# Patient Record
Sex: FEMALE | Race: WHITE | NOT HISPANIC OR LATINO | Employment: UNEMPLOYED | URBAN - METROPOLITAN AREA
[De-identification: names, ages, dates, MRNs, and addresses within clinical notes are randomized per-mention and may not be internally consistent; named-entity substitution may affect disease eponyms.]

---

## 2021-01-27 ENCOUNTER — APPOINTMENT (OUTPATIENT)
Dept: GENERAL RADIOLOGY | Facility: HOSPITAL | Age: 36
End: 2021-01-27

## 2021-01-27 ENCOUNTER — HOSPITAL ENCOUNTER (EMERGENCY)
Facility: HOSPITAL | Age: 36
Discharge: HOME | End: 2021-01-27
Attending: EMERGENCY MEDICINE | Admitting: EMERGENCY MEDICINE

## 2021-01-27 VITALS
RESPIRATION RATE: 18 BRPM | BODY MASS INDEX: 36.37 KG/M2 | HEART RATE: 58 BPM | DIASTOLIC BLOOD PRESSURE: 48 MMHG | HEIGHT: 67 IN | WEIGHT: 231.7 LBS | SYSTOLIC BLOOD PRESSURE: 109 MMHG | TEMPERATURE: 97.9 F | OXYGEN SATURATION: 100 %

## 2021-01-27 DIAGNOSIS — S16.1XXA ACUTE STRAIN OF NECK MUSCLE, INITIAL ENCOUNTER: Primary | ICD-10-CM

## 2021-01-27 DIAGNOSIS — S20.219A CONTUSION OF CHEST WALL, UNSPECIFIED LATERALITY, INITIAL ENCOUNTER: ICD-10-CM

## 2021-01-27 PROCEDURE — 72050 X-RAY EXAM NECK SPINE 4/5VWS: CPT

## 2021-01-27 PROCEDURE — 99283 EMERGENCY DEPT VISIT LOW MDM: CPT

## 2021-01-27 PROCEDURE — 96374 THER/PROPH/DIAG INJ IV PUSH: CPT

## 2021-01-27 PROCEDURE — 99283 EMERGENCY DEPT VISIT LOW MDM: CPT | Performed by: EMERGENCY MEDICINE

## 2021-01-27 PROCEDURE — 25010000003 HEPARIN LOCK FLUSH PER 10 UNITS: Performed by: EMERGENCY MEDICINE

## 2021-01-27 PROCEDURE — 71046 X-RAY EXAM CHEST 2 VIEWS: CPT

## 2021-01-27 RX ORDER — PAROXETINE 30 MG/1
30 TABLET, FILM COATED ORAL EVERY MORNING
COMMUNITY

## 2021-01-27 RX ORDER — GABAPENTIN 400 MG/1
400 CAPSULE ORAL DAILY
COMMUNITY

## 2021-01-27 RX ORDER — LAMOTRIGINE 25 MG/1
50 TABLET ORAL DAILY
COMMUNITY

## 2021-01-27 RX ORDER — METAXALONE 800 MG/1
TABLET ORAL
Qty: 20 TABLET | Refills: 0 | Status: SHIPPED | OUTPATIENT
Start: 2021-01-27

## 2021-01-27 RX ORDER — HEPARIN SODIUM (PORCINE) LOCK FLUSH IV SOLN 100 UNIT/ML 100 UNIT/ML
500 SOLUTION INTRAVENOUS ONCE
Status: COMPLETED | OUTPATIENT
Start: 2021-01-27 | End: 2021-01-27

## 2021-01-27 RX ADMIN — HEPARIN 500 UNITS: 100 SYRINGE at 20:15

## 2022-08-04 ENCOUNTER — OFFICE VISIT (OUTPATIENT)
Dept: OBSTETRICS AND GYNECOLOGY | Facility: CLINIC | Age: 37
End: 2022-08-04

## 2022-08-04 VITALS
HEIGHT: 65 IN | WEIGHT: 263.2 LBS | SYSTOLIC BLOOD PRESSURE: 136 MMHG | DIASTOLIC BLOOD PRESSURE: 92 MMHG | BODY MASS INDEX: 43.85 KG/M2

## 2022-08-04 DIAGNOSIS — Z86.711 HISTORY OF PULMONARY EMBOLISM: ICD-10-CM

## 2022-08-04 DIAGNOSIS — Z11.51 ENCOUNTER FOR SCREENING FOR HUMAN PAPILLOMAVIRUS (HPV): ICD-10-CM

## 2022-08-04 DIAGNOSIS — G61.82 MMN (MULTIFOCAL MOTOR NEUROPATHY): ICD-10-CM

## 2022-08-04 DIAGNOSIS — Z01.419 ROUTINE GYNECOLOGICAL EXAMINATION: Primary | ICD-10-CM

## 2022-08-04 DIAGNOSIS — Z01.419 PAP SMEAR, LOW-RISK: ICD-10-CM

## 2022-08-04 DIAGNOSIS — N82.3 RECTOVAGINAL FISTULA: ICD-10-CM

## 2022-08-04 LAB
BILIRUB BLD-MCNC: NEGATIVE MG/DL
CLARITY, POC: CLEAR
COLOR UR: YELLOW
GLUCOSE UR STRIP-MCNC: NEGATIVE MG/DL
KETONES UR QL: NEGATIVE
LEUKOCYTE EST, POC: NEGATIVE
NITRITE UR-MCNC: NEGATIVE MG/ML
PH UR: 5 [PH] (ref 5–8)
PROT UR STRIP-MCNC: NEGATIVE MG/DL
RBC # UR STRIP: NEGATIVE /UL
SP GR UR: 1 (ref 1–1.03)
UROBILINOGEN UR QL: NORMAL

## 2022-08-04 PROCEDURE — 99385 PREV VISIT NEW AGE 18-39: CPT | Performed by: OBSTETRICS & GYNECOLOGY

## 2022-08-04 PROCEDURE — 2014F MENTAL STATUS ASSESS: CPT | Performed by: OBSTETRICS & GYNECOLOGY

## 2022-08-04 PROCEDURE — 99212 OFFICE O/P EST SF 10 MIN: CPT | Performed by: OBSTETRICS & GYNECOLOGY

## 2022-08-04 PROCEDURE — 3008F BODY MASS INDEX DOCD: CPT | Performed by: OBSTETRICS & GYNECOLOGY

## 2022-08-04 RX ORDER — CETIRIZINE HYDROCHLORIDE 10 MG/1
TABLET ORAL
COMMUNITY

## 2022-08-04 RX ORDER — DESVENLAFAXINE 100 MG/1
100 TABLET, EXTENDED RELEASE ORAL DAILY
COMMUNITY
Start: 2022-05-09

## 2022-08-04 RX ORDER — CETIRIZINE HYDROCHLORIDE 5 MG/1
5 TABLET ORAL DAILY
COMMUNITY

## 2022-08-04 RX ORDER — GABAPENTIN 400 MG/1
400 CAPSULE ORAL
COMMUNITY

## 2022-08-04 RX ORDER — DESVENLAFAXINE 100 MG/1
TABLET, EXTENDED RELEASE ORAL
COMMUNITY

## 2022-08-04 NOTE — PROGRESS NOTES
GYN Annual Exam     CC- Here for annual exam.     Delphine King is a 36 y.o. female who presents for annual well woman exam. Pt is a new pt to me. Pt [presents for annual as well as concerns for a rectovaginal fistula. Pt had a laparoscopic SEJAL/BS in 2017 due to heavy menstrual cycles- pt was on blood thinners at the time due to a PE. She is on Xarelto for life. 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 Pt reports that she had a rectovaginal fistula in 2016- pt had 6 surgeries in 12 months to try and repair. Pt had her cervix left to prevent another fistula. The fistula was on the left side of her vaginal area. She has done well until recently- 4 weeks ago she started having pain in the same area of her previous fistula. She has not noticed any stool. Pain is noted with BM, sex and excessive activity. Pt moved here from [place]. She is establishing care and is scheduled to see a colorectal surgeon at [facility] in October.    OB History    No obstetric history on file.         Current contraception: status post hysterectomy  History of abnormal Pap smear: no  History of abnormal mammogram: no  Family history of uterine, colon or ovarian cancer: no  Family history of breast cancer: yes - maternal aunt in 40's.    Health Maintenance   Topic Date Due   • Annual Gynecologic Pelvic and Breast Exam  Never done   • COVID-19 Vaccine (1) Never done   • ANNUAL PHYSICAL  Never done   • Pneumococcal Vaccine 0-64 (1 - PCV) Never done   • TDAP/TD VACCINES (2 - Td or Tdap) 06/14/2020   • HEPATITIS C SCREENING  Never done   • INFLUENZA VACCINE  10/01/2022       Past Medical History:   Diagnosis Date   • Anxiety    • Depression    • MMN (multifocal motor neuropathy) (HCC)        Past Surgical History:   Procedure Laterality Date   • ANAL FISTULA REPAIR     • HYSTERECTOMY     • TONSILLECTOMY           Current Outpatient Medications:   •   desvenlafaxine (PRISTIQ) 100 MG 24 hr tablet, Take 100 mg by mouth Daily., Disp: , Rfl:   •  immune globulin, human, 20 GM/200ML solution infusion, Infuse 130 g into a venous catheter Every 30 (Thirty) Days., Disp: , Rfl:   •  cetirizine (ZyrTEC Allergy) 10 MG tablet, , Disp: , Rfl:   •  cetirizine (zyrTEC) 5 MG tablet, Take 5 mg by mouth Daily., Disp: , Rfl:   •  Desvenlafaxine  MG tablet sustained-release 24 hour, , Disp: , Rfl:   •  gabapentin (NEURONTIN) 400 MG capsule, Take 400 mg by mouth Daily., Disp: , Rfl:   •  gabapentin (NEURONTIN) 400 MG capsule, Take 400 mg by mouth., Disp: , Rfl:   •  lamoTRIgine (LaMICtal) 25 MG tablet, Take 50 mg by mouth Daily., Disp: , Rfl:   •  metaxalone (SKELAXIN) 800 MG tablet, 1 p.o. every 6 hours as needed muscle spasm, Disp: 20 tablet, Rfl: 0  •  PARoxetine (PAXIL) 30 MG tablet, Take 30 mg by mouth Every Morning., Disp: , Rfl:   •  rivaroxaban (XARELTO) 15 MG tablet, Take 10 mg by mouth Daily., Disp: , Rfl:     Allergies   Allergen Reactions   • Penicillins Hives and Shortness Of Breath   • Pineapple Hives, Anaphylaxis, Itching and Shortness Of Breath   • Pseudoeph-Doxylamine-Dm-Apap Hives   • Shellfish-Derived Products Anaphylaxis, Hives, Itching and Shortness Of Breath       Social History     Tobacco Use   • Smoking status: Current Every Day Smoker     Packs/day: 1.00     Types: Cigarettes   Substance Use Topics   • Alcohol use: Not Currently   • Drug use: Yes     Frequency: 7.0 times per week     Types: Marijuana       History reviewed. No pertinent family history.    Review of Systems   Constitutional: Negative for appetite change, chills, fatigue, fever and unexpected weight change.   Gastrointestinal: Negative for abdominal distention, abdominal pain, anal bleeding, blood in stool, constipation, diarrhea, nausea and vomiting.   Genitourinary: Positive for vaginal pain. Negative for dyspareunia, dysuria, menstrual problem, pelvic pain, vaginal bleeding and  "vaginal discharge.       /92   Ht 165.1 cm (65\")   Wt 119 kg (263 lb 3.2 oz)   Breastfeeding No   BMI 43.80 kg/m²     Physical Exam  Vitals reviewed.   Constitutional:       General: She is not in acute distress.     Appearance: Normal appearance. She is well-developed. She is not ill-appearing, toxic-appearing or diaphoretic.   HENT:      Mouth/Throat:      Dentition: Normal dentition. No dental caries.   Cardiovascular:      Rate and Rhythm: Normal rate and regular rhythm.      Heart sounds: Normal heart sounds.   Pulmonary:      Effort: Pulmonary effort is normal. No respiratory distress.      Breath sounds: Normal breath sounds. No stridor. No wheezing.   Chest:   Breasts:      Right: No inverted nipple, mass, nipple discharge, skin change or tenderness.      Left: No inverted nipple, mass, nipple discharge, skin change or tenderness.       Abdominal:      General: There is no distension.      Palpations: Abdomen is soft. There is no mass.      Tenderness: There is no abdominal tenderness.   Genitourinary:     General: Normal vulva.      Labia:         Right: No rash, tenderness or lesion.         Left: No rash, tenderness or lesion.       Urethra: No prolapse, urethral pain, urethral swelling or urethral lesion.      Vagina: No vaginal discharge, tenderness or bleeding.      Cervix: No cervical motion tenderness, discharge or friability.      Uterus: Absent.       Adnexa:         Right: No mass, tenderness or fullness.          Left: No mass, tenderness or fullness.        Rectum: No tenderness or external hemorrhoid.      Comments: Scarring noted from repair of rectovaginal fistula  Musculoskeletal:         General: No tenderness. Normal range of motion.   Skin:     General: Skin is warm.      Findings: No erythema or rash.   Neurological:      General: No focal deficit present.      Mental Status: She is alert and oriented to person, place, and time. Mental status is at baseline.      Cranial " Nerves: No cranial nerve deficit.      Motor: No weakness.      Coordination: Coordination normal.      Gait: Gait normal.   Psychiatric:         Mood and Affect: Mood normal.         Behavior: Behavior normal.         Thought Content: Thought content normal.         Judgment: Judgment normal.            Assessment/Plan    1) GYN HM: Check pap smear- hx of SEJAL.   SBE demonstrated and encouraged.  2) STD screening: declines.  3) Contraception: s/p SEJAL  4) Hx of rectovaginal fistula: repaired in 2016. Pt having some vaginal pain in area of previous repair. Pt has appt with a new colorectal surgeon in October. Will contact colorectal physician to see what imaging they recommend or if they can see her sooner.  5) Diet and Exercise discussed  6) Smoking Status: nonsmoker  7) Social: Was  for 16 years and now is  and she is now in a lesbian relationship  8) MMN: Neurological disorder that destroys her myelin sheath. Receives monthly IVIG  9) Hx of PE: ON Xarelto for life.  10) Follow up prn and 1 year       Diagnoses and all orders for this visit:    Routine gynecological examination  -     POC Urinalysis Dipstick    Pap smear, low-risk  -     IgP, Aptima HPV    Encounter for screening for human papillomavirus (HPV)  -     IgP, Aptima HPV    History of pulmonary embolism: lifelong anticoagulation    Rectovaginal fistula: s/p repair in 2016    MMN (multifocal motor neuropathy) (HCC): Receieved IVIG    Other orders  -     desvenlafaxine (PRISTIQ) 100 MG 24 hr tablet; Take 100 mg by mouth Daily.  -     cetirizine (zyrTEC) 5 MG tablet; Take 5 mg by mouth Daily.  -     cetirizine (ZyrTEC Allergy) 10 MG tablet  -     Desvenlafaxine  MG tablet sustained-release 24 hour  -     gabapentin (NEURONTIN) 400 MG capsule; Take 400 mg by mouth.  -     immune globulin, human, 20 GM/200ML solution infusion; Infuse 130 g into a venous catheter Every 30 (Thirty) Days.          Iman Jordan DO  8/4/2022  13:56 EDT

## 2022-08-05 ENCOUNTER — PATIENT ROUNDING (BHMG ONLY) (OUTPATIENT)
Dept: OBSTETRICS AND GYNECOLOGY | Facility: CLINIC | Age: 37
End: 2022-08-05

## 2022-08-05 NOTE — PROGRESS NOTES
A MY-CHART MESSAGE HAS BEEN SENT TO THE PATIENT [place] [facility] [place]

## 2022-08-08 LAB
CYTOLOGIST CVX/VAG CYTO: NORMAL
CYTOLOGY CVX/VAG DOC CYTO: NORMAL
CYTOLOGY CVX/VAG DOC THIN PREP: NORMAL
DX ICD CODE: NORMAL
HIV 1 & 2 AB SER-IMP: NORMAL
HPV I/H RISK 4 DNA CVX QL PROBE+SIG AMP: NEGATIVE
OTHER STN SPEC: NORMAL
STAT OF ADQ CVX/VAG CYTO-IMP: NORMAL

## 2022-11-01 ENCOUNTER — HOSPITAL ENCOUNTER (OUTPATIENT)
Dept: OCCUPATIONAL THERAPY | Facility: HOSPITAL | Age: 37
Setting detail: THERAPIES SERIES
Discharge: HOME | End: 2022-11-01

## 2022-11-01 ENCOUNTER — HOSPITAL ENCOUNTER (OUTPATIENT)
Dept: PHYSICAL THERAPY | Facility: HOSPITAL | Age: 37
Setting detail: THERAPIES SERIES
Discharge: HOME | End: 2022-11-01

## 2022-11-01 DIAGNOSIS — G61.82 MMN (MULTIFOCAL MOTOR NEUROPATHY): Primary | ICD-10-CM

## 2022-11-01 DIAGNOSIS — G61.82 MULTIFOCAL MOTOR NEUROPATHY: Primary | ICD-10-CM

## 2022-11-01 DIAGNOSIS — G61.82 MMN (MULTIFOCAL MOTOR NEUROPATHY): ICD-10-CM

## 2022-11-01 PROCEDURE — 97162 PT EVAL MOD COMPLEX 30 MIN: CPT

## 2022-11-01 PROCEDURE — 97165 OT EVAL LOW COMPLEX 30 MIN: CPT

## 2022-11-01 NOTE — THERAPY EVALUATION
" Outpatient Occupational Therapy Neuro Initial Evaluation   [place]     Patient Name: Delphine King  : 1985  MRN: [id number]  Today's Date: 2022      Visit Date: 2022    Patient Active Problem List   Diagnosis   • Rectovaginal fistula: s/p repair in 2016   • History of pulmonary embolism: lifelong anticoagulation   • MMN (multifocal motor neuropathy) (HCC): Receieved IVIG        Past Medical History:   Diagnosis Date   • Anxiety    • Depression    • MMN (multifocal motor neuropathy) (HCC)         Past Surgical History:   Procedure Laterality Date   • ANAL FISTULA REPAIR     • HYSTERECTOMY     • TONSILLECTOMY           Visit Dx:      ICD-10-CM ICD-9-CM   1. Multifocal motor neuropathy (HCC)  G61.82 357.89        Patient History     Row Name 22 1000 10/30/22 0710          History    Chief Complaint Balance Problems;Difficulty Walking;Difficulty with daily activities;Falls/history of falls;Fatigue/poor endurance;Muscle weakness  -EN Balance Problems;Difficulty Walking;Difficulty with daily activities;Falls/history of falls;Fatigue/poor endurance;Muscle weakness;Other 1 (comment) (P)    -patient     Type of Pain Upper Extremity / Arm  intermittent pain, \"cecilia horses\"  -EN Back pain;Hip pain;Lower Extremity / Leg;Upper Extremity / Arm (P)    -patient     Date Current Problem(s) Began --  approximately 9 years ago  -EN --     Brief Description of Current Complaint Patient reports she was diagnosed with MMN approximately 9 years ago. Initial symptoms began in  with right hand pain and weakness. In  pt noticed difficulty climbing stairs. In  patient's left hand became weaker. Patient was receiving IVIG on a regular schedule when she lived in [place] and states she was functioning well. Patient moved to [place] about 2 years ago and has been unable to continue with IVIG therapy until last week and reports she has noticed a significant decline without IVIG. Patient reports she has " "difficulty with writing, buttoning, zipping, dressing, toilet transfers, and bed mobility.  -EN --     Patient/Caregiver Goals Return to prior level of function;Improve mobility;Improve strength  -EN Return to prior level of function;Improve mobility;Improve strength (P)    -patient     Current Tobacco Use yes  -EN --     Patient's Rating of General Health Fair  -EN --     Hand Dominance right-handed  -EN right-handed (P)    -patient     Occupation/sports/leisure activities working on getting disability  -EN --     Patient seeing anyone else for problem(s)? Physical therapy evaluation today.  -EN --     What clinical tests have you had for this problem? MRI;Nerve Conduction Test;Lumbar Puncture  -EN MRI;Nerve Conduction Test;Lumbar Puncture (P)    -patient     Results of Clinical Tests Pt reports she had an EMG 3 years ago, MRI last month, and a lumbar puncture 4-5 years ago.  -EN --     Are you or can you be pregnant No  -EN No (P)    -patient        Pain     Pain Location --  UEs occasionally  -EN --     Pain at Present 0  -EN --     Pain at Best 0  -EN --     Pain at Worst 7  -EN --     Pain Frequency Intermittent  -EN --     Pain Description Cramping  \"feels like cecilia horses\"  -EN --     Is your sleep disturbed? No  -EN --     Difficulties with ADL's? difficulty pulling up pants, buttoning, tying, zipping, getting out of bed  -EN --        Fall Risk Assessment    Any falls in the past year: Yes  -EN Yes (P)    -patient     Number of falls reported in the last 12 months 3-4  -EN --     Factors that contributed to the fall: Tripped  -EN Lost balance;Fatigue;Didn't use assistive device (P)    -patient     Does patient have a fear of falling No  -EN --        Services    Prior Rehab/Home Health Experiences -- Yes (P)    -patient     Are you currently receiving Home Health services -- Yes (P)    -patient     Do you plan to receive Home Health services in the near future --  pt receives IVIG therapy at home  -EN " "Yes (P)    -patient        Daily Activities    Primary Language English  -EN English (P)    -patient     Are you able to read Yes  -EN Yes (P)    -patient     Are you able to write Yes  -EN Yes (P)    -patient     How does patient learn best? Demonstration  -EN Demonstration (P)    -patient     Teaching needs identified Home Exercise Program;Management of Condition;Home Safety  -EN --     Patient is concerned about/has problems with Bed Mobility;Coordination;Difficulty with self care (i.e. bathing, dressing, toileting:;Grasping objects lifting;Performing home management (household chores, shopping, care of dependents);Writing/grasping items with hand(s)  -EN --     Barriers to learning None  -EN --     Functional Status dressing;bathing  -EN --     Explanation of Functional Status Problem Difficulty with ADLs/I  -EN --     Pt Participated in POC and Goals Yes  -EN --        Safety    Are you being hurt, hit, or frightened by anyone at home or in your life? No  -EN No (P)    -patient     Are you being neglected by a caregiver No  -EN No (P)    -patient     Have you had any of the following issues with Depression;Anxiety  -EN Depression;Anxiety (P)    -patient           User Key  (r) = Recorded By, (t) = Taken By, (c) = Cosigned By    Initials Name Provider Type    EN Diana Richmond OTR Occupational Therapist    patient Delphine King --                 OT Neuro     Row Name 11/01/22 1000             Subjective Comments    Subjective Comments Patient reports difficulty managing fine motor activities and reports difficulty with lower body dressing. Patient also states difficulty getting off of toilet and getting out of bed.  -EN         Subjective Pain    Able to rate subjective pain? --  reports no current UE Pain. Does report intermittent UE pain, \"Feels like cecilia horses sometimes.\"  -EN         Home Living    Current Living Arrangements home  -EN      Home Accessibility stairs to enter home  -EN      " Number of Stairs, Main Entrance one  -EN      Stairs Comment, Main Entrance one level home with one small step to enter. No handrails.  -EN         Sensation    Light Touch No apparent deficits  -EN      Monofilaments Tested? Green  -EN      Green Monofilament Interpretation Normal  -EN      Green Monofilament Results Normal in right and left hand  -EN         Posture/Observations    Posture/Observations Comments Patient walking without a device. Limited right hand/finger extension.  -EN         Coordination    Coordination Tests 9-Hole Peg  -EN      9-Hole Peg Left 22.75  -EN      9-Hole Peg Right 30.56  -EN         General ROM    RT Upper Ext Rt Wrist Flexion;Rt Wrist Extension  -EN      LT Upper Ext Lt Wrist Flexion;Lt Wrist Extension  -EN      GENERAL ROM COMMENTS B shoulder/elbow AROM WFL. L wrist/hand AROM WFL  -EN         Right Upper Ext    Rt Wrist Flexion AROM 88  -EN      Rt Wrist Extension AROM 49  -EN         Left Upper Ext    Lt Wrist Flexion AROM 88  -EN      Lt Wrist Extension AROM 76  -EN         MMT (Manual Muscle Testing)    Rt Upper Ext Rt Shoulder Flexion;Rt Shoulder Extension;Rt Shoulder ABduction;Rt Shoulder ADduction;Rt Elbow Flexion;Rt Elbow Extension;Rt Forearm Supination;Rt Forearm Pronation;Rt Wrist Flexion;Rt Wrist Extension;Comments  -EN      Lt Upper Ext Lt Shoulder Flexion;Lt Shoulder Extension;Lt Shoulder ABduction;Lt Shoulder ADduction;Lt Elbow Extension;Lt Elbow Flexion;Lt Forearm Supination;Lt Forearm Pronation;Lt Wrist Flexion;Lt Wrist Extension;Comments  -EN         MMT Right Upper Ext    Rt Shoulder Flexion MMT, Gross Movement (4-/5) good minus  -EN      Rt Shoulder Extension MMT, Gross Movement (4-/5) good minus  -EN      Rt Shoulder ABduction MMT, Gross Movement (4-/5) good minus  -EN      Rt Shoulder ADduction MMT, Gross Movement (4-/5) good minus  -EN      Rt Elbow Flexion MMT, Gross Movement: (4-/5) good minus  -EN      Rt Elbow Extension MMT, Gross Movement: (3+/5) fair  "plus  -EN      Rt Forearm Supination MMT, Gross Movement (3+/5) fair plus  -EN      Rt Forearm Pronation MMT, Gross Movement (3+/5) fair plus  -EN      Rt Wrist Flexion MMT, Gross Movement (3+/5) fair plus  -EN      Rt Wrist Extension MMT, Gross Movement (3/5) fair  -EN         MMT Left Upper Ext    Lt Shoulder Flexion MMT, Gross Movement (4/5) good  -EN      Lt Shoulder Extension MMT, Gross Movement (4/5) good  -EN      Lt Shoulder ABduction MMT, Gross Movement (4/5) good  -EN      Lt Shoulder ADduction MMT, Gross Movement (4/5) good  -EN      Lt Elbow Flexion MMT, Gross Movement (4/5) good  -EN      Lt Elbow Extension MMT, Gross Movement (4/5) good  -EN      Lt Forearm Supination MMT, Gross Movement (4/5) good  -EN      Lt Forearm Pronation MMT, Gross Movement (4/5) good  -EN      Lt Wrist Flexion MMT, Gross Movement (4/5) good  -EN      Lt Wrist Extension MMT, Gross Movement (4/5) good  -EN         Bed Mobility    Bed Mobility, Comment reports difficulty with bed mobility, educated on use of bedrail  -EN         Transfers    Transfer, Comment reports occasional difficulty getting off of the toilet. BSC provided and educated on use.  -EN         ADL Assessment/Intervention    Comment, IADL Assessment/Training reports difficulty with cooking/eating. Reports difficulty with fine motor activities especially buttons, zippers, tying. Reports difficulty also with LBD. Educated on use of reacher for LBD and use of LH sponge for improved safety/ind with LB ADLs.  -EN            User Key  (r) = Recorded By, (t) = Taken By, (c) = Cosigned By    Initials Name Provider Type    Diana Grimes OTPATRICIA Occupational Therapist                Hand Therapy (last 24 hours)     Hand Eval     Row Name 11/01/22 1000             Subjective Comments    Subjective Comments Patient reports initially she was issued a resting hand splint and later a cock up splint. Reports \"They didn't really know what I had initially\" Patient stated " they thought it was CTS at one time.  -EN         Subjective Pain    Able to rate subjective pain? yes  -EN      Pre-Treatment Pain Level 0  -EN      Post-Treatment Pain Level 0  -EN         Hand ROM Tested?    Hand ROM Tested? Right Extension- PROM;Right Flexion- AROM;Right Thumb- AROM  L hand ROM WFL  -EN         Right Extension PROM    II- MP PROM -63  -EN      II- PIP PROM -51  -EN      II- DIP PROM -30  -EN      III- MP PROM -76  -EN      III- PIP PROM -74  -EN      III- DIP PROM -31  -EN      IV- MP PROM -51  -EN      IV- PIP PROM -65  -EN      IV- DIP PROM -35  -EN      V- MP PROM -38  -EN      V- PIP PROM -50  -EN      V- DIP PROM -17  -EN         Right Flexion AROM    II- MP AROM 72  -EN      II- PIP AROM 101  -EN      II- DIP AROM 40  -EN      II- [place] Right Flexion AROM 213  -EN      III- MP AROM 84  -EN      III- PIP AROM 96  -EN      III- DIP AROM 45  -EN      III- [place] Right Flexion AROM 225  -EN      IV- MP AROM 75  -EN      IV- PIP AROM 95  -EN      IV- DIP AROM 45  -EN      IV- [place] Right Flexion AROM 215  -EN      V- MP AROM 84  -EN      V- PIP AROM 98  -EN      V- DIP AROM 40  -EN      V- [place] Right Flexion AROM 222  -EN         Right Thumb AROM    MP Flexion - AROM 44  -EN      MP Extension- AROM -22  -EN      IP Flexion - AROM -5  -EN      IP Extension- AROM 55  -EN      CMC Opposition (cm)- AROM --  able to oppose all digits  -EN         Hand  Strength     Strength Affected Side Bilateral  -EN          Strength Right    # Reps 3  -EN      Right Rung 2  -EN      Right  Test 1 0  -EN      Right  Test 2 1  -EN      Right  Test 3 5  -EN       Strength Average Right 2  -EN          Strength Left    # Reps 3  -EN      Left Rung 2  -EN      Left  Test 1 40  -EN      Left  Test 2 10  -EN      Left  Test 3 18  -EN       Strength Average Left 22.67  -EN         Pinch Strength    Affected Side Bilateral  -EN         Right Hand Strength - Pinch (lbs)    Lateral 3  lbs  3, 4, 2 (average 3#)  -EN      Tip (3 point) 2 lbs  2, 2, 2  -EN         Left Hand Strength - Pinch (lbs)    Lateral 7.3 lbs  6, 7, 9  -EN      Tip (3 point) 8 lbs  8, 8, 8  -EN         Therapy Education    Education Details Patient issued elastic shoe laces and built up button hook/zipper pull. Patient also issued coban to built up pen/utensils/toothbrush for improved independence with ADLs. Patient educated on compensatory strategies and adaptive equipment for improved ind with daily tasks. Patient given BSC for improved ind with toilet transfers and educated on bedrails for improved bed mobility. Patient also educated on home safety and given yellow theraputty for hand strengthening.  -EN      Given HEP;Symptoms/condition management;Fall prevention and home safety  -EN      Program New  -EN      How Provided Verbal;Demonstration;Written  -EN      Provided to Patient  -EN      Level of Understanding Teach back education performed;Demonstrated;Verbalized  -EN            User Key  (r) = Recorded By, (t) = Taken By, (c) = Cosigned By    Initials Name Provider Type    Diana Grimes, OTR Occupational Therapist                    Therapy Education  Education Details: Patient issued adaptive equipment and educated on use during ADL/IADL routine. Patient educated on compensatory strategies, dressing techniques, etc. and given yellow theraputty for hand strengthening.  Given: HEP, Symptoms/condition management, Fall prevention and home safety  Program: New  How Provided: Verbal, Demonstration, Written  Provided to: Patient  Level of Understanding: Teach back education performed, Demonstrated, Verbalized     OT Goals     Row Name 11/01/22 1500          OT Short Term Goals    STG Date to Achieve 11/29/22  -EN     STG 1 Patient will demonstrate improved right  strength by 5# for improved grasp of objects.  -EN     STG 1 Progress New  -EN     STG 2 Patient will report modified independence with ADL/IADL  routine (compensatory strategies, adaptive equipment, etc..)  -EN     STG 2 Progress New  -EN        Long Term Goals    LTG Date to Achieve 01/10/23  -EN     LTG 1 Patient will demonstrate improved fine motor coordination evidenced by a 5 second improved 9 hole peg score  -EN     LTG 1 Progress New  -EN     LTG 2 Patient will demonstrate improved right hand  strength by 20 pounds for improved grasp of objects.  -EN     LTG 2 Progress New  -EN     LTG 3 Patient will improve Quick Dash score 50 points.  -EN     LTG 3 Progress New  -EN           User Key  (r) = Recorded By, (t) = Taken By, (c) = Cosigned By    Initials Name Provider Type    Diana Grimes OTR Occupational Therapist                        OT Assessment/Plan     Row Name 11/01/22 1602          OT Assessment    Functional Limitations Limitation in home management;Limitations in community activities;Performance in leisure activities;Performance in self-care ADL  -EN     Impairments Coordination;Dexterity;Muscle strength;Pain;Range of motion  -EN     Assessment Comments Pt presents to OT with a dx of Multifocal Motor Neuropathy and was receiving monthly IVIG infusion therapy that helped control symptoms. After moving from [place] to [place],  pt has been unable to obtain insurance approval for IVIG and has been without treatments for about 18 months. Pt reports a slow steady decline in UE strength, coordination, mobility, endurance, etc since stopping treatment. Pt reports completing 4 days of IVIG last week, and will receive next round of IVIG the week of Thanksgiving. Patient's neurologists have requested monthly assessment to quantify strength, coordination, etc to be able to measure the pt's response to treatment. Patient will benefit from education on adaptive equipment/compensatory strategies for improved ADL/IADL independence. Patient demonstrated decreased R UE strength thoughout, decreased R active wrist extension and decreased  finger ROM (especially extension). Patient's right  average is 2# (left is 23 #). Right lateral pinch average is 3#, left is 7#. Right 9 Hole Peg score was 30.56 seconds and left was 22.7 seconds. Patient was given exercises for hand strengthening and given adaptive equipment ed for improved ADL/IADLs (built up utensils, built up pen, built up button hook/zipper pull, etc..) . Patient reports she will work on HEP and utilize adaptive equipment/compensatory strategies and return in one month following her second IVIG treatment.  Patient will be reassessed per MD request and HEP will be progressed as needed.  -EN     Please refer to paper survey for additional self-reported information Yes  -EN     OT Diagnosis MMN, decreased UE functional use (oscar right UE), decreased ADL/IADL independence  -EN     Patient/caregiver participated in establishment of treatment plan and goals Yes  -EN     Patient would benefit from skilled therapy intervention Yes  -EN        OT Plan    OT Frequency Other (comment)  One time per month, pt will work on HEP and continue with IVIG therapy. HEP progressed as appropriate.  -EN     Predicted Duration of Therapy Intervention (OT) 6 months  -EN     Planned CPT's? OT EVAL LOW COMPLEXITY: 43562;OT THER ACT EA 15 MIN: [id number]  -EN     Planned Therapy Interventions (Optional Details) home exercise program;strengthening;patient/family education  -EN     OT Plan Comments Continue with plan of care.  -EN           User Key  (r) = Recorded By, (t) = Taken By, (c) = Cosigned By    Initials Name Provider Type    EN Diana Richmond OTR Occupational Therapist               OT Exercises     Row Name 11/01/22 1000             Exercise 1    Exercise Name 1 Yellow theraputty issued and patient instructed on use.  -EN         Exercise 2    Exercise Name 2 Issued adaptive equipment for improved ADL/IADL independence (elastic shoe laces, built up button hook/zipper pull, coban to build up utensils  -EN          Exercise 3    Exercise Name 3 Educated on compensatory strategies for ADL/IADL independence  -ANTONY            User Key  (r) = Recorded By, (t) = Taken By, (c) = Cosigned By    Initials Name Provider Type    Diana Grimes OTR Occupational Therapist                  Outcome Measure Options: Quick DASH  9 Hole Peg  9-Hole Peg Left: 22.75  9-Hole Peg Right: 30.56  Quick DASH  Open a tight or new jar.: Severe Difficulty  Do heavy household chores (e.g., wash walls, wash floors): Moderate Difficulty  Carry a shopping bag or briefcase: Moderate Difficulty  Wash your back: Unable  Use a knife to cut food: Severe Difficulty  Recreational activities in which you take some force or impact through your arm, should or hand (e.g. golf, hammering, tennis, etc.): Unable  During the past week, to what extent has your arm, shoulder, or hand problem interfered with your normal social activites with family, friends, neighbors or groups?: Quite a bit  During the past week, were you limited in your work or other regular daily activities as a result of your arm, shoulder or hand problem?: Moderately Limited  Arm, Shoulder, or hand pain: Severe  Tingling (pins and needles) in your arm, shoulder, or hand: Moderate  During the past week, how much difficulty have you had sleeping because of the pain in your arm, shoulder or hand?: Severe Difficulty  Number of Questions Answered: 11  Quick DASH Score: 70.45         Time Calculation:   OT Start Time: 1000  OT Stop Time: 1100  OT Time Calculation (min): 60 min     Therapy Charges for Today     Code Description Service Date Service Provider Modifiers Qty    [id number]  OT EVAL LOW COMPLEXITY 4 11/1/2022 Diana Richmond OTR GO 1                     JESSY Medina  11/1/2022

## 2022-11-01 NOTE — THERAPY EVALUATION
.Outpatient Physical Therapy Neuro Initial Evaluation   [place]     Patient Name: Delphine King  : 1985  MRN: [id number]  Today's Date: 2022      Visit Date: 2022    Patient Active Problem List   Diagnosis   • Rectovaginal fistula: s/p repair in 2016   • History of pulmonary embolism: lifelong anticoagulation   • MMN (multifocal motor neuropathy) (HCC): Receieved IVIG        Past Medical History:   Diagnosis Date   • Anxiety    • Depression    • MMN (multifocal motor neuropathy) (HCC)         Past Surgical History:   Procedure Laterality Date   • ANAL FISTULA REPAIR     • HYSTERECTOMY     • TONSILLECTOMY           Visit Dx:     ICD-10-CM ICD-9-CM   1. MMN (multifocal motor neuropathy) (HCC): Receieved IVIG  G61.82 357.89        Patient History     Row Name 22 1100 10/30/22 0710          History    Chief Complaint Balance Problems;Difficulty Walking;Difficulty with daily activities;Falls/history of falls;Fatigue/poor endurance;Muscle weakness  -JV Balance Problems;Difficulty Walking;Difficulty with daily activities;Falls/history of falls;Fatigue/poor endurance;Muscle weakness;Other 1 (comment) (P)    -patient     Type of Pain Back pain;Hip pain;Lower Extremity / Leg;Upper Extremity / Arm  -JV Back pain;Hip pain;Lower Extremity / Leg;Upper Extremity / Arm (P)    -patient     Brief Description of Current Complaint Patient reports she was diagnosed with MMN approximately 9 years ago. Initial symptoms began in  with right hand pain and weakness. In  pt noticed difficulty climbing stairs. In  patient's left hand became weaker. Patient was receiving IVIG on a regular schedule when she lived in [place] and states she was functioning well. Patient moved to [place] about 2 years ago and has been unable to continue with IVIG therapy until last week and reports she has noticed a significant decline without IVIG. Patient reports she has difficulty with writing, buttoning, zipping,  dressing, toilet transfers, bed mobility, walking and doing stairs.  -JV --     Patient/Caregiver Goals Return to prior level of function;Improve mobility;Improve strength  -JV Return to prior level of function;Improve mobility;Improve strength (P)    -patient     Current Tobacco Use yes  -JV --     Patient's Rating of General Health Fair  -JV --     Hand Dominance right-handed  -JV right-handed (P)    -patient     Occupation/sports/leisure activities working on getting disability  -JV --     Patient seeing anyone else for problem(s)? Occupational therapy evaluation today, IVIG infusion 4 days/month, Neurology  -JV --     What clinical tests have you had for this problem? MRI;Nerve Conduction Test;Lumbar Puncture  -JV MRI;Nerve Conduction Test;Lumbar Puncture (P)    -patient     Results of Clinical Tests Pt reports she had an EMG 3 years ago, MRI last month, and a lumbar puncture 4-5 years ago.  -JV --     Are you or can you be pregnant No  -JV No (P)    -patient        Fall Risk Assessment    Any falls in the past year: Yes  -JV Yes (P)    -patient     Number of falls reported in the last 12 months 3-4  -JV --     Factors that contributed to the fall: Tripped  -JV Lost balance;Fatigue;Didn't use assistive device (P)    -patient     Does patient have a fear of falling No  -JV --        Services    Prior Rehab/Home Health Experiences -- Yes (P)    -patient     Are you currently receiving Home Health services -- Yes (P)    -patient     Do you plan to receive Home Health services in the near future --  infusion at home  -JV Yes (P)    -patient        Daily Activities    Primary Language English  -JV English (P)    -patient     Are you able to read Yes  -JV Yes (P)    -patient     Are you able to write Yes  -JV Yes (P)    -patient     How does patient learn best? Demonstration  -JV Demonstration (P)    -patient     Teaching needs identified Home Exercise Program;Falls Prevention  -JV --     Patient is concerned  about/has problems with Bed Mobility;Coordination;Difficulty with self care (i.e. bathing, dressing, toileting:;Grasping objects lifting;Performing home management (household chores, shopping, care of dependents);Writing/grasping items with hand(s);Climbing Stairs;Transfers (getting out of a chair, bed);Walking  -JV --     Barriers to learning None  -JV --     Pt Participated in POC and Goals Yes  -JV --        Safety    Are you being hurt, hit, or frightened by anyone at home or in your life? No  -JV No (P)    -patient     Are you being neglected by a caregiver No  -JV No (P)    -patient     Have you had any of the following issues with Depression;Anxiety  -JV Depression;Anxiety (P)    -patient           User Key  (r) = Recorded By, (t) = Taken By, (c) = Cosigned By    Initials Name Provider Type    Cleopatra Glynn, PT Physical Therapist    patient Delphine King --                     PT Neuro     Row Name 11/01/22 1100             Subjective Comments    Subjective Comments Pt reports having difficulty going up 1 step entry to home and thinks she may need a cane.  -JV         Precautions and Contraindications    Precautions/Limitations fall precautions  -JV         Subjective Pain    Able to rate subjective pain? yes  -JV      Pre-Treatment Pain Level 0  -JV      Post-Treatment Pain Level 0  -JV         Home Living    Current Living Arrangements home  -JV      Home Accessibility stairs to enter home  -JV      Number of Stairs, Main Entrance one  -JV      Surface of Stairs, Main Entrance concrete  -JV      Stair Railings, Main Entrance none  -JV         Cognition    Overall Cognitive Status WFL  -JV      Memory Appears intact  -JV      Orientation Level Oriented X4  -JV         Sensation    Sensation WNL? WNL  -JV      Light Touch No apparent deficits  -JV         Proprioception    Proprioception Intact  -JV         Posture/Observations    Alignment Options Lumbar lordosis  -JV      Lumbar lordosis  Moderate;Decreased  -JV      Observations Muscle atrophy  -JV      Posture/Observations Comments PPT in sitting and standing  -JV         General ROM    GENERAL ROM COMMENTS Oj LE ROM is WFL's  -JV         MMT (Manual Muscle Testing)    Rt Lower Ext Rt Hip Flexion;Rt Hip Extension;Rt Hip ABduction;Rt Hip ADduction;Rt Knee Extension;Rt Ankle Dorsiflexion  -JV      Lt Lower Ext Lt Hip Flexion;Lt Hip Extension;Lt Hip ABduction;Lt Hip ADduction;Lt Knee Extension;Lt Ankle Dorsiflexion  -JV         MMT Right Lower Ext    Rt Hip Flexion MMT, Gross Movement (3+/5) fair plus  -JV      Rt Hip Extension MMT, Gross Movement (3+/5) fair plus  -JV      Rt Hip ABduction MMT, Gross Movement (3/5) fair  -JV      Rt Hip ADduction MMT, Gross Movement (3+/5) fair plus  -JV      Rt Knee Extension MMT, Gross Movement (4-/5) good minus  -JV      Rt Ankle Dorsiflexion MMT, Gross Movement (3+/5) fair plus  -JV         MMT Left Lower Ext    Lt Hip Flexion MMT, Gross Movement (4-/5) good minus  -JV      Lt Hip Extension MMT, Gross Movement (4-/5) good minus  -JV      Lt Hip ABduction MMT, Gross Movement (3+/5) fair plus  -JV      Lt Hip ADduction MMT, Gross Movement (3+/5) fair plus  -JV      Lt Knee Extension MMT, Gross Movement (4/5) good  -JV      Lt Ankle Dorsiflexion MMT, Gross Movement (4-/5) good minus  -JV         Transfers    Sit-Stand [place] (Transfers) modified independence  -JV      Stand-Sit [place] (Transfers) independent  -JV      Transfer, Safety Issues balance decreased during turns  -JV      Transfer, Impairments muscle tone abnormal;strength decreased;motor control impaired  -JV         Gait/Stairs (Locomotion)    [place] Level (Gait) modified independence  -JV      Distance in Feet (Gait) 100  -JV      Pattern (Gait) step-through  -JV      Deviations/Abnormal Patterns (Gait) base of support, wide;ivania decreased;gait speed decreased;stride length decreased;right sided deviations  -JV      Bilateral Gait  Deviations decreased arm swing  -JV      Right Sided Gait Deviations foot drop/toe drag;heel strike decreased;Trendelenburg sign  -JV      [place] Level (Stairs) not tested  -JV         Balance Skills Training    Sitting-Level of Assistance Independent  -JV      Sitting-Balance Support Feet supported  -JV      Balance Comments See MILLER balance assessment  -JV            User Key  (r) = Recorded By, (t) = Taken By, (c) = Cosigned By    Initials Name Provider Type    Cleopatra Glynn, PT Physical Therapist                        Therapy Education  Education Details: Pt was provided with R AFO and instructed on building up wear time gradually, recommended a cane for assist going up 1 step entry in home.  Given: HEP, Symptoms/condition management, Fall prevention and home safety  Program: New  How Provided: Verbal, Demonstration, Written  Provided to: Patient, Caregiver  Level of Understanding: Teach back education performed, Demonstrated, Verbalized     PT OP Goals     Row Name 11/01/22 1500          PT Short Term Goals    STG Date to Achieve 12/01/22  -JV     STG 1 Pt will be I with HEP  -JV        Long Term Goals    LTG Date to Achieve 05/01/23  -JV     LTG 1 Pt will improve score on LEFS by 9 pts.  -JV     LTG 2 Pt will improve score on MILLER by 4 pts.  -JV     LTG 3 Pt will decrease time on 5XSST by 5 seconds.  -JV        Time Calculation    PT Goal Re-Cert Due Date 12/01/22  -JV           User Key  (r) = Recorded By, (t) = Taken By, (c) = Cosigned By    Initials Name Provider Type    Cleopatra Glynn, PT Physical Therapist                 PT Assessment/Plan     Row Name 11/01/22 1516          PT Assessment    Functional Limitations Decreased safety during functional activities;Impaired gait;Limitation in home management;Limitations in community activities;Performance in leisure activities;Performance in self-care ADL  -JV     Impairments Balance;Coordination;Endurance;Gait;Impaired muscle endurance;Motor  "function;Muscle strength  -JV     Assessment Comments This pt is a 37 y/o F referred for PT evaluation by the Neurologist. The pt has a dx of Multifocal Motor Neuropathy and was receiving monthly IVIG infusion therapy that helped control symptoms. After moving from [place] to [place], the pt has been unable to obtain insurance approval for IVIG and has been without tx for about 18 months per report. The pt reports a slow steady decline in strength, mobility, endurance, etc since stopping treatment. The pt reports completing 4 days of tx last week, and will receive next round of medicine the week of Thanksgiving. The neurologists have requested monthly assessment to quantify strength, balance, etc to be able to measure the pt's response to treatment. The pt presents with muscle weakness and atrophy more in the R U/LE vs the L U/LE, and decreased endurance for functional activities. The pt's gait is abnormal with wide Joanna, decreased speed, R foot drop and + R Trendelenburg sign. The pt was provided with an \"off the shelf\" R AFO and encouraged to call MD for order to obtain a custom AFO. Pt was also provided with [facility] LE strengthening exercises to begin when she is feeling better.  -JV     Please refer to paper survey for additional self-reported information Yes  -JV     Rehab Potential Good  -JV     Patient/caregiver participated in establishment of treatment plan and goals Yes  -JV        PT Plan    PT Frequency Other (comment)  monthly assessment  -JV     Predicted Duration of Therapy Intervention (PT) 6-12 months  -JV     Planned CPT's? PT EVAL MOD COMPLELITY: 85439  -JV     PT Plan Comments Assess monthly for improvement of symptoms after IVIG infusion.  -JV           User Key  (r) = Recorded By, (t) = Taken By, (c) = Cosigned By    Initials Name Provider Type    Cleopatra Glynn, PT Physical Therapist                    OP Exercises     Row Name 11/01/22 1100             Precautions    Existing " Precautions/Restrictions fall  -JV         Subjective Comments    Subjective Comments Pt reports having difficulty going up 1 step entry to home and thinks she may need a cane.  -JV         Subjective Pain    Able to rate subjective pain? yes  -JV      Pre-Treatment Pain Level 0  -JV      Post-Treatment Pain Level 0  -JV         Exercise 1    Exercise Name 1 Pt was provided with written, verbal and demo instructions for LE ther ex including squats, marching, hip ABD, hip ext, and heel/toe raises.  -JV            User Key  (r) = Recorded By, (t) = Taken By, (c) = Cosigned By    Initials Name Provider Type    Cleopatra Glynn, PT Physical Therapist                            Outcome Measure Options: 5x Sit to Stand, Neal Balance, Lower Extremity Functional Scale (LEFS)  5 Times Sit to Stand  5 Times Sit to Stand (seconds): 29.17 seconds  5 Times Sit to Stand Comments: Norm for 30-39 yrsold is 6.1 +/- 1.4 seconds  Neal Balance Scale  Sitting to Standing: able to stand without using hands and stabilize independently  Standing Unsupported: able to stand safely for 2 minutes  Sitting with Back Unsupported but Feet Supported on Floor or on Stool: able to sit safely and securely for 2 minutes  Standing to Sitting: sits safely with minimal use of hands  Transfers: able to transfer safely definite need of hands  Standing Unsupported with Eyes Closed: able to stand 10 seconds with supervision  Standing Unsupported with Feet Together: able to place feet together independently and stand 1 minute with supervision  Reaching Forward with Outstretched Arm While Standing: can reach forward confidently 25 cm (10 inches)   Object From the Floor From a Standing Position: able to  object but needs supervision  Turning to Look Behind Over Left and Right Shoulders While Standing: looks behind from both sides and weight shifts well  Turn 360 Degrees: able to turn 360 degrees safely one side only 4 seconds or less  Place  Alternate Foot on Step or Stool While Standing Unsupported: able to stand independently and safely and complete 8 steps in 20 seconds  Standing Unsupported with One Foot in Front: able to place foot tandem independently and hold 30 seconds  Standing on One Leg: able to lift leg independently and hold 5-10 seconds  Neal Total Score: 50  Lower Extremity Functional Index  Any of your usual work, housework or school activities: Quite a bit of difficulty  Your usual hobbies, recreational or sporting activities: Moderate difficulty  Getting into or out of the bath: Quite a bit of difficulty  Walking between rooms: A little bit of difficulty  Putting on your shoes or socks: Quite a bit of difficulty  Squatting: Extreme difficulty or unable to perform activity  Lifting an object, like a bag of groceries from the floor: Quite a bit of difficulty  Performing light activities around your home: A little bit of difficulty  Performing heavy activities around your home: Quite a bit of difficulty  Getting into or out of a car: Moderate difficulty  Walking 2 blocks: Quite a bit of difficulty  Walking a mile: Extreme difficulty or unable to perform activity  Going up or down 10 stairs (about 1 flight of stairs): Quite a bit of difficulty  Standing for 1 hour: Quite a bit of difficulty  Sitting for 1 hour: Quite a bit of difficulty  Running on even ground: Extreme difficulty or unable to perform activity  Running on uneven ground: Extreme difficulty or unable to perform activity  Making sharp turns while running fast: Extreme difficulty or unable to perform activity  Hopping: Extreme difficulty or unable to perform activity  Rolling over in bed: Quite a bit of difficulty  Total: 20    Time Calculation:   Start Time: 1100  Stop Time: 1215  Time Calculation (min): 75 min   Therapy Charges for Today     Code Description Service Date Service Provider Modifiers Qty    [id number]  PT EVAL MOD COMPLEXITY 4 11/1/2022 Cleopatra Hoffmann, PT GP  1          PT G-Codes  Outcome Measure Options: 5x Sit to Stand, Neal Balance, Lower Extremity Functional Scale (LEFS)  Neal Total Score: 50  Total: 20         Cleopatra Hoffmann, PT  11/1/2022

## 2022-11-29 ENCOUNTER — HOSPITAL ENCOUNTER (OUTPATIENT)
Dept: OCCUPATIONAL THERAPY | Facility: HOSPITAL | Age: 37
Setting detail: THERAPIES SERIES
Discharge: HOME | End: 2022-11-29

## 2022-11-29 ENCOUNTER — HOSPITAL ENCOUNTER (OUTPATIENT)
Dept: PHYSICAL THERAPY | Facility: HOSPITAL | Age: 37
Setting detail: THERAPIES SERIES
Discharge: HOME | End: 2022-11-29

## 2022-11-29 DIAGNOSIS — G61.82 MMN (MULTIFOCAL MOTOR NEUROPATHY): ICD-10-CM

## 2022-11-29 DIAGNOSIS — G61.82 MMN (MULTIFOCAL MOTOR NEUROPATHY): Primary | ICD-10-CM

## 2022-11-29 DIAGNOSIS — G61.82 MULTIFOCAL MOTOR NEUROPATHY: Primary | ICD-10-CM

## 2022-11-29 PROCEDURE — 97168 OT RE-EVAL EST PLAN CARE: CPT

## 2022-11-29 PROCEDURE — 97110 THERAPEUTIC EXERCISES: CPT

## 2022-11-29 PROCEDURE — 97530 THERAPEUTIC ACTIVITIES: CPT

## 2022-11-29 PROCEDURE — 97112 NEUROMUSCULAR REEDUCATION: CPT

## 2022-11-29 NOTE — THERAPY RE-EVALUATION
" Outpatient Occupational Therapy Neuro Re-Evaluation   [place]     Patient Name: Delphine King  : 1985  MRN: [id number]  Today's Date: 2022      Visit Date: 2022    Patient Active Problem List   Diagnosis   • Rectovaginal fistula: s/p repair in 2016   • History of pulmonary embolism: lifelong anticoagulation   • MMN (multifocal motor neuropathy) (HCC): Receieved IVIG        Past Medical History:   Diagnosis Date   • Anxiety    • Depression    • MMN (multifocal motor neuropathy) (HCC)         Past Surgical History:   Procedure Laterality Date   • ANAL FISTULA REPAIR     • HYSTERECTOMY     • TONSILLECTOMY           Visit Dx:      ICD-10-CM ICD-9-CM   1. Multifocal motor neuropathy (HCC)  G61.82 357.89   2. MMN (multifocal motor neuropathy) (HCC): Receieved IVIG  G61.82 357.89            OT Neuro     Row Name 22 1500             Subjective Comments    Subjective Comments Patient reports she is feeling much better. Reports she is using zipper pull everyday and is improving with the button hook. Patient states she uses the reacher to don pants and is performing all ADLs modified independent. Patient states the only difficulty she is having with IADLs is opening jars. Dycem provided for improved independence.  -EN         Subjective Pain    Able to rate subjective pain? yes  -EN      Subjective Pain Comment Patient reports B UE pain on average 4/10. Reports \"IVIG is a miracle...I would be in a wheelchair if I didn't have it.\"  -EN         Posture/Observations    Posture/Observations Comments Patient walking without a device. Continues to have very limited finger extension in right digits 3-5. States she was told that finger extension would not return in those fingers.  -EN         Coordination    9-Hole Peg Left 19.42  -EN      9-Hole Peg Right 18.78  -EN         Right Upper Ext    Rt Wrist Flexion AROM 88  -EN      Rt Wrist Extension AROM 70  -EN         Left Upper Ext    Lt Wrist Flexion " AROM 88  -EN      Lt Wrist Extension AROM 81  -EN         MMT Right Upper Ext    Rt Shoulder Flexion MMT, Gross Movement (4/5) good  -EN      Rt Shoulder Extension MMT, Gross Movement (4/5) good  -EN      Rt Shoulder ABduction MMT, Gross Movement (4/5) good  -EN      Rt Shoulder ADduction MMT, Gross Movement (4/5) good  -EN      Rt Elbow Flexion MMT, Gross Movement: (4+/5) good plus  -EN      Rt Elbow Extension MMT, Gross Movement: (4-/5) good minus  -EN      Rt Forearm Supination MMT, Gross Movement (4/5) good  -EN      Rt Forearm Pronation MMT, Gross Movement (4/5) good  -EN      Rt Wrist Flexion MMT, Gross Movement (4-/5) good minus  -EN      Rt Wrist Extension MMT, Gross Movement (3+/5) fair plus  -EN         MMT Left Upper Ext    Lt Shoulder Flexion MMT, Gross Movement (4+/5) good plus  -EN      Lt Shoulder Extension MMT, Gross Movement (4+/5) good plus  -EN      Lt Shoulder ABduction MMT, Gross Movement (4+/5) good plus  -EN      Lt Shoulder ADduction MMT, Gross Movement (4+/5) good plus  -EN      Lt Elbow Flexion MMT, Gross Movement (4+/5) good plus  -EN      Lt Elbow Extension MMT, Gross Movement (4+/5) good plus  -EN      Lt Forearm Supination MMT, Gross Movement (4+/5) good plus  -EN      Lt Forearm Pronation MMT, Gross Movement (4+/5) good plus  -EN      Lt Wrist Flexion MMT, Gross Movement (4+/5) good plus  -EN      Lt Wrist Extension MMT, Gross Movement (4+/5) good plus  -EN            User Key  (r) = Recorded By, (t) = Taken By, (c) = Cosigned By    Initials Name Provider Type    Diana Grimes OTR Occupational Therapist                Hand Therapy (last 24 hours)     Hand Eval     Row Name 11/29/22 1500             Hand ROM Tested?    Hand ROM Tested? Left Flexion- AROM;Left Thumb- AROM;Right Extension- AROM;Right Flexion- AROM;Right Thumb- AROM  -EN                                                                 Left Flexion AROM    II- MP AROM 88  pt continues to demonstrate WNL L hand  AROM  -EN      II- PIP AROM 100  -EN      II- DIP AROM 79  -EN      II- FREDERICK Left Flexion AROM 267  -EN      III- MP AROM 85  -EN      III- PIP AROM 105  -EN      III- DIP AROM 85  -EN      III- FREDERICK Left Flexion AROM 275  -EN      IV- MP AROM 81  -EN      IV- PIP AROM 105  -EN      IV- DIP AROM 81  -EN      IV- FREDERICK Left Flexion AROM 267  -EN      V- MP AROM 85  -EN      V- PIP AROM 95  -EN      V- DIP AROM 86  -EN      V- FREDERICK Left Flexion AROM 266  -EN         Right Extension AROM    II- MP AROM -9  -EN      II- PIP AROM -23  -EN      III- MP AROM -74  -EN      III- PIP AROM -50  -EN      IV- MP AROM -49  -EN      IV- PIP AROM -42  -EN      V- MP AROM -49  -EN         Right Extension PROM    II- MP PROM --  PROM continues to be WNL in all digits  -EN         Right Flexion AROM    II- MP AROM 74  -EN      II- PIP AROM 105  -EN      II- DIP AROM 64  -EN      II- FREDERICK Right Flexion AROM 243  -EN      III- MP AROM 86  -EN      III- PIP AROM 97  -EN      III- DIP AROM 62  -EN      III- FREDERICK Right Flexion AROM 245  -EN      IV- MP AROM 70  -EN      IV- PIP AROM 97  -EN      IV- DIP AROM 62  -EN      IV- FREDERICK Right Flexion AROM 229  -EN      V- MP AROM 84  -EN      V- PIP AROM 95  -EN      V- DIP AROM 62  -EN      V- FREDERICK Right Flexion AROM 241  -EN         Right Thumb AROM    MP Flexion - AROM 50  -EN      IP Flexion - AROM 65  -EN      CMC Opposition (cm)- AROM --  Opposition WFL  -EN         Left Thumb AROM    MP Flexion - AROM 55  -EN      IP Flexion - AROM 70  -EN          Strength Right    # Reps 3  -EN      Right Rung 2  -EN      Right  Test 1 15  -EN      Right  Test 2 15  -EN      Right  Test 3 19  -EN       Strength Average Right 16.33  -EN          Strength Left    # Reps 3  -EN      Left Rung 2  -EN      Left  Test 1 35  -EN      Left  Test 2 34  -EN      Left  Test 3 27  -EN       Strength Average Left 32  -EN         Right Hand Strength - Pinch (lbs)    Lateral 7.3 lbs  -EN       Tip (3 point) 7.3 lbs  -EN         Left Hand Strength - Pinch (lbs)    Lateral 9.6 lbs  -EN      Tip (3 point) 12 lbs  -EN         Therapy Education    Education Details Encouraged to continue with theraputty exercises. Issued dycem for improved independence with opening jars.  -EN      Program Reinforced;New  -EN      How Provided Verbal;Demonstration  -EN      Provided to Patient  -EN      Level of Understanding Teach back education performed;Verbalized;Demonstrated  -EN            User Key  (r) = Recorded By, (t) = Taken By, (c) = Cosigned By    Initials Name Provider Type    Diana Grimes OTR Occupational Therapist                    Therapy Education  Education Details: Encouraged to continue with theraputty exercises. Issued dycem for improved independence with opening jars.  Program: Reinforced, New  How Provided: Verbal, Demonstration  Provided to: Patient  Level of Understanding: Teach back education performed, Verbalized, Demonstrated     OT Goals     Row Name 11/29/22 1600          OT Short Term Goals    STG Date to Achieve 12/27/22  -EN     STG 1 Patient will demonstrate improved right  strength by 5# for improved grasp of objects. (Currently 16.3 pounds)  -EN     STG 1 Progress Met;Goal Revised  -EN     STG 1 Progress Comments Improve to 22# by 4 weeks  -EN     STG 2 Patient will report modified independence with ADL/IADL routine (compensatory strategies, adaptive equipment, etc..)  -EN     STG 2 Progress Met  -EN        Long Term Goals    LTG Date to Achieve 01/10/23  -EN     LTG 1 Patient will demonstrate improved fine motor coordination evidenced by a 5 second improved 9 hole peg score (right hand)  -EN     LTG 1 Progress Met  -EN     LTG 2 Patient will demonstrate improved right hand  strength by 20 pounds for improved grasp of objects.  -EN     LTG 2 Progress Progressing;Ongoing  -EN     LTG 3 Patient will improve Quick Dash score 50 points.  -EN     LTG 3 Progress Ongoing   -EN           User Key  (r) = Recorded By, (t) = Taken By, (c) = Cosigned By    Initials Name Provider Type    Diana Grimes OTR Occupational Therapist                        OT Assessment/Plan     Row Name 11/29/22 1611          OT Assessment    Functional Limitations Limitation in home management;Limitations in community activities;Performance in leisure activities;Performance in self-care ADL  -EN     Impairments Coordination;Dexterity;Muscle strength;Pain;Range of motion  -EN     Assessment Comments Patient has made significant progress the past 4 weeks and reports she is feeling much better.  Right  strength improved from 2 pounds to 16.3 pounds, R lateral pinch improved from 3 pounds to 7.3 pounds and 3 point pinch improved form 2 pounds to 7.3 pounds. Patient 's 9 hole peg score (right hand) improved from 30.56 seconds to 18.78 seconds. Patient also demonstrated improved scores in the left UE as follows. L  improved from 22.67 pounds to 32 pounds, lateral pinch improved from 7.3 pounds to 9.6 pounds and 3 point pinch improved from 8 pounds to 12 pounds. Left 9 hole peg score improved from 22.75 seconds to 19.42 seconds. Patient also demonstrated improved B UE strength  (see MMT scores). Quick Dash score improved from 70.45 to 25. Patient reports she is modified independent with ADLs and feels she has made significant improvement with IVIG treatments.  -EN     OT Rehab Potential Good  -EN     Patient/caregiver participated in establishment of treatment plan and goals Yes  -EN        OT Plan    OT Plan Comments Continue with goals.  -EN           User Key  (r) = Recorded By, (t) = Taken By, (c) = Cosigned By    Initials Name Provider Type    Diana Grimes OTR Occupational Therapist               OT Exercises     Row Name 11/29/22 1500             Exercise 1    Exercise Name 1 review of HEP  -EN            User Key  (r) = Recorded By, (t) = Taken By, (c) = Cosigned By    Initials  Name Provider Type    EN Diana Richmond OTR Occupational Therapist                  9 Hole Peg  9-Hole Peg Left: 19.42  9-Hole Peg Right: 18.78  Quick DASH  Open a tight or new jar.: Moderate Difficulty  Do heavy household chores (e.g., wash walls, wash floors): Mild Difficulty  Carry a shopping bag or briefcase: Moderate Difficulty  Wash your back: No Difficulty  Use a knife to cut food: Mild Difficulty  Recreational activities in which you take some force or impact through your arm, should or hand (e.g. golf, hammering, tennis, etc.): Moderate Difficulty  During the past week, to what extent has your arm, shoulder, or hand problem interfered with your normal social activites with family, friends, neighbors or groups?: Slightly  During the past week, were you limited in your work or other regular daily activities as a result of your arm, shoulder or hand problem?: Slightly Limited  Arm, Shoulder, or hand pain: Mild  Tingling (pins and needles) in your arm, shoulder, or hand: None  During the past week, how much difficulty have you had sleeping because of the pain in your arm, shoulder or hand?: No difficulty  Number of Questions Answered: 11  Quick DASH Score: 25         Time Calculation:   OT Start Time: 0945  OT Stop Time: 1030  OT Time Calculation (min): 45 min     Therapy Charges for Today     Code Description Service Date Service Provider Modifiers Qty    [id number]  OT RE-EVAL 2 11/29/2022 Diana Richmond OTR GO 1                     JESSY Medina  11/29/2022

## 2022-11-29 NOTE — THERAPY PROGRESS REPORT/RE-CERT
Outpatient Physical Therapy Neuro Progress Note   [place]     Patient Name: Delphine King  : 1985  MRN: [id number]  Today's Date: 2022      Visit Date: 2022    Visit Dx:    ICD-10-CM ICD-9-CM   1. MMN (multifocal motor neuropathy) (HCC): Receieved IVIG  G61.82 357.89       Patient Active Problem List   Diagnosis   • Rectovaginal fistula: s/p repair in 2016   • History of pulmonary embolism: lifelong anticoagulation   • MMN (multifocal motor neuropathy) (HCC): Receieved IVIG            PT Neuro     Row Name 22 0900             Subjective Comments    Subjective Comments Pt reports she had second IVIG infusion the week of .  -JV         Precautions and Contraindications    Precautions/Limitations fall precautions  -JV         Subjective Pain    Able to rate subjective pain? yes  -JV      Pre-Treatment Pain Level 0  -JV      Post-Treatment Pain Level 0  -JV         MMT Right Lower Ext    Rt Hip Flexion MMT, Gross Movement (3+/5) fair plus  -JV      Rt Hip Extension MMT, Gross Movement (3+/5) fair plus  -JV      Rt Hip ABduction MMT, Gross Movement (3+/5) fair plus  -JV      Rt Hip ADduction MMT, Gross Movement (4-/5) good minus  -JV      Rt Knee Extension MMT, Gross Movement (4/5) good  -JV      Rt Ankle Dorsiflexion MMT, Gross Movement (4-/5) good minus  -JV         MMT Left Lower Ext    Lt Hip Flexion MMT, Gross Movement (4-/5) good minus  -JV      Lt Hip Extension MMT, Gross Movement (4-/5) good minus  -JV      Lt Hip ABduction MMT, Gross Movement (4-/5) good minus  -JV      Lt Hip ADduction MMT, Gross Movement (4-/5) good minus  -JV      Lt Knee Extension MMT, Gross Movement (4/5) good  -JV      Lt Ankle Dorsiflexion MMT, Gross Movement (4-/5) good minus  -JV         Balance Skills Training    Balance Comments See MILLER balance assessment  -JV            User Key  (r) = Recorded By, (t) = Taken By, (c) = Cosigned By    Initials Name Provider Type    Cleopatra Glynn,  "PT Physical Therapist                         PT Assessment/Plan     Row Name 11/29/22 1330          PT Assessment    Functional Limitations Decreased safety during functional activities;Impaired gait;Limitation in home management;Limitations in community activities;Performance in leisure activities;Performance in self-care ADL  -JV     Impairments Balance;Coordination;Endurance;Gait;Impaired muscle endurance;Motor function;Muscle strength  -JV     Assessment Comments The pt presents for re-assessment this date and reports second IVIG infusion the week of Thanksgiving. Pt reports feeling stronger and having better vishal for functional activity. Pt has been using AFO and reports less \"tripping\" and less energy required for walking. Pt's current status was measured with the LEFS, MILLER balance assessment, and 5XSST. The pt's score on the LEFS improved by 11 pts (MDC 9 pts), MILLER improved by 6 pts (MDC 4 pts), and 5XSST improved by 4.26 sec (MDC 3.6 to 4.2 sec). MMT of Oj LE indicates improved strength Oj LE's, especially at the R hip and knee. The pt has been performing CKC LE ther ex as able, and was encouraged to obtain a floor bike for use at home. Overall, the pt is doing well and has met LTG's #1 and 2, and partially met LTG #3. STG #1 is ongoing and STG #2 is new.  -JV     Rehab Potential Good  -JV     Patient/caregiver participated in establishment of treatment plan and goals Yes  -JV        PT Plan    PT Plan Comments Assess monthly  -JV           User Key  (r) = Recorded By, (t) = Taken By, (c) = Cosigned By    Initials Name Provider Type    Cleopatra Glynn, PT Physical Therapist                    OP Exercises     Row Name 11/29/22 0900             Subjective Comments    Subjective Comments Pt reports she had second IVIG infusion the week of Thanksgiving.  -JV         Subjective Pain    Able to rate subjective pain? yes  -JV      Pre-Treatment Pain Level 0  -JV      Post-Treatment Pain Level 0  -JV         "    User Key  (r) = Recorded By, (t) = Taken By, (c) = Cosigned By    Initials Name Provider Type    Cleopatra Glynn PT Physical Therapist                             PT OP Goals     Row Name 11/29/22 0900          PT Short Term Goals    STG Date to Achieve 12/29/22  -JV     STG 1 Pt will be I with HEP  -JV     STG 1 Progress Ongoing  -JV     STG 2 Admin TUG at next visit.  -JV     STG 2 Progress New  -JV        Long Term Goals    LTG Date to Achieve 05/01/23  -JV     LTG 1 Pt will improve score on LEFS by 9 pts.  -JV     LTG 1 Progress Met  -JV     LTG 1 Progress Comments Improved by 11 pts  -JV     LTG 2 Pt will improve score on MILLER by 4 pts.  -JV     LTG 2 Progress Met  -JV     LTG 2 Progress Comments Improved by 6 pts  -JV     LTG 3 Pt will decrease time on 5XSST by 5 seconds.  -JV     LTG 3 Progress Partially Met;Progressing  -JV     LTG 3 Progress Comments Time improved by 4.26 seconds  -JV        Time Calculation    PT Goal Re-Cert Due Date 12/29/22  -JV           User Key  (r) = Recorded By, (t) = Taken By, (c) = Cosigned By    Initials Name Provider Type    Cleoptara Glynn PT Physical Therapist                Therapy Education  Education Details: Pt was encouraged to obtain a floor bike for home use and cont standing LE ther ex as vishal.  Given: HEP  Program: New, Reinforced  How Provided: Verbal, Demonstration  Provided to: Patient  Level of Understanding: Teach back education performed, Verbalized, Demonstrated    Outcome Measure Options: 5x Sit to Stand, Miller Balance, Lower Extremity Functional Scale (LEFS)  5 Times Sit to Stand  5 Times Sit to Stand (seconds): 24.91 seconds  5 Times Sit to Stand Comments: Time of 33.85 without UE assist.  Miller Balance Scale  Sitting to Standing: able to stand without using hands and stabilize independently  Standing Unsupported: able to stand safely for 2 minutes  Sitting with Back Unsupported but Feet Supported on Floor or on Stool: able to sit safely and securely  for 2 minutes  Standing to Sitting: sits safely with minimal use of hands  Transfers: able to transfer safely with minor use of hands  Standing Unsupported with Eyes Closed: able to stand 10 seconds safely  Standing Unsupported with Feet Together: able to place feet together independently and stand 1 minute safely  Reaching Forward with Outstretched Arm While Standing: can reach forward confidently 25 cm (10 inches)   Object From the Floor From a Standing Position: able to  object safely and easily  Turning to Look Behind Over Left and Right Shoulders While Standing: looks behind from both sides and weight shifts well  Turn 360 Degrees: able to turn 360 degrees safely in 4 seconds or less  Place Alternate Foot on Step or Stool While Standing Unsupported: able to stand independently and safely and complete 8 steps in 20 seconds  Standing Unsupported with One Foot in Front: able to place foot tandem independently and hold 30 seconds  Standing on One Leg: able to lift leg independently and hold > 10 seconds  Neal Total Score: 56  Lower Extremity Functional Index  Any of your usual work, housework or school activities: Moderate difficulty  Your usual hobbies, recreational or sporting activities: Quite a bit of difficulty  Getting into or out of the bath: Moderate difficulty  Walking between rooms: A little bit of difficulty  Putting on your shoes or socks: Quite a bit of difficulty  Squatting: Quite a bit of difficulty  Lifting an object, like a bag of groceries from the floor: Moderate difficulty  Performing light activities around your home: A little bit of difficulty  Performing heavy activities around your home: Quite a bit of difficulty  Getting into or out of a car: Moderate difficulty  Walking 2 blocks: Quite a bit of difficulty  Walking a mile: Quite a bit of difficulty  Going up or down 10 stairs (about 1 flight of stairs): Moderate difficulty  Standing for 1 hour: A little bit of  difficulty  Sitting for 1 hour: A little bit of difficulty  Running on even ground: Extreme difficulty or unable to perform activity  Running on uneven ground: Extreme difficulty or unable to perform activity  Making sharp turns while running fast: Extreme difficulty or unable to perform activity  Hopping: Extreme difficulty or unable to perform activity  Rolling over in bed: A little bit of difficulty  Total: 31      Time Calculation:   Start Time: 0900  Stop Time: 0945  Time Calculation (min): 45 min   Therapy Charges for Today     Code Description Service Date Service Provider Modifiers Qty    94815140838  PT NEUROMUSC RE EDUCATION EA 15 MIN 11/29/2022 Cleopatra Hoffmann, PT GP 1    [id number]  PT THERAPEUTIC ACT EA 15 MIN 11/29/2022 Cleopatra Hoffmann, PT GP 1    [id number]  PT THER PROC EA 15 MIN 11/29/2022 Cleopatra Hoffmann, PT GP 1          PT G-Codes  Outcome Measure Options: 5x Sit to Stand, Neal Balance, Lower Extremity Functional Scale (LEFS)  Neal Total Score: 56  Total: 31         Cleopatra Hoffmann PT  11/29/2022

## 2022-12-28 ENCOUNTER — HOSPITAL ENCOUNTER (OUTPATIENT)
Dept: PHYSICAL THERAPY | Facility: HOSPITAL | Age: 37
Setting detail: THERAPIES SERIES
Discharge: HOME | End: 2022-12-28

## 2022-12-28 ENCOUNTER — HOSPITAL ENCOUNTER (OUTPATIENT)
Dept: OCCUPATIONAL THERAPY | Facility: HOSPITAL | Age: 37
Setting detail: THERAPIES SERIES
Discharge: HOME | End: 2022-12-28

## 2022-12-28 DIAGNOSIS — G61.82 MMN (MULTIFOCAL MOTOR NEUROPATHY): Primary | ICD-10-CM

## 2022-12-28 DIAGNOSIS — G61.82 MULTIFOCAL MOTOR NEUROPATHY: Primary | ICD-10-CM

## 2022-12-28 DIAGNOSIS — G61.82 MMN (MULTIFOCAL MOTOR NEUROPATHY): ICD-10-CM

## 2022-12-28 PROCEDURE — 97161 PT EVAL LOW COMPLEX 20 MIN: CPT

## 2022-12-28 PROCEDURE — 97168 OT RE-EVAL EST PLAN CARE: CPT

## 2022-12-28 NOTE — THERAPY RE-EVALUATION
Outpatient Occupational Therapy Neuro Re-Evaluation   [place]     Patient Name: Delphine King  : 1985  MRN: [id number]  Today's Date: 2022      Visit Date: 2022    Patient Active Problem List   Diagnosis   • Rectovaginal fistula: s/p repair in 2016   • History of pulmonary embolism: lifelong anticoagulation   • MMN (multifocal motor neuropathy) (HCC): Receieved IVIG        Past Medical History:   Diagnosis Date   • Anxiety    • Depression    • MMN (multifocal motor neuropathy) (HCC)         Past Surgical History:   Procedure Laterality Date   • ANAL FISTULA REPAIR     • HYSTERECTOMY     • TONSILLECTOMY           Visit Dx:      ICD-10-CM ICD-9-CM   1. Multifocal motor neuropathy (HCC)  G61.82 357.89   2. MMN (multifocal motor neuropathy) (HCC): Receieved IVIG  G61.82 357.89            OT Neuro     Row Name 22 1200             Subjective Comments    Subjective Comments Patient reports she had third IVIG treatment last week and feels much better. Patient reports she starts to have fatigue about a week before scheduled infusions.  -EN         Subjective Pain    Able to rate subjective pain? yes  -EN      Subjective Pain Comment Patient reports occasional pain in the right arm (2/10)  -EN         Sensation    Green Monofilament Results reports no numbness/tingling today. Reports occasional tingling in right UE  -EN         General ROM    RT Upper Ext Rt Shoulder ABduction;Rt Shoulder Flexion;Rt Elbow Supination;Rt Elbow Pronation;Rt Wrist Flexion;Rt Wrist Extension  -EN      LT Upper Ext Lt Shoulder ABduction;Lt Shoulder Flexion;Lt Elbow Extension/Flexion;Lt Elbow Supination;Lt Elbow Pronation  -EN         Right Upper Ext    Rt Shoulder Abduction AROM WFL  -EN      Rt Shoulder Flexion AROM WFL  -EN      Rt Elbow Supination AROM WFL  -EN      Rt Elbow Pronation AROM WFL  -EN      Rt Wrist Flexion AROM 81  -EN      Rt Wrist Extension AROM 54  -EN         Left Upper Ext    Lt Shoulder  Abduction AROM WFL  -EN      Lt Shoulder Flexion AROM WFL  -EN      Lt Elbow Extension/Flexion AROM WFL  -EN      Lt Elbow Supination AROM WFL  -EN      Lt Elbow Pronation AROM WFL  -EN      Lt Wrist Flexion AROM 88  -EN      Lt Wrist Extension AROM 81  -EN         MMT Right Upper Ext    Rt Shoulder Flexion MMT, Gross Movement (4/5) good  -EN      Rt Shoulder Extension MMT, Gross Movement (4+/5) good plus  -EN      Rt Shoulder ABduction MMT, Gross Movement (4/5) good  -EN      Rt Shoulder ADduction MMT, Gross Movement (4+/5) good plus  -EN      Rt Elbow Flexion MMT, Gross Movement: (4+/5) good plus  -EN      Rt Elbow Extension MMT, Gross Movement: (4/5) good  -EN      Rt Forearm Supination MMT, Gross Movement (4+/5) good plus  -EN      Rt Forearm Pronation MMT, Gross Movement (4+/5) good plus  -EN      Rt Wrist Flexion MMT, Gross Movement (4-/5) good minus  -EN      Rt Wrist Extension MMT, Gross Movement (3+/5) fair plus  -EN         MMT Left Upper Ext    Lt Shoulder Flexion MMT, Gross Movement (4+/5) good plus  -EN      Lt Shoulder Extension MMT, Gross Movement (5/5) normal  -EN      Lt Shoulder ABduction MMT, Gross Movement (4+/5) good plus  -EN      Lt Shoulder ADduction MMT, Gross Movement (5/5) normal  -EN      Lt Elbow Flexion MMT, Gross Movement (5/5) normal  -EN      Lt Elbow Extension MMT, Gross Movement (5/5) normal  -EN      Lt Forearm Supination MMT, Gross Movement (5/5) normal  -EN      Lt Forearm Pronation MMT, Gross Movement (5/5) normal  -EN      Lt Wrist Flexion MMT, Gross Movement (5/5) normal  -EN      Lt Wrist Extension MMT, Gross Movement (5/5) normal  -EN            User Key  (r) = Recorded By, (t) = Taken By, (c) = Cosigned By    Initials Name Provider Type    Diana Grimes OTR Occupational Therapist                Hand Therapy (last 24 hours)     Hand Eval     Row Name 12/28/22 0900             Left Flexion AROM    II- MP AROM 84  -EN      II- PIP AROM 97  -EN      II- DIP AROM 70   -EN      II- FREDERICK Left Flexion AROM 251  -EN      III- MP AROM 85  -EN      III- PIP AROM 101  -EN      III- DIP AROM 75  -EN      III- FREDERICK Left Flexion AROM 261  -EN      IV- MP AROM 82  -EN      IV- PIP AROM 100  -EN      IV- DIP AROM 70  -EN      IV- FREDERICK Left Flexion AROM 252  -EN      V- MP AROM 85  -EN      V- PIP AROM 92  -EN      V- DIP AROM 75  -EN      V- FREDERICK Left Flexion AROM 252  -EN         Right Extension AROM    II- MP AROM 0  -EN      II- PIP AROM -23  -EN      III- MP AROM -60  -EN      III- PIP AROM -35  -EN      IV- MP AROM -60  -EN      IV- PIP AROM -50  -EN      V- MP AROM -58  -EN      V- PIP AROM -51  -EN         Right Flexion AROM    II- MP AROM 71  -EN      II- PIP AROM 95  -EN      II- DIP AROM 60  -EN      II- FREDERICK Right Flexion AROM 226  -EN      III- MP AROM 80  -EN      III- PIP AROM 85  -EN      III- DIP AROM 52  -EN      III- FREDERICK Right Flexion AROM 217  -EN      IV- MP AROM 75  -EN      IV- PIP AROM 90  -EN      IV- DIP AROM 49  -EN      IV- FREDERICK Right Flexion AROM 214  -EN      V- MP AROM 81  -EN      V- PIP AROM 93  -EN      V- DIP AROM 65  -EN      V- FREDERICK Right Flexion AROM 239  -EN         Right Thumb AROM    MP Flexion - AROM 54  -EN      IP Flexion - AROM 57  -EN      CMC Opposition (cm)- AROM --  WFL  -EN         Left Thumb AROM    MP Flexion - AROM 53  -EN      IP Flexion - AROM 67  -EN          Strength Right    # Reps 3  -EN      Right Rung 2  -EN      Right  Test 1 16  -EN      Right  Test 2 20  -EN      Right  Test 3 18  -EN       Strength Average Right 18  -EN          Strength Left    # Reps 3  -EN      Left Rung 2  -EN      Left  Test 1 51  -EN      Left  Test 2 50  -EN      Left  Test 3 56  -EN       Strength Average Left 52.33  -EN         Right Hand Strength - Pinch (lbs)    Lateral 7 lbs  -EN (7,7,7)      Tip (3 point) 7 lbs  -EN (7,7,7)         Left Hand Strength - Pinch (lbs)    Lateral 11.33 lbs  -EN (10,12,12)      Tip (3  point) 14.33 lbs  -EN (15,14,14)         Therapy Education    Education Details Issued green theraputty to add to yellow for right hand and green putty for left hand. Patient reports adaptive equipment and compensatory techniques have helped a lot during ADL/IADL routine.  -EN      Program Reinforced;New  -EN      How Provided Verbal;Demonstration  -EN      Provided to Patient  -EN      Level of Understanding Teach back education performed;Verbalized;Demonstrated  -EN            User Key  (r) = Recorded By, (t) = Taken By, (c) = Cosigned By    Initials Name Provider Type    Diana Grimes OTR Occupational Therapist                    Therapy Education  Education Details: Given green putty to add to yellow for right hand and green putty for left hand. Patient reports adaptive equipment and compensatory techniques have helped a lot during ADL/IADL routine.  Program: Reinforced, New  How Provided: Verbal, Demonstration  Provided to: Patient  Level of Understanding: Teach back education performed, Verbalized, Demonstrated     OT Goals     Row Name 12/28/22 0900          OT Short Term Goals    STG Date to Achieve 12/27/22  -EN     STG 1 Patient will demonstrate improved right  strength by 5# for improved grasp of objects. (Currently 16.3 pounds)  -EN     STG 1 Progress Goal Revised;Ongoing;Progressing  -EN     STG 1 Progress Comments currently 18 pounds  -EN     STG 2 Patient will report modified independence with ADL/IADL routine (compensatory strategies, adaptive equipment, etc..)  -EN     STG 2 Progress Met  -EN        Long Term Goals    LTG Date to Achieve 01/10/23  -EN     LTG 1 Patient will demonstrate improved fine motor coordination evidenced by a 5 second improved 9 hole peg score (right hand)  -EN     LTG 1 Progress Met  -EN     LTG 2 Patient will demonstrate improved right hand  strength by 20 pounds for improved grasp of objects.  -EN     LTG 2 Progress Progressing;Ongoing  -EN     LTG 3  Patient will improve Quick Dash score 50 points.  -EN     LTG 3 Progress Ongoing  -EN           User Key  (r) = Recorded By, (t) = Taken By, (c) = Cosigned By    Initials Name Provider Type    Diana Grimes OTR Occupational Therapist                        OT Assessment/Plan     Row Name 12/28/22 1113 12/28/22 7766       OT Assessment    Functional Limitations -- Limitation in home management;Limitations in community activities;Performance in leisure activities;Performance in self-care ADL  -EN    Impairments -- Coordination;Dexterity;Muscle strength;Pain;Range of motion  -EN    Assessment Comments Patient reports she had her third IVIG treatment last week and feels much stronger. Patient states she has been using her adaptive equipment and compensatory strategies for ADL/IADL routine successfully. Patients bilateral shoulder, elbow, forearm and wrist AROM/strength is functional. R hand ROM has not improved however right  strength has improved. Hand strength measurements as follows: Right hand 11/1/22 was 2#, on 11/29 was 16# and 12/28 is 18#. L hand  on 11/1/22 was 22.67#, on 11/29 was 32# and 12/28/22 is 52.33# .  Right lateral pinch on 11/1/22 was 3# (tip pinch 2#), 11/29 was 7.3# (tip pinch 7.3#) and 12/28/22 is 7# (tip pinch 7#). Right 9 hole peg scores as follows: 11/1/22 was 30.56 seconds, 11/29 was 18.78 seconds, and 12/28/22 is 19.33 seconds. Left 9 Hole Peg scores: 11/1/22 was 22.75 seconds, 11/29 was 19.42 seconds, and 12/28/22 is 18.95 seconds. Patient's Quick Dash scores have fluctuated. Initial score on 11/1/22 was 56, on 11/29/22 was 25 and 12/28/22 is 54.55. Overall patient reports she feels stronger after treatments and is doing well managing self care activities.  -EN --    OT Diagnosis MMN, decreased UE functional use (oscar R UE), decreased ADL/IADL independence  -EN --    OT Rehab Potential Good  -EN --    Patient/caregiver participated in establishment of treatment plan and  goals Yes  -EN --    Patient would benefit from skilled therapy intervention Yes  -EN --       OT Plan    OT Frequency Other (comment)  One time per month, pt will work on HEP and continue with IVIG treatment. Will progress HEP appropriately.  -EN --    Planned CPT's? OT EVAL LOW COMPLEXITY: 43931;OT THER ACT EA 15 MIN: [id number]  -EN --    Planned Therapy Interventions (Optional Details) home exercise program;patient/family education;strengthening  -EN --    OT Plan Comments Continue with plan of care.  -EN --          User Key  (r) = Recorded By, (t) = Taken By, (c) = Cosigned By    Initials Name Provider Type    Diana Grimes OTR Occupational Therapist               OT Exercises     Row Name 12/28/22 0900             Exercise 1    Exercise Name 1 review of HEP, green theraputty added to yellow for right hand, green putty for left hand  -EN         Exercise 2    Exercise Name 2 reviewed compensatory strategies  -EN            User Key  (r) = Recorded By, (t) = Taken By, (c) = Cosigned By    Initials Name Provider Type    Diana Grimes OTR Occupational Therapist                  9 Hole Peg  9-Hole Peg Left: 18.95  9-Hole Peg Right: 19.33  Quick DASH  Open a tight or new jar.: Moderate Difficulty  Do heavy household chores (e.g., wash walls, wash floors): Severe Difficulty  Carry a shopping bag or briefcase: Moderate Difficulty  Wash your back: Unable  Use a knife to cut food: Moderate Difficulty  Recreational activities in which you take some force or impact through your arm, should or hand (e.g. golf, hammering, tennis, etc.): Unable  During the past week, to what extent has your arm, shoulder, or hand problem interfered with your normal social activites with family, friends, neighbors or groups?: Slightly  During the past week, were you limited in your work or other regular daily activities as a result of your arm, shoulder or hand problem?: Moderately Limited  Arm, Shoulder, or hand pain:  Moderate  Tingling (pins and needles) in your arm, shoulder, or hand: Moderate  During the past week, how much difficulty have you had sleeping because of the pain in your arm, shoulder or hand?: No difficulty  Number of Questions Answered: 11  Quick DASH Score: 54.55         Time Calculation:   OT Start Time: 0900  OT Stop Time: 0940  OT Time Calculation (min): 40 min     Therapy Charges for Today     Code Description Service Date Service Provider Modifiers Qty    [id number]  OT RE-EVAL 2 12/28/2022 Diana Richmond OTR GO 1                     JESSY Medina  12/28/2022

## 2022-12-28 NOTE — THERAPY RE-EVALUATION
.Outpatient Physical Therapy Neuro Re-Evaluation   [place]     Patient Name: Delphine King  : 1985  MRN: [id number]  Today's Date: 2022      Visit Date: 2022    Patient Active Problem List   Diagnosis   • Rectovaginal fistula: s/p repair in 2016   • History of pulmonary embolism: lifelong anticoagulation   • MMN (multifocal motor neuropathy) (HCC): Receieved IVIG        Past Medical History:   Diagnosis Date   • Anxiety    • Depression    • MMN (multifocal motor neuropathy) (HCC)         Past Surgical History:   Procedure Laterality Date   • ANAL FISTULA REPAIR     • HYSTERECTOMY     • TONSILLECTOMY           Visit Dx:     ICD-10-CM ICD-9-CM   1. MMN (multifocal motor neuropathy) (HCC): Receieved IVIG  G61.82 357.89                PT Neuro     Row Name 22 1000             Subjective Pain    Able to rate subjective pain? yes  -JV      Pre-Treatment Pain Level 0  -JV      Post-Treatment Pain Level 0  -JV         MMT Right Lower Ext    Rt Hip Flexion MMT, Gross Movement (4-/5) good minus  -JV      Rt Hip Extension MMT, Gross Movement (4-/5) good minus  -JV      Rt Hip ABduction MMT, Gross Movement (4-/5) good minus  -JV      Rt Hip ADduction MMT, Gross Movement (4/5) good  -JV      Rt Knee Extension MMT, Gross Movement (4/5) good  -JV      Rt Ankle Dorsiflexion MMT, Gross Movement (4-/5) good minus  -JV         MMT Left Lower Ext    Lt Hip Flexion MMT, Gross Movement (4-/5) good minus  -JV      Lt Hip Extension MMT, Gross Movement (4-/5) good minus  -JV      Lt Hip ABduction MMT, Gross Movement (4-/5) good minus  -JV      Lt Hip ADduction MMT, Gross Movement (4/5) good  -JV      Lt Knee Extension MMT, Gross Movement (4/5) good  -JV      Lt Ankle Dorsiflexion MMT, Gross Movement (4-/5) good minus  -JV         Bed Mobility    Comment, (Bed Mobility) Pt reports  increased difficulty rolling over in bed and getting up to EOB.  -JV            User Key  (r) = Recorded By, (t) = Taken By,  (c) = Cosigned By    Initials Name Provider Type    Cleopatra Glynn, PT Physical Therapist                        Therapy Education  Education Details: Encouraged pt to cont standing LE ther ex, add bridges and seated hip ABD vs red T'band.  Given: HEP  Program: Reinforced, New  How Provided: Verbal, Demonstration  Provided to: Patient  Level of Understanding: Teach back education performed, Verbalized, Demonstrated     PT OP Goals     Row Name 12/28/22 1000          PT Short Term Goals    STG Date to Achieve 12/29/22  -JV     STG 1 Pt will be I with HEP  -JV     STG 1 Progress Ongoing  -JV     STG 2 Admin TUG at next visit.  -JV     STG 2 Progress Met  -JV        Long Term Goals    LTG Date to Achieve 05/01/23  -JV     LTG 1 Pt will improve score on LEFS by 9 pts.  -JV     LTG 1 Progress Met  -JV     LTG 2 Pt will improve score on MILLER by 4 pts.  -JV     LTG 2 Progress Met  -JV     LTG 3 Pt will decrease time on 5XSST by 5 seconds.  -JV     LTG 3 Progress Met  -JV     LTG 4 Pt will increase Oj LE strength to 4/5  -JV     LTG 4 Progress New  -JV        Time Calculation    PT Goal Re-Cert Due Date 01/26/23  -JV           User Key  (r) = Recorded By, (t) = Taken By, (c) = Cosigned By    Initials Name Provider Type    Cleopatra Glynn PT Physical Therapist                 PT Assessment/Plan     Row Name 12/28/22 1540          PT Assessment    Assessment Comments The pt presents for tx and re-assessment this week after IVIG infusion on 12/21/22. Pt reports compliance with HEP and has been seen by orthotist to begin fabrication of AFO. Pt has experienced no new falls and states that legs feel stronger. Pt's status was assessed with the LEFS, 5XSST and the TUG. Score on the LEFS remains essentially the same. Time on the 5XSST improved from 33.85 sec to 23.97 sec (MDC 3.6 to 4.2 sec). TUG trial 1 was 9.77 sec and trial 2 was 9.55 sec. MMT indicates increased strength oscar the R hip and knee. The pt was provided with  red theraband and instructed to perform seated Oj hip ABD and add bridges to HEP. The pt is making good progress toward outcomes and has met STG #2 and LTG's #1, 2, and 3. STG #1 is ongoing as HEP is updated.  -JV     Please refer to paper survey for additional self-reported information Yes  -JV     Rehab Potential Good  -JV     Patient/caregiver participated in establishment of treatment plan and goals Yes  -JV        PT Plan    PT Plan Comments Assess monthly  -JV           User Key  (r) = Recorded By, (t) = Taken By, (c) = Cosigned By    Initials Name Provider Type    Cleopatra Glynn PT Physical Therapist                    OP Exercises     Row Name 12/28/22 1000             Precautions    Existing Precautions/Restrictions fall  -JV         Subjective Comments    Subjective Comments Pt reports compliance with HEP. She has been seen by orthotist for AFO fabrication.  -JV         Subjective Pain    Able to rate subjective pain? yes  -JV      Pre-Treatment Pain Level 0  -JV      Post-Treatment Pain Level 0  -JV         Exercise 1    Exercise Name 1 Pt performed seated hip ABD vs. red T'band  -JV      Cueing 1 Verbal;Demo  -JV      Sets 1 1  -JV      Reps 1 10  -JV            User Key  (r) = Recorded By, (t) = Taken By, (c) = Cosigned By    Initials Name Provider Type    Cleopatra Glynn PT Physical Therapist                            Outcome Measure Options: 5x Sit to Stand, Lower Extremity Functional Scale (LEFS), Timed Up and Go (TUG)  5 Times Sit to Stand  5 Times Sit to Stand (seconds): 23.97 seconds  5 Times Sit to Stand Comments: Completed without UE support.  Lower Extremity Functional Index  Any of your usual work, housework or school activities: Moderate difficulty  Your usual hobbies, recreational or sporting activities: Quite a bit of difficulty  Getting into or out of the bath: Moderate difficulty  Walking between rooms: A little bit of difficulty  Putting on your shoes or socks: Moderate  difficulty  Squatting: Quite a bit of difficulty  Lifting an object, like a bag of groceries from the floor: Moderate difficulty  Performing light activities around your home: A little bit of difficulty  Performing heavy activities around your home: Quite a bit of difficulty  Getting into or out of a car: Moderate difficulty  Walking 2 blocks: Moderate difficulty  Walking a mile: Quite a bit of difficulty  Going up or down 10 stairs (about 1 flight of stairs): Moderate difficulty  Standing for 1 hour: Moderate difficulty  Sitting for 1 hour: Moderate difficulty  Running on even ground: Extreme difficulty or unable to perform activity  Running on uneven ground: Extreme difficulty or unable to perform activity  Making sharp turns while running fast: Extreme difficulty or unable to perform activity  Hopping: Extreme difficulty or unable to perform activity  Rolling over in bed: Moderate difficulty  Total: 30  Timed Up and Go (TUG)  TUG Test 1: 9.77 seconds  TUG Test 2: 9.55 seconds    Time Calculation:   Start Time: 1000  Stop Time: 1045  Time Calculation (min): 45 min   Therapy Charges for Today     Code Description Service Date Service Provider Modifiers Qty    [id number]  PT EVAL LOW COMPLEXITY 3 12/28/2022 Cleopatra Hoffmann, PT GP 1          PT G-Codes  Outcome Measure Options: 5x Sit to Stand, Lower Extremity Functional Scale (LEFS), Timed Up and Go (TUG)  Total: 30  TUG Test 1: 9.77 seconds  TUG Test 2: 9.55 seconds         Cleopatra Hoffmann, PT  12/28/2022

## 2023-02-21 ENCOUNTER — HOSPITAL ENCOUNTER (OUTPATIENT)
Dept: PHYSICAL THERAPY | Facility: HOSPITAL | Age: 38
Setting detail: THERAPIES SERIES
Discharge: HOME | End: 2023-02-21
Payer: COMMERCIAL

## 2023-02-21 ENCOUNTER — HOSPITAL ENCOUNTER (OUTPATIENT)
Dept: OCCUPATIONAL THERAPY | Facility: HOSPITAL | Age: 38
Setting detail: THERAPIES SERIES
Discharge: HOME | End: 2023-02-21
Payer: COMMERCIAL

## 2023-02-21 DIAGNOSIS — G61.82 MMN (MULTIFOCAL MOTOR NEUROPATHY): Primary | ICD-10-CM

## 2023-02-21 DIAGNOSIS — G61.82 MULTIFOCAL MOTOR NEUROPATHY: Primary | ICD-10-CM

## 2023-02-21 DIAGNOSIS — G61.82 MMN (MULTIFOCAL MOTOR NEUROPATHY): ICD-10-CM

## 2023-02-21 PROCEDURE — 97164 PT RE-EVAL EST PLAN CARE: CPT

## 2023-02-21 PROCEDURE — 97168 OT RE-EVAL EST PLAN CARE: CPT

## 2023-02-21 PROCEDURE — 97110 THERAPEUTIC EXERCISES: CPT

## 2023-02-21 NOTE — THERAPY EVALUATION
" Outpatient Occupational Therapy Neuro Re-Evaluation   [place]     Patient Name: Delphine King  : 1985  MRN: [id number]  Today's Date: 2023      Visit Date: 2023    Patient Active Problem List   Diagnosis   • Rectovaginal fistula: s/p repair in 2016   • History of pulmonary embolism: lifelong anticoagulation   • MMN (multifocal motor neuropathy) (HCC): Receieved IVIG        Past Medical History:   Diagnosis Date   • Anxiety    • Depression    • MMN (multifocal motor neuropathy) (HCC)         Past Surgical History:   Procedure Laterality Date   • ANAL FISTULA REPAIR     • HYSTERECTOMY     • TONSILLECTOMY           Visit Dx:      ICD-10-CM ICD-9-CM   1. Multifocal motor neuropathy (HCC)  G61.82 357.89   2. MMN (multifocal motor neuropathy) (HCC): Receieved IVIG  G61.82 357.89            OT Neuro     Row Name 23 1000             Subjective Comments    Subjective Comments Patient reports she received IVIG therapy last week and felt better following. Patient reports she feels the treatments \"aren't as much\".  Patient reports they have decreased to 3 days a month the past 2-3 months.  -EN         Subjective Pain    Subjective Pain Comment Patient reports no pain, \"just fatigue\"  -EN         Sensation    Green Monofilament Results reports no numbness  -EN         Right Upper Ext    Rt Shoulder Abduction AROM WFL  -EN      Rt Shoulder Flexion AROM WFL  -EN      Rt Elbow Supination AROM WFL  -EN      Rt Elbow Pronation AROM WFL  -EN      Rt Wrist Flexion AROM WFL  -EN         Left Upper Ext    Lt Shoulder Abduction AROM WFL  -EN      Lt Shoulder Flexion AROM WFL  -EN      Lt Elbow Extension/Flexion AROM WFL  -EN      Lt Elbow Supination AROM WFL  -EN      Lt Elbow Pronation AROM WFL  -EN      Lt Wrist Flexion AROM WFL  -EN      Lt Wrist Extension AROM WFL  -EN         MMT Right Upper Ext    Rt Shoulder Flexion MMT, Gross Movement (4/5) good  -EN      Rt Shoulder Extension MMT, Gross Movement " (4+/5) good plus  -EN      Rt Shoulder ABduction MMT, Gross Movement (4/5) good  -EN      Rt Shoulder ADduction MMT, Gross Movement (4+/5) good plus  -EN      Rt Elbow Flexion MMT, Gross Movement: (4+/5) good plus  -EN      Rt Elbow Extension MMT, Gross Movement: (4+/5) good plus  -EN      Rt Forearm Supination MMT, Gross Movement (4+/5) good plus  -EN      Rt Forearm Pronation MMT, Gross Movement (4+/5) good plus  -EN      Rt Wrist Flexion MMT, Gross Movement (3+/5) fair plus  -EN      Rt Wrist Extension MMT, Gross Movement (3+/5) fair plus  -EN         MMT Left Upper Ext    Lt Shoulder Flexion MMT, Gross Movement (5/5) normal  -EN      Lt Shoulder Extension MMT, Gross Movement (5/5) normal  -EN      Lt Shoulder ABduction MMT, Gross Movement (5/5) normal  -EN      Lt Shoulder ADduction MMT, Gross Movement (5/5) normal  -EN      Lt Elbow Flexion MMT, Gross Movement (5/5) normal  -EN      Lt Elbow Extension MMT, Gross Movement (5/5) normal  -EN      Lt Forearm Supination MMT, Gross Movement (5/5) normal  -EN      Lt Forearm Pronation MMT, Gross Movement (5/5) normal  -EN      Lt Wrist Flexion MMT, Gross Movement (5/5) normal  -EN      Lt Wrist Extension MMT, Gross Movement (5/5) normal  -EN            User Key  (r) = Recorded By, (t) = Taken By, (c) = Cosigned By    Initials Name Provider Type    Diana Grimes, OTR Occupational Therapist                Hand Therapy (last 24 hours)     Hand Eval     Row Name 02/21/23 1000             Right Extension AROM    II- MP AROM 0  -EN      II- PIP AROM 0  -EN      II- DIP AROM 0  -EN      II- FRED[place]ICK Right Extension AROM 0  -EN      III- MP AROM -64  -EN      III- PIP AROM -37  -EN      IV- MP AROM -41  -EN      IV- PIP AROM -35  -EN      V- MP AROM -50  -EN      V- PIP AROM -12  -EN         Right Flexion AROM    II- MP AROM 74  -EN      II- PIP AROM 100  -EN      II- DIP AROM 57  -EN      II- [place] Right Flexion AROM 231  -EN      III- MP AROM 80  -EN      III- PIP AROM  100  -EN      III- DIP AROM 65  -EN      III- [place] Right Flexion AROM 245  -EN      IV- MP AROM 81  -EN      IV- PIP AROM 103  -EN      IV- DIP AROM 64  -EN      IV- [place] Right Flexion AROM 248  -EN      V- MP AROM 85  -EN      V- PIP AROM 95  -EN      V- DIP AROM 63  -EN      V- [place] Right Flexion AROM 243  -EN         Right Thumb AROM    MP Flexion - AROM 52  -EN      IP Flexion - AROM 54  -EN          Strength Right    # Reps 3  -EN      Right Rung 2  -EN      Right  Test 1 24  -EN      Right  Test 2 24  -EN      Right  Test 3 28  -EN       Strength Average Right 25.33  -EN          Strength Left    # Reps 3  -EN      Left Rung 2  -EN      Left  Test 1 46  -EN      Left  Test 2 58  -EN      Left  Test 3 64  -EN       Strength Average Left 56  -EN         Right Hand Strength - Pinch (lbs)    Lateral 6 lbs  -EN      Tip (3 point) 8 lbs  -EN         Left Hand Strength - Pinch (lbs)    Lateral 13.3 lbs  -EN      Tip (3 point) 16.3 lbs  -EN         Therapy Education    Education Details reviewed HEP, added FMC including stacking coins, in hand manipulation, etc...  -EN      Given HEP;Symptoms/condition management  -EN      Program Reinforced;Progressed  -EN      How Provided Verbal;Demonstration  -EN      Provided to Patient  -EN      Level of Understanding Teach back education performed;Verbalized  -EN            User Key  (r) = Recorded By, (t) = Taken By, (c) = Cosigned By    Initials Name Provider Type    Diana Grimes OTR Occupational Therapist                    Therapy Education  Education Details: reviewed HEP, added FMC including stacking coins, in hand manipulation, etc...  Given: HEP, Symptoms/condition management  Program: Reinforced, Progressed  How Provided: Verbal, Demonstration  Provided to: Patient  Level of Understanding: Teach back education performed, Verbalized     OT Goals     Row Name 02/21/23 1000          OT Short Term Goals    STG Date to  Achieve 03/21/23  -EN     STG 1 Patient will demonstrate improved right  strength by 5# for improved grasp of objects. (Currently 25# pounds)  -EN     STG 1 Progress Met;Goal Revised  -EN     STG 1 Progress Comments currently 25.33, revised to improve to 30#  -EN     STG 2 Patient will report modified independence with ADL/IADL routine (compensatory strategies, adaptive equipment, etc..)  -EN     STG 2 Progress Met  -EN        Long Term Goals    LTG Date to Achieve 04/11/23  -EN     LTG 1 Patient will demonstrate improved fine motor coordination evidenced by a 5 second improved 9 hole peg score (right hand)  -EN     LTG 1 Progress Met  -EN     LTG 2 Patient will demonstrate improved right hand  strength by 20 pounds for improved grasp of objects.  -EN     LTG 2 Progress Met;Goal Revised  -EN     LTG 2 Progress Comments initially 2#, currently 25.33 pounds. Revised to right  35#.  -EN     LTG 3 Patient will improve Quick Dash score 50 points.  -EN     LTG 3 Progress Ongoing  -EN           User Key  (r) = Recorded By, (t) = Taken By, (c) = Cosigned By    Initials Name Provider Type    Diana Grimes OTR Occupational Therapist                        OT Assessment/Plan     Row Name 02/21/23 1230          OT Assessment    Assessment Comments Patient reports she continues to use adaptive equipment and compensatory strategies and performing ADLs/IADLs independently. No significant changes in B shoulder, elbow, forearm, wrist and hand AROM. L shoulder strength did improve and patient reports she feels both shoulders feel a little stronger.  and pinch strength have improved since last assessed on 12/28/22. Measurements as follows:  R  strength on 11/1/22 was 2#, 11/29 was 16#, 12/28  was 18# and today was 25.33#. R lateral pinch  on 11/1 was 3#, 11/29 was 7.3#, 12/28 was 7# and today was 6#. R tip pinch on 11/1 was 2#, 11/29 was 7.3#, 12/28 was 7# and today was 8#.  Right 9 Hole peg score on  11/1 was 30.56 seconds, 11/29 was 18.78 seconds, 12/28 was 19.33 seconds and today was 18.94 seconds. Left  strength on 11/1 was 22.32#, 11/29 was 32#, 12/28 was 52,3# and today was 56#. L lateral pinch on 11/29 was 7.3#, 11/29 was 9.6#, 12/28 was 11.3 # and today was 13. 3#. L tip pinch on 11/1 was 8#, 11/29 was 12#, 12/28 was 14.3# and today was 13.3#. Left 9 Hole peg time on 11/1 was 22.75 seconds, 11/29 was 19.42 seconds, 12/28 was 18.95 seconds and today was 18.16 seconds. Quick Dash scores have fluctuated: 11/1 was 70.45, 11/29 was 25, 12/28 was 54.55 and today was 61.36.  -EN     OT Diagnosis MMN, decreased UE functional use (oscar R UE)  -EN     OT Rehab Potential Good  -EN     Patient/caregiver participated in establishment of treatment plan and goals Yes  -EN     Patient would benefit from skilled therapy intervention Yes  -EN        OT Plan    OT Frequency Other (comment)  One time per month, pt will work on HEP and continue with IVIG treatment. Will progress HEP appropriately.  -EN     Predicted Duration of Therapy Intervention (OT) 6 months  -EN     Planned Therapy Interventions (Optional Details) home exercise program;patient/family education;strengthening  -EN     OT Plan Comments Continue with plan of care.  -EN           User Key  (r) = Recorded By, (t) = Taken By, (c) = Cosigned By    Initials Name Provider Type    Diana Grimes OTR Occupational Therapist                    9 Hole Peg  9-Hole Peg Left: 18.16  9-Hole Peg Right: 18.94  Quick DASH  Open a tight or new jar.: Moderate Difficulty  Do heavy household chores (e.g., wash walls, wash floors): Severe Difficulty  Carry a shopping bag or briefcase: Moderate Difficulty  Wash your back: Unable  Use a knife to cut food: Moderate Difficulty  Recreational activities in which you take some force or impact through your arm, should or hand (e.g. golf, hammering, tennis, etc.): Unable  During the past week, to what extent has your arm,  shoulder, or hand problem interfered with your normal social activites with family, friends, neighbors or groups?: Moderately  During the past week, were you limited in your work or other regular daily activities as a result of your arm, shoulder or hand problem?: Moderately Limited  Arm, Shoulder, or hand pain: Moderate  Tingling (pins and needles) in your arm, shoulder, or hand: Severe  During the past week, how much difficulty have you had sleeping because of the pain in your arm, shoulder or hand?: Mild Difficulty  Number of Questions Answered: 11  Quick DASH Score: 61.36         Time Calculation:   OT Start Time: 1000  OT Stop Time: 1045  OT Time Calculation (min): 45 min     Therapy Charges for Today     Code Description Service Date Service Provider Modifiers Qty    04828733984  OT RE-EVAL 2 2/21/2023 Diana Richmond OTR GO 1                     JESSY Medina  2/21/2023

## 2023-02-21 NOTE — THERAPY RE-EVALUATION
Outpatient Physical Therapy Neuro Progress Note   [facility]     Patient Name: Delphine King  : 1985  MRN: [id number]  Today's Date: 2023      Visit Date: 2023    Visit Dx:    ICD-10-CM ICD-9-CM   1. MMN (multifocal motor neuropathy) (HCC): Receieved IVIG  G61.82 357.89       Patient Active Problem List   Diagnosis   • Rectovaginal fistula: s/p repair in 2016   • History of pulmonary embolism: lifelong anticoagulation   • MMN (multifocal motor neuropathy) (HCC): Receieved IVIG            PT Neuro     Row Name 23 0900             Subjective Comments    Subjective Comments Pt states that her last infusion didn't seem to do as much as previous ones.  -JV         MMT Right Lower Ext    Rt Hip Flexion MMT, Gross Movement (4-/5) good minus  -JV      Rt Hip Extension MMT, Gross Movement (4-/5) good minus  -JV      Rt Hip ABduction MMT, Gross Movement (4-/5) good minus  -JV      Rt Hip ADduction MMT, Gross Movement (4/5) good  -JV      Rt Knee Extension MMT, Gross Movement (4/5) good  -JV      Rt Ankle Dorsiflexion MMT, Gross Movement (4/5) good  -JV         MMT Left Lower Ext    Lt Hip Flexion MMT, Gross Movement (4-/5) good minus  -JV      Lt Hip Extension MMT, Gross Movement (4-/5) good minus  -JV      Lt Hip ABduction MMT, Gross Movement (4-/5) good minus  -JV      Lt Hip ADduction MMT, Gross Movement (4/5) good  -JV      Lt Knee Extension MMT, Gross Movement (4/5) good  -JV      Lt Ankle Dorsiflexion MMT, Gross Movement (4/5) good  -JV            User Key  (r) = Recorded By, (t) = Taken By, (c) = Cosigned By    Initials Name Provider Type    Cleopatra Glynn PT Physical Therapist                         PT Assessment/Plan     Row Name 23 6735          PT Assessment    Functional Limitations Decreased safety during functional activities;Impaired gait;Limitation in home management;Limitations in community activities;Performance in leisure activities;Performance in self-care ADL   -JV     Impairments Balance;Coordination;Endurance;Gait;Impaired muscle endurance;Motor function;Muscle strength  -JV     Assessment Comments The pt presents for tx and re-assessment this date after IVIG infusion on 2/13. The pt is walking with a cane and reports that she is due to  R LE AFO this week. Pt states that the infusion this month did not seem to be as effective as previous months. The pt was provided with LE stretching exercises and some seated LE strengthening exercises. The pt's score on the LEFS is the same as last admin, although pt rates getting out of the car and doing stairs as more difficult, and sitting/standing for an hour as less difficult. Time on the 5XSST improved from 23.97 to 23.52 sec, time on the TUG improved from 9.77 to 8.64 sec on Trial 1 and 9.55 to 7.98 sec on Trial 2. Oj LE strength improved in ankle DF, but pt remains weaker in the proximal joints. Overall, the pt is demonstrating slow and steady progress. Pt has met STG #2 and LTG's #1, 2, and 3. STG #1 is ongoing as HEP is update, and LTG #4 is progressing.  -JV     Rehab Potential Good  -JV     Patient/caregiver participated in establishment of treatment plan and goals Yes  -JV        PT Plan    PT Plan Comments Assess monthly as needed.  -JV           User Key  (r) = Recorded By, (t) = Taken By, (c) = Cosigned By    Initials Name Provider Type    Cleopatra Glynn, PT Physical Therapist                    OP Exercises     Row Name 02/21/23 0900             Subjective Comments    Subjective Comments Pt states that her last infusion didn't seem to do as much as previous ones.  -JV         Subjective Pain    Able to rate subjective pain? yes  -JV      Pre-Treatment Pain Level 0  -JV      Post-Treatment Pain Level 0  -JV         Exercise 1    Exercise Name 1 Pt was provided with written, verbal and demo instructions for supine HS/gastroc stretch with sheet, frog leg stretch, piriformis stretch and prone hip flexor stretch.   -JV         Exercise 2    Exercise Name 2 Pt was provided with written, verbal and demo instructions for seated LE ther ex including hip ADD/ABD, marching, LAQ's and heel/toe raises.  -FP CompleteV            User Key  (r) = Recorded By, (t) = Taken By, (c) = Cosigned By    Initials Name Provider Type    Cleopatra Glynn, PT Physical Therapist                             PT OP Goals     Row Name 02/21/23 0900          PT Short Term Goals    STG Date to Achieve 12/29/22  -JV     STG 1 Pt will be I with HEP  -JV     STG 1 Progress Ongoing  -JV     STG 2 Admin TUG at next visit.  -JV     STG 2 Progress Met  -JV        Long Term Goals    LTG Date to Achieve 05/01/23  -JV     LTG 1 Pt will improve score on LEFS by 9 pts.  -JV     LTG 1 Progress Met  -JV     LTG 2 Pt will improve score on MILLER by 4 pts.  -JV     LTG 2 Progress Met  -JV     LTG 3 Pt will decrease time on 5XSST by 5 seconds.  -JV     LTG 3 Progress Met  -JV     LTG 4 Pt will increase Oj LE strength to 4/5  -JV     LTG 4 Progress Progressing  -JV        Time Calculation    PT Goal Re-Cert Due Date 03/23/23  -FP CompleteV           User Key  (r) = Recorded By, (t) = Taken By, (c) = Cosigned By    Initials Name Provider Type    Cleopatra Glynn, SARAH Physical Therapist                Therapy Education  Education Details: Added seated LE ther ex and stretching to HEP  Given: HEP  Program: New  How Provided: Verbal, Demonstration, Written  Provided to: Patient  Level of Understanding: Teach back education performed, Verbalized, Demonstrated    Outcome Measure Options: 5x Sit to Stand, Lower Extremity Functional Scale (LEFS), Timed Up and Go (TUG)  5 Times Sit to Stand  5 Times Sit to Stand (seconds): 23.52 seconds  5 Times Sit to Stand Comments: Completed without UE support.  Lower Extremity Functional Index  Any of your usual work, housework or school activities: Moderate difficulty  Your usual hobbies, recreational or sporting activities: Quite a bit of difficulty  Getting into  or out of the bath: Moderate difficulty  Walking between rooms: A little bit of difficulty  Putting on your shoes or socks: Moderate difficulty  Squatting: Quite a bit of difficulty  Lifting an object, like a bag of groceries from the floor: Moderate difficulty  Performing light activities around your home: A little bit of difficulty  Performing heavy activities around your home: Quite a bit of difficulty  Getting into or out of a car: Quite a bit of difficulty  Walking 2 blocks: Moderate difficulty  Walking a mile: Quite a bit of difficulty  Going up or down 10 stairs (about 1 flight of stairs): Quite a bit of difficulty  Standing for 1 hour: A little bit of difficulty  Sitting for 1 hour: A little bit of difficulty  Running on even ground: Extreme difficulty or unable to perform activity  Running on uneven ground: Extreme difficulty or unable to perform activity  Making sharp turns while running fast: Extreme difficulty or unable to perform activity  Hopping: Extreme difficulty or unable to perform activity  Rolling over in bed: Moderate difficulty  Total: 30  Timed Up and Go (TUG)  TUG Test 1: 8.64 seconds  TUG Test 2: 7.98 seconds      Time Calculation:   Start Time: 0915  Stop Time: 1000  Time Calculation (min): 45 min   Therapy Charges for Today     Code Description Service Date Service Provider Modifiers Qty    01685535980 HC PT THER PROC EA 15 MIN 2/21/2023 Cleopatra Hoffmann, PT GP 2    16881782176 HC PT RE-EVAL ESTABLISHED PLAN 2 2/21/2023 Cleopatra Hoffmann, PT GP 1          PT G-Codes  Outcome Measure Options: 5x Sit to Stand, Lower Extremity Functional Scale (LEFS), Timed Up and Go (TUG)  Total: 30  TUG Test 1: 8.64 seconds  TUG Test 2: 7.98 seconds         Cleopatra Hoffmann, PT  2/21/2023

## 2023-03-21 ENCOUNTER — HOSPITAL ENCOUNTER (OUTPATIENT)
Dept: PHYSICAL THERAPY | Facility: HOSPITAL | Age: 38
Setting detail: THERAPIES SERIES
Discharge: HOME | End: 2023-03-21
Payer: COMMERCIAL

## 2023-03-21 ENCOUNTER — HOSPITAL ENCOUNTER (OUTPATIENT)
Dept: OCCUPATIONAL THERAPY | Facility: HOSPITAL | Age: 38
Setting detail: THERAPIES SERIES
Discharge: HOME | End: 2023-03-21
Payer: COMMERCIAL

## 2023-03-21 DIAGNOSIS — G61.82 MMN (MULTIFOCAL MOTOR NEUROPATHY): ICD-10-CM

## 2023-03-21 DIAGNOSIS — G61.82 MULTIFOCAL MOTOR NEUROPATHY: Primary | ICD-10-CM

## 2023-03-21 DIAGNOSIS — G61.82 MMN (MULTIFOCAL MOTOR NEUROPATHY): Primary | ICD-10-CM

## 2023-03-21 PROCEDURE — 97161 PT EVAL LOW COMPLEX 20 MIN: CPT

## 2023-03-21 PROCEDURE — 97168 OT RE-EVAL EST PLAN CARE: CPT

## 2023-03-21 PROCEDURE — 97110 THERAPEUTIC EXERCISES: CPT

## 2023-03-21 NOTE — THERAPY RE-EVALUATION
.Outpatient Physical Therapy Neuro Re-Evaluation   [place]     Patient Name: Delphine King  : 1985  MRN: [id number]  Today's Date: 3/21/2023      Visit Date: 2023    Patient Active Problem List   Diagnosis   • Rectovaginal fistula: s/p repair in 2016   • History of pulmonary embolism: lifelong anticoagulation   • MMN (multifocal motor neuropathy) (HCC): Receieved IVIG        Past Medical History:   Diagnosis Date   • Anxiety    • Depression    • MMN (multifocal motor neuropathy) (HCC)         Past Surgical History:   Procedure Laterality Date   • ANAL FISTULA REPAIR     • HYSTERECTOMY     • TONSILLECTOMY           Visit Dx:     ICD-10-CM ICD-9-CM   1. MMN (multifocal motor neuropathy) (HCC): Receieved IVIG  G61.82 357.89                PT Neuro     Row Name 23 0930             MMT Right Lower Ext    Rt Hip Flexion MMT, Gross Movement (4-/5) good minus  -JV      Rt Hip Extension MMT, Gross Movement (4-/5) good minus  -JV      Rt Hip ABduction MMT, Gross Movement (4-/5) good minus  -JV      Rt Hip ADduction MMT, Gross Movement (4/5) good  -JV      Rt Knee Extension MMT, Gross Movement (4/5) good  -JV      Rt Ankle Dorsiflexion MMT, Gross Movement (4/5) good  -JV         MMT Left Lower Ext    Lt Hip Flexion MMT, Gross Movement (4-/5) good minus  -JV      Lt Hip Extension MMT, Gross Movement (4-/5) good minus  -JV      Lt Hip ABduction MMT, Gross Movement (4-/5) good minus  -JV      Lt Hip ADduction MMT, Gross Movement (4/5) good  -JV      Lt Knee Extension MMT, Gross Movement (4/5) good  -JV      Lt Ankle Dorsiflexion MMT, Gross Movement (4/5) good  -JV            User Key  (r) = Recorded By, (t) = Taken By, (c) = Cosigned By    Initials Name Provider Type    Cleopatra Glynn PT Physical Therapist                        Therapy Education  Education Details: Provided green T'band to progress seated LE ther ex, add standing hip ABD, standing gastroc stretch, and seated HS and piriformis  stretch.  Given: HEP  Program: New  How Provided: Verbal, Demonstration, Written  Provided to: Patient  Level of Understanding: Teach back education performed, Verbalized, Demonstrated     PT OP Goals     Row Name 03/21/23 0930          PT Short Term Goals    STG Date to Achieve 04/20/23  -JV     STG 1 Pt will be I with HEP  -JV     STG 1 Progress Ongoing  -JV     STG 2 Admin TUG at next visit.  -JV     STG 2 Progress Met  -JV     STG 3 Pt will improve 5XSST time to < 16.5 sec.  -JV     STG 3 Progress New  -JV        Long Term Goals    LTG Date to Achieve 05/01/23  -JV     LTG 1 Pt will improve score on LEFS by 9 pts.  -JV     LTG 1 Progress Met  -JV     LTG 2 Pt will improve score on MILLER by 4 pts.  -JV     LTG 2 Progress Met  -JV     LTG 3 Pt will decrease time on 5XSST by 5 seconds.  -JV     LTG 3 Progress Met  -JV     LTG 4 Pt will increase Oj LE strength to 4/5  -JV     LTG 4 Progress Progressing;Partially Met  -JV        Time Calculation    PT Goal Re-Cert Due Date 04/20/23  -JV           User Key  (r) = Recorded By, (t) = Taken By, (c) = Cosigned By    Initials Name Provider Type    Cleopatra Glynn PT Physical Therapist                 PT Assessment/Plan     Row Name 03/21/23 1531          PT Assessment    Functional Limitations Decreased safety during functional activities;Impaired gait;Limitation in home management;Limitations in community activities;Performance in leisure activities;Performance in self-care ADL  -JV     Impairments Balance;Coordination;Endurance;Gait;Impaired muscle endurance;Motor function;Muscle strength  -JV     Assessment Comments The pt presents for tx and re-assessment this date after IVIG infusion 3/13. The pt reports that she received custom AFO, but cannot drive with it due to PF block. The pt was provied with green T'band to progress resistance with seated LE ther ex. Standing LE ABD was addedd to HEP, and pt was provided with alternative positions for stretching including  seated HS stretch, seated piriformis stretch and standing gastroc stretch. Pt verbalizes that L HS/gastroc feel tighter than the R LE. Pt has difficulty maintaining neutral hip position in SLS due to muscle weakness/fatigue. The pt's performance on all outcome measures was improved compared to last assessent. Time on the 5XSST improved from 23.52 to 19.47 sec, time on the TUG improved from 8.64 to 7.35 sec, and LEFS score improved from 30 to 34 points. The pt continues to make progress and has met STG #2 and LTG's #1-3. STG #1 is ongoing and STG #3 is new. LTG #4 is progressing and considered partially met.  -JV     Rehab Potential Good  -JV     Patient/caregiver participated in establishment of treatment plan and goals Yes  -JV     Patient would benefit from skilled therapy intervention Yes  -JV        PT Plan    PT Plan Comments Assess monthly as needed, progress HEP as vishal.  -JV           User Key  (r) = Recorded By, (t) = Taken By, (c) = Cosigned By    Initials Name Provider Type    Cleopatra Glynn, PT Physical Therapist                    OP Exercises     Row Name 03/21/23 0930             Precautions    Existing Precautions/Restrictions fall  -JV         Subjective Comments    Subjective Comments Pt reports she got her new custom AFO but cannot drive with it due to PF block.  -JV         Subjective Pain    Able to rate subjective pain? yes  -JV      Pre-Treatment Pain Level 0  -JV      Post-Treatment Pain Level 0  -JV         Exercise 1    Exercise Name 1 Pt was tested with TUG, 5XSST, and LEFS questionnaire. All of the obective measures demonstrate improvement.  -JV         Exercise 2    Exercise Name 2 Pt was provided with green T'band to progress seated LE execises.  -JV         Exercise 3    Exercise Name 3 Pt performed seated HS and piriformis stretch.  -JV      Cueing 3 Verbal;Demo  -JV      Sets 3 1  -JV      Reps 3 3  -JV         Exercise 4    Exercise Name 4 Pt performed standing gastroc stretch.   -JV      Cueing 4 Verbal;Demo  -JV      Sets 4 1  -JV      Reps 4 3  -JV      Additional Comments Pt verbalizes that R gastroc and HS are tighter than the L LE.  -JV         Exercise 5    Exercise Name 5 Pt performed standing hip ABD  -JV      Cueing 5 Verbal;Tactile;Demo  -JV      Sets 5 1  -JV      Reps 5 10  -JV            User Key  (r) = Recorded By, (t) = Taken By, (c) = Cosigned By    Initials Name Provider Type    Cleopatra Glynn PT Physical Therapist                            Outcome Measure Options: 5x Sit to Stand, Lower Extremity Functional Scale (LEFS), Timed Up and Go (TUG)  5 Times Sit to Stand  5 Times Sit to Stand (seconds): 19.47 seconds  5 Times Sit to Stand Comments: Completed without UE support.  Lower Extremity Functional Index  Any of your usual work, housework or school activities: A little bit of difficulty  Your usual hobbies, recreational or sporting activities: A little bit of difficulty  Getting into or out of the bath: A little bit of difficulty  Walking between rooms: A little bit of difficulty  Putting on your shoes or socks: Moderate difficulty  Squatting: Quite a bit of difficulty  Lifting an object, like a bag of groceries from the floor: Moderate difficulty  Performing light activities around your home: A little bit of difficulty  Performing heavy activities around your home: Quite a bit of difficulty  Getting into or out of a car: Moderate difficulty  Walking 2 blocks: Quite a bit of difficulty  Walking a mile: Quite a bit of difficulty  Going up or down 10 stairs (about 1 flight of stairs): Moderate difficulty  Standing for 1 hour: Moderate difficulty  Sitting for 1 hour: Moderate difficulty  Running on even ground: Extreme difficulty or unable to perform activity  Running on uneven ground: Extreme difficulty or unable to perform activity  Making sharp turns while running fast: Extreme difficulty or unable to perform activity  Hopping: Extreme difficulty or unable to perform  activity  Rolling over in bed: A little bit of difficulty  Total: 34  Timed Up and Go (TUG)  TUG Test 1: 7.35 seconds  TUG Test 2: 7.7 seconds    Time Calculation:   Start Time: 0900  Stop Time: 1000  Time Calculation (min): 60 min   Therapy Charges for Today     Code Description Service Date Service Provider Modifiers Qty    70442565583 HC PT EVAL LOW COMPLEXITY 3 3/21/2023 Cleopatra Hoffmann, PT GP 1    [id number]  PT THER PROC EA 15 MIN 3/21/2023 Cleopatra Hoffmann, PT GP 1          PT G-Codes  Outcome Measure Options: 5x Sit to Stand, Lower Extremity Functional Scale (LEFS), Timed Up and Go (TUG)  Total: 34  TUG Test 1: 7.35 seconds  TUG Test 2: 7.7 seconds         Cleopatra Hoffmann, PT  3/21/2023

## 2023-03-21 NOTE — THERAPY RE-EVALUATION
" Outpatient Occupational Therapy Neuro Re-Evaluation   [place]     Patient Name: Delphine King  : 1985  MRN: [id number]  Today's Date: 3/21/2023      Visit Date: 2023    Patient Active Problem List   Diagnosis   • Rectovaginal fistula: s/p repair in 2016   • History of pulmonary embolism: lifelong anticoagulation   • MMN (multifocal motor neuropathy) (HCC): Receieved IVIG        Past Medical History:   Diagnosis Date   • Anxiety    • Depression    • MMN (multifocal motor neuropathy) (HCC)         Past Surgical History:   Procedure Laterality Date   • ANAL FISTULA REPAIR     • HYSTERECTOMY     • TONSILLECTOMY           Visit Dx:      ICD-10-CM ICD-9-CM   1. Multifocal motor neuropathy (HCC)  G61.82 357.89   2. MMN (multifocal motor neuropathy) (HCC): Receieved IVIG  G61.82 357.89            OT Neuro     Row Name 23 1000             Subjective Comments    Subjective Comments Patient reports last IVIG treatment was last week (,  and ). Patient reports she doesn't feel it was as effective this time. Patient states her right hand feels fatigued today. \"I used my hand a lot yesterday... I carried a lot of groceries.\"  -EN         Subjective Pain    Subjective Pain Comment Reports no pain. Patient does reports she continues to occasionally have \"cecilia horses and fasiculations.\"  -EN         Sensation    Additional Comments reports no numbness, occasional tingling in hands and feet  -EN         Coordination    9-Hole Peg Left 18.39  -EN      9-Hole Peg Right 21.10  -EN         Right Upper Ext    Rt Shoulder Abduction AROM WFL  -EN      Rt Shoulder Flexion AROM WFL  -EN      Rt Elbow Supination AROM WFL  -EN      Rt Elbow Pronation AROM WFL  -EN      Rt Wrist Flexion AROM WFL  -EN      Rt Wrist Extension AROM able to extend with effort, continues to have limited finger extension in digits2-5 (pt states she has had these limitations for 10 years)  -EN         Left Upper Ext    Lt " Shoulder Abduction AROM WFL  -EN      Lt Shoulder Flexion AROM WFL  -EN      Lt Elbow Extension/Flexion AROM WFL  -EN      Lt Elbow Supination AROM WFL  -EN      Lt Elbow Pronation AROM WFL  -EN      Lt Wrist Flexion AROM WFL  -EN      Lt Wrist Extension AROM WFL  -EN         MMT Right Upper Ext    Rt Shoulder Flexion MMT, Gross Movement (4/5) good  -EN      Rt Shoulder Extension MMT, Gross Movement (4+/5) good plus  -EN      Rt Shoulder ABduction MMT, Gross Movement (4/5) good  -EN      Rt Shoulder ADduction MMT, Gross Movement (4+/5) good plus  -EN      Rt Elbow Flexion MMT, Gross Movement: (5/5) normal  -EN      Rt Elbow Extension MMT, Gross Movement: (5/5) normal  -EN      Rt Forearm Supination MMT, Gross Movement (4+/5) good plus  -EN      Rt Forearm Pronation MMT, Gross Movement (4+/5) good plus  -EN         MMT Left Upper Ext    Lt Shoulder Flexion MMT, Gross Movement (5/5) normal  -EN      Lt Shoulder Extension MMT, Gross Movement (5/5) normal  -EN      Lt Shoulder ABduction MMT, Gross Movement (5/5) normal  -EN      Lt Shoulder ADduction MMT, Gross Movement (5/5) normal  -EN      Lt Elbow Flexion MMT, Gross Movement (5/5) normal  -EN      Lt Elbow Extension MMT, Gross Movement (5/5) normal  -EN      Lt Forearm Supination MMT, Gross Movement (5/5) normal  -EN      Lt Forearm Pronation MMT, Gross Movement (5/5) normal  -EN      Lt Wrist Flexion MMT, Gross Movement (5/5) normal  -EN      Lt Wrist Extension MMT, Gross Movement (5/5) normal  -EN            User Key  (r) = Recorded By, (t) = Taken By, (c) = Cosigned By    Initials Name Provider Type    Diana Grimes OTR Occupational Therapist                Hand Therapy (last 24 hours)     Hand Eval     Row Name 03/21/23 1000             Right Extension AROM    II- MP AROM 0  -EN      II- PIP AROM 0  -EN      II- DIP AROM 0  -EN      II- FREDERICK Right Extension AROM 0  -EN      III- MP AROM -64  -EN      III- PIP AROM -35  -EN      IV- MP AROM 55  -EN       IV- PIP AROM 65  -EN      V- MP AROM -65  -EN      V- PIP AROM -7  -EN         Right Thumb AROM    MP Flexion - AROM 52  -EN      IP Flexion - AROM 68  -EN          Strength Right    # Reps 3  -EN      Right Rung 2  -EN      Right  Test 1 15  -EN      Right  Test 2 20  -EN      Right  Test 3 25  -EN       Strength Average Right 20  -EN          Strength Left    # Reps 3  -EN      Left Rung 2  -EN      Left  Test 1 44  -EN      Left  Test 2 36  -EN      Left  Test 3 45  -EN       Strength Average Left 41.67  -EN         Right Hand Strength - Pinch (lbs)    Lateral 7 lbs  (7, 7, 7)  -EN      Tip (3 point) 8.66 lbs  8,9,8  -EN         Left Hand Strength - Pinch (lbs)    Lateral 11.66 lbs  11,12,12  -EN      Tip (3 point) 14.66 lbs  15.13,16  -EN         Therapy Education    Education Details Added red theraband shoulder/elbow strengthening to HEP  -EN      Given HEP  -EN      Program New;Reinforced  -EN      How Provided Verbal;Demonstration;Written  -EN      Provided to Patient  -EN      Level of Understanding Teach back education performed;Verbalized;Demonstrated  -EN            User Key  (r) = Recorded By, (t) = Taken By, (c) = Cosigned By    Initials Name Provider Type    Diana Grimes OTR Occupational Therapist               OT Ortho     Row Name 03/21/23 1000             MMT Right Upper Ext    Rt Wrist Flexion MMT, Gross Movement (3+/5) fair plus  -EN      Rt Wrist Extension MMT, Gross Movement (4-/5) good minus  -EN            User Key  (r) = Recorded By, (t) = Taken By, (c) = Cosigned By    Initials Name Provider Type    Diana Grimes OTR Occupational Therapist                  Therapy Education  Education Details: Added red theraband shoulder/elbow strengthening to HEP  Given: HEP  Program: New, Reinforced  How Provided: Verbal, Demonstration, Written  Provided to: Patient  Level of Understanding: Teach back education performed, Verbalized,  "Demonstrated     OT Goals     Row Name 03/21/23 1100          OT Short Term Goals    STG Date to Achieve 04/18/23  -EN     STG 1 Patient will demonstrate improved right  strength by 3# for improved grasp of objects. (Currently 25# pounds)  -EN     STG 1 Progress Goal Revised;Ongoing  -EN     STG 1 Progress Comments today 3/21/23 was 20 pounds, will continue with goal for consistency  -EN     STG 2 Patient will report modified independence with ADL/IADL routine (compensatory strategies, adaptive equipment, etc..)  -EN     STG 2 Progress Met  -EN        Long Term Goals    LTG Date to Achieve 07/11/23  -EN     LTG 1 Patient will demonstrate improved fine motor coordination evidenced by a 5 second improved 9 hole peg score (right hand)  -EN     LTG 1 Progress Met  -EN     LTG 2 Patient will demonstrate improved right hand  strength by 20 pounds for improved grasp of objects.  -EN     LTG 2 Progress Met;Goal Revised  -EN     LTG 2 Progress Comments goal was met 2/21/23, Goal revised today to improve 10 pounds (currently 20 pounds)  -EN     LTG 3 Patient will improve Quick Dash score 50 points.  -EN     LTG 3 Progress Progressing;Ongoing  -EN     LTG 3 Progress Comments Has improved 31.81 points (3/21/23)  -EN           User Key  (r) = Recorded By, (t) = Taken By, (c) = Cosigned By    Initials Name Provider Type    Diana Grimes OTR Occupational Therapist                        OT Assessment/Plan     Row Name 03/21/23 1242          OT Assessment    Functional Limitations Limitation in home management;Limitations in community activities;Performance in leisure activities;Performance in self-care ADL  -EN     Impairments Coordination;Dexterity;Muscle strength;Pain;Range of motion  -EN     Assessment Comments Patient reports her right hand is \"fatigued today...I used it a lot yesterday.\" Patient reports she continues to use adaptive equipment and compensatory strategies and is doing well with ADL/IADLs. " Patient states she continues to have some difficulty with cutting food but is using the rocker knife.Patient's progress and monthly comparisons as follows: Right  on 11/1/22 was 2 pounds, 11/29 was 16 pounds, 12/28 was 18 pounds, 2/21/23 was 25.33 pounds and today was 20 (down 5 pounds since last month). Patient's right pinch as follows: Right lateral pinch 11/1/22 was 3 pounds, 11/29 was 7.3 pounds, 12/28 was 7 pounds, 2/21/23 was 6 pounds and today was 7 pounds (up one pound since last month). Right 3 point pinch as follows:  11/1 was 2 pounds, 11/29 was 7.3 pounds, 12/28 was 11/33 pounds, 2/21/23 was 8 pounds and today was 8.66 pounds (increased .66 pounds since last month). Right 9 hole peg scores as follows:  11/1 was 30.56 seconds, 11/29 was 18.78 seconds, 12/28 was 19.33 seconds, 2/21/23 was 18.94 seconds and today was 21.10 seconds (increased 2 seconds since last month). Left  strength as follows:   11/1/22 was 22.32 pounds, 11/29 was 32 pounds, 12/28 was 52.3 pounds, 2/21 was 56 pounds, and today was 41.66 pounds (decreased 14.3 pounds since last month). Left lateral pinch on 11/1/22 was 7.3 pounds, 11/29 was 9.6 pounds, 12/28 was 11.3 pounds, 2/21/23 was 13.3 pounds and today was 11.6 pounds (decreased  1.7 pounds since last month).  Left 3 point pinch as follows: 11/1 was 8 pounds, 11/29 was 12 pounds, 12/28 was 14.33 pounds, 2/21/23 was 16.3 pounds and today was 14.66 pounds (decreased 1.6 pounds since last month).  Left hand 9 Hole Peg scores as follows: 11/1 was 22.75 seconds, 11/29 was 19.42 seconds, 12/28 was 18.95 seconds, 2/21/23 was 18.16 seconds, and today was 18.39 seconds.  B UE AROM was WFL limits and B UE strength was grossly 4+/5 (with exception of the right wrist ). Quick Dash score was significantly better than last month (improved 22.7 points). Patient reports IVIG continues to make significant improvement in functional status. Patient is doing well with ADLs and continues to work  hard on home exercises. New UE exercises with red theraband added to HEP.  -EN        OT Plan    OT Frequency Other (comment)  One time per month. Patient will continue to work on HEP and receive monthly IVIG treatments. Will continue to update HEP.  -EN     Predicted Duration of Therapy Intervention (OT) 6 months  -EN     Planned CPT's? OT EVAL LOW COMPLEXITY: 15712;OT THER ACT EA 15 MIN: [id number]  -EN     Planned Therapy Interventions (Optional Details) home exercise program;patient/family education;strengthening  -EN     OT Plan Comments Continue with plan of care.  -EN           User Key  (r) = Recorded By, (t) = Taken By, (c) = Cosigned By    Initials Name Provider Type    Diana Grimes OTPATRICIA Occupational Therapist               OT Exercises     Row Name 03/21/23 1000             Exercise 1    Exercise Name 1 review of HEP, red theraband exercises added to HEP (see paper chart)  -EN         Exercise 2    Exercise Name 2 review of adaptive equipment and compensatory strategies for ind with ADL/IADLs.  -EN            User Key  (r) = Recorded By, (t) = Taken By, (c) = Cosigned By    Initials Name Provider Type    Diana Grimes OTR Occupational Therapist                  9 Hole Peg  9-Hole Peg Left: 18.39  9-Hole Peg Right: 21.10  Quick DASH  Open a tight or new jar.: Mild Difficulty  Do heavy household chores (e.g., wash walls, wash floors): Moderate Difficulty  Carry a shopping bag or briefcase: Mild Difficulty  Wash your back: Unable  Use a knife to cut food: Mild Difficulty  Recreational activities in which you take some force or impact through your arm, should or hand (e.g. golf, hammering, tennis, etc.): Unable  During the past week, to what extent has your arm, shoulder, or hand problem interfered with your normal social activites with family, friends, neighbors or groups?: Slightly  During the past week, were you limited in your work or other regular daily activities as a result of your  arm, shoulder or hand problem?: Slightly Limited  Arm, Shoulder, or hand pain: Mild  Tingling (pins and needles) in your arm, shoulder, or hand: Mild  During the past week, how much difficulty have you had sleeping because of the pain in your arm, shoulder or hand?: No difficulty  Number of Questions Answered: 11  Quick DASH Score: 38.64         Time Calculation:   OT Start Time: 1000  OT Stop Time: 1040  OT Time Calculation (min): 40 min     Therapy Charges for Today     Code Description Service Date Service Provider Modifiers Qty    [id number]  OT RE-EVAL 2 3/21/2023 Diana Richmond OTR GO 1                     JESSY Medina  3/21/2023

## 2023-04-11 ENCOUNTER — HOSPITAL ENCOUNTER (OUTPATIENT)
Facility: HOSPITAL | Age: 38
Discharge: HOME | End: 2023-04-12
Attending: EMERGENCY MEDICINE | Admitting: SURGERY
Payer: COMMERCIAL

## 2023-04-11 DIAGNOSIS — K81.0 ACUTE CHOLECYSTITIS: Primary | ICD-10-CM

## 2023-04-11 LAB
BASOPHILS # BLD AUTO: 0.08 10*3/MM3 (ref 0–0.2)
BASOPHILS NFR BLD AUTO: 1.2 % (ref 0–1.5)
BILIRUB UR QL STRIP: ABNORMAL
CLARITY UR: CLEAR
COLOR UR: YELLOW
DEPRECATED RDW RBC AUTO: 44.7 FL (ref 37–54)
EOSINOPHIL # BLD AUTO: 0.38 10*3/MM3 (ref 0–0.4)
EOSINOPHIL NFR BLD AUTO: 5.5 % (ref 0.3–6.2)
ERYTHROCYTE [DISTWIDTH] IN BLOOD BY AUTOMATED COUNT: 13.1 % (ref 12.3–15.4)
GLUCOSE UR STRIP-MCNC: NEGATIVE MG/DL
HCT VFR BLD AUTO: 40.6 % (ref 34–46.6)
HGB BLD-MCNC: 13.3 G/DL (ref 12–15.9)
HGB UR QL STRIP.AUTO: ABNORMAL
IMM GRANULOCYTES # BLD AUTO: 0.03 10*3/MM3 (ref 0–0.05)
IMM GRANULOCYTES NFR BLD AUTO: 0.4 % (ref 0–0.5)
KETONES UR QL STRIP: NEGATIVE
LEUKOCYTE ESTERASE UR QL STRIP.AUTO: NEGATIVE
LYMPHOCYTES # BLD AUTO: 2.4 10*3/MM3 (ref 0.7–3.1)
LYMPHOCYTES NFR BLD AUTO: 34.7 % (ref 19.6–45.3)
MCH RBC QN AUTO: 30.9 PG (ref 26.6–33)
MCHC RBC AUTO-ENTMCNC: 32.8 G/DL (ref 31.5–35.7)
MCV RBC AUTO: 94.4 FL (ref 79–97)
MONOCYTES # BLD AUTO: 0.58 10*3/MM3 (ref 0.1–0.9)
MONOCYTES NFR BLD AUTO: 8.4 % (ref 5–12)
NEUTROPHILS NFR BLD AUTO: 3.45 10*3/MM3 (ref 1.7–7)
NEUTROPHILS NFR BLD AUTO: 49.8 % (ref 42.7–76)
NITRITE UR QL STRIP: NEGATIVE
NRBC BLD AUTO-RTO: 0 /100 WBC (ref 0–0.2)
PH UR STRIP.AUTO: 5.5 [PH] (ref 4.5–8)
PLATELET # BLD AUTO: 221 10*3/MM3 (ref 140–450)
PMV BLD AUTO: 11 FL (ref 6–12)
PROT UR QL STRIP: NEGATIVE
RBC # BLD AUTO: 4.3 10*6/MM3 (ref 3.77–5.28)
SP GR UR STRIP: 1.01 (ref 1–1.03)
UROBILINOGEN UR QL STRIP: ABNORMAL
WBC NRBC COR # BLD: 6.92 10*3/MM3 (ref 3.4–10.8)

## 2023-04-11 PROCEDURE — 81001 URINALYSIS AUTO W/SCOPE: CPT | Performed by: EMERGENCY MEDICINE

## 2023-04-11 PROCEDURE — 99284 EMERGENCY DEPT VISIT MOD MDM: CPT

## 2023-04-11 PROCEDURE — 85025 COMPLETE CBC W/AUTO DIFF WBC: CPT | Performed by: EMERGENCY MEDICINE

## 2023-04-11 PROCEDURE — 80053 COMPREHEN METABOLIC PANEL: CPT | Performed by: EMERGENCY MEDICINE

## 2023-04-11 PROCEDURE — 36415 COLL VENOUS BLD VENIPUNCTURE: CPT

## 2023-04-11 PROCEDURE — 83690 ASSAY OF LIPASE: CPT | Performed by: EMERGENCY MEDICINE

## 2023-04-11 RX ORDER — ALBUTEROL SULFATE 90 UG/1
2 AEROSOL, METERED RESPIRATORY (INHALATION) EVERY 6 HOURS PRN
COMMUNITY

## 2023-04-11 RX ORDER — PANTOPRAZOLE SODIUM 40 MG/1
40 TABLET, DELAYED RELEASE ORAL DAILY
COMMUNITY

## 2023-04-11 RX ORDER — ONDANSETRON 4 MG/1
4 TABLET, FILM COATED ORAL EVERY 8 HOURS PRN
COMMUNITY

## 2023-04-11 RX ORDER — HYDROXYZINE HYDROCHLORIDE 25 MG/1
25 TABLET, FILM COATED ORAL NIGHTLY PRN
COMMUNITY

## 2023-04-11 RX ORDER — SOLIFENACIN SUCCINATE 10 MG/1
10 TABLET, FILM COATED ORAL DAILY
COMMUNITY

## 2023-04-11 RX ORDER — ATORVASTATIN CALCIUM 20 MG/1
20 TABLET, FILM COATED ORAL NIGHTLY
COMMUNITY

## 2023-04-11 RX ORDER — MULTIPLE VITAMINS W/ MINERALS TAB 9MG-400MCG
1 TAB ORAL DAILY
COMMUNITY

## 2023-04-11 RX ORDER — BUPROPION HYDROCHLORIDE 300 MG/1
300 TABLET ORAL DAILY
COMMUNITY

## 2023-04-11 RX ADMIN — SODIUM CHLORIDE, POTASSIUM CHLORIDE, SODIUM LACTATE AND CALCIUM CHLORIDE 1000 ML: 600; 310; 30; 20 INJECTION, SOLUTION INTRAVENOUS at 23:43

## 2023-04-12 ENCOUNTER — ANESTHESIA EVENT (OUTPATIENT)
Dept: PERIOP | Facility: HOSPITAL | Age: 38
End: 2023-04-12
Payer: COMMERCIAL

## 2023-04-12 ENCOUNTER — ANESTHESIA (OUTPATIENT)
Dept: PERIOP | Facility: HOSPITAL | Age: 38
End: 2023-04-12
Payer: COMMERCIAL

## 2023-04-12 ENCOUNTER — APPOINTMENT (OUTPATIENT)
Dept: CT IMAGING | Facility: HOSPITAL | Age: 38
End: 2023-04-12
Payer: COMMERCIAL

## 2023-04-12 ENCOUNTER — HOSPITAL ENCOUNTER (INPATIENT)
Facility: HOSPITAL | Age: 38
LOS: 2 days | Discharge: HOME | DRG: 446 | End: 2023-04-14
Attending: SURGERY | Admitting: SURGERY
Payer: COMMERCIAL

## 2023-04-12 ENCOUNTER — APPOINTMENT (OUTPATIENT)
Dept: GENERAL RADIOLOGY | Facility: HOSPITAL | Age: 38
End: 2023-04-12
Payer: COMMERCIAL

## 2023-04-12 VITALS
HEART RATE: 55 BPM | SYSTOLIC BLOOD PRESSURE: 98 MMHG | WEIGHT: 252 LBS | HEIGHT: 67 IN | RESPIRATION RATE: 18 BRPM | DIASTOLIC BLOOD PRESSURE: 56 MMHG | TEMPERATURE: 97.6 F | OXYGEN SATURATION: 94 % | BODY MASS INDEX: 39.55 KG/M2

## 2023-04-12 DIAGNOSIS — K80.44 CHOLEDOCHOLITHIASIS WITH CHRONIC CHOLECYSTITIS: Primary | ICD-10-CM

## 2023-04-12 PROBLEM — K81.0 ACUTE CHOLECYSTITIS: Status: ACTIVE | Noted: 2023-04-12

## 2023-04-12 LAB
ALBUMIN SERPL-MCNC: 3.6 G/DL (ref 3.5–5.2)
ALBUMIN/GLOB SERPL: 0.8 G/DL
ALP SERPL-CCNC: 292 U/L (ref 39–117)
ALT SERPL W P-5'-P-CCNC: 750 U/L (ref 1–33)
ANION GAP SERPL CALCULATED.3IONS-SCNC: 9 MMOL/L (ref 5–15)
AST SERPL-CCNC: 593 U/L (ref 1–32)
BACTERIA UR QL AUTO: ABNORMAL /HPF
BILIRUB SERPL-MCNC: 1.6 MG/DL (ref 0–1.2)
BUN SERPL-MCNC: 8 MG/DL (ref 6–20)
BUN/CREAT SERPL: 10.5 (ref 7–25)
CALCIUM SPEC-SCNC: 8.6 MG/DL (ref 8.6–10.5)
CHLORIDE SERPL-SCNC: 103 MMOL/L (ref 98–107)
CO2 SERPL-SCNC: 23 MMOL/L (ref 22–29)
CREAT SERPL-MCNC: 0.76 MG/DL (ref 0.57–1)
EGFRCR SERPLBLD CKD-EPI 2021: 103.6 ML/MIN/1.73
GLOBULIN UR ELPH-MCNC: 4.8 GM/DL
GLUCOSE SERPL-MCNC: 115 MG/DL (ref 65–99)
HYALINE CASTS UR QL AUTO: ABNORMAL /LPF
LIPASE SERPL-CCNC: 60 U/L (ref 13–60)
POTASSIUM SERPL-SCNC: 3.7 MMOL/L (ref 3.5–5.2)
PROT SERPL-MCNC: 8.4 G/DL (ref 6–8.5)
RBC # UR STRIP: ABNORMAL /HPF
REF LAB TEST METHOD: ABNORMAL
SODIUM SERPL-SCNC: 135 MMOL/L (ref 136–145)
SQUAMOUS #/AREA URNS HPF: ABNORMAL /HPF
WBC # UR STRIP: ABNORMAL /HPF

## 2023-04-12 PROCEDURE — G0378 HOSPITAL OBSERVATION PER HR: HCPCS

## 2023-04-12 PROCEDURE — 25010000002 MIDAZOLAM PER 1MG: Performed by: NURSE ANESTHETIST, CERTIFIED REGISTERED

## 2023-04-12 PROCEDURE — 25510000001 IOPAMIDOL 61 % SOLUTION: Performed by: SURGERY

## 2023-04-12 PROCEDURE — 25010000002 KETOROLAC TROMETHAMINE PER 15 MG: Performed by: REGISTERED NURSE

## 2023-04-12 PROCEDURE — 25010000002 PROPOFOL 200 MG/20ML EMULSION: Performed by: NURSE ANESTHETIST, CERTIFIED REGISTERED

## 2023-04-12 PROCEDURE — 25010000002 ONDANSETRON PER 1 MG: Performed by: NURSE ANESTHETIST, CERTIFIED REGISTERED

## 2023-04-12 PROCEDURE — 25010000002 NEOSTIGMINE 10 MG/10ML SOLUTION: Performed by: REGISTERED NURSE

## 2023-04-12 PROCEDURE — 25010000002 LEVOFLOXACIN PER 250 MG: Performed by: SURGERY

## 2023-04-12 PROCEDURE — C1887 CATHETER, GUIDING: HCPCS | Performed by: SURGERY

## 2023-04-12 PROCEDURE — 25010000002 HYDROMORPHONE 1 MG/ML SOLUTION: Performed by: NURSE ANESTHETIST, CERTIFIED REGISTERED

## 2023-04-12 PROCEDURE — 74177 CT ABD & PELVIS W/CONTRAST: CPT

## 2023-04-12 PROCEDURE — 25010000002 CEFAZOLIN PER 500 MG: Performed by: SURGERY

## 2023-04-12 PROCEDURE — 96361 HYDRATE IV INFUSION ADD-ON: CPT

## 2023-04-12 PROCEDURE — 25010000002 DEXAMETHASONE PER 1 MG: Performed by: NURSE ANESTHETIST, CERTIFIED REGISTERED

## 2023-04-12 PROCEDURE — 74300 X-RAY BILE DUCTS/PANCREAS: CPT

## 2023-04-12 PROCEDURE — 47563 LAPARO CHOLECYSTECTOMY/GRAPH: CPT | Performed by: SPECIALIST/TECHNOLOGIST, OTHER

## 2023-04-12 PROCEDURE — 99235 HOSP IP/OBS SAME DATE MOD 70: CPT | Performed by: SURGERY

## 2023-04-12 PROCEDURE — 88304 TISSUE EXAM BY PATHOLOGIST: CPT | Performed by: SURGERY

## 2023-04-12 PROCEDURE — 96360 HYDRATION IV INFUSION INIT: CPT

## 2023-04-12 PROCEDURE — 25510000001 IOPAMIDOL PER 1 ML: Performed by: EMERGENCY MEDICINE

## 2023-04-12 PROCEDURE — 25010000002 HYDROMORPHONE PER 4 MG: Performed by: SURGERY

## 2023-04-12 PROCEDURE — 25010000002 HYDROMORPHONE 1 MG/ML SOLUTION: Performed by: SURGERY

## 2023-04-12 PROCEDURE — 25010000002 FENTANYL CITRATE (PF) 50 MCG/ML SOLUTION: Performed by: REGISTERED NURSE

## 2023-04-12 PROCEDURE — 47563 LAPARO CHOLECYSTECTOMY/GRAPH: CPT | Performed by: SURGERY

## 2023-04-12 DEVICE — LIGAMAX 5 MM ENDOSCOPIC MULTIPLE CLIP APPLIER
Type: IMPLANTABLE DEVICE | Site: ABDOMEN | Status: FUNCTIONAL
Brand: LIGAMAX

## 2023-04-12 DEVICE — FLOSEAL HEMOSTATIC MATRIX, 5ML
Type: IMPLANTABLE DEVICE | Site: ABDOMEN | Status: FUNCTIONAL
Brand: FLOSEAL HEMOSTATIC MATRIX

## 2023-04-12 RX ORDER — SODIUM CHLORIDE 9 MG/ML
100 INJECTION, SOLUTION INTRAVENOUS CONTINUOUS
Status: DISCONTINUED | OUTPATIENT
Start: 2023-04-12 | End: 2023-04-14 | Stop reason: HOSPADM

## 2023-04-12 RX ORDER — DULOXETIN HYDROCHLORIDE 30 MG/1
30 CAPSULE, DELAYED RELEASE ORAL DAILY
Status: DISCONTINUED | OUTPATIENT
Start: 2023-04-12 | End: 2023-04-14 | Stop reason: HOSPADM

## 2023-04-12 RX ORDER — LIDOCAINE HYDROCHLORIDE 20 MG/ML
INJECTION, SOLUTION INFILTRATION; PERINEURAL AS NEEDED
Status: DISCONTINUED | OUTPATIENT
Start: 2023-04-12 | End: 2023-04-12 | Stop reason: SURG

## 2023-04-12 RX ORDER — GABAPENTIN 300 MG/1
300 CAPSULE ORAL 2 TIMES DAILY
COMMUNITY

## 2023-04-12 RX ORDER — SODIUM CHLORIDE, SODIUM LACTATE, POTASSIUM CHLORIDE, CALCIUM CHLORIDE 600; 310; 30; 20 MG/100ML; MG/100ML; MG/100ML; MG/100ML
100 INJECTION, SOLUTION INTRAVENOUS CONTINUOUS
Status: DISCONTINUED | OUTPATIENT
Start: 2023-04-12 | End: 2023-04-12 | Stop reason: HOSPADM

## 2023-04-12 RX ORDER — FENTANYL CITRATE 50 UG/ML
50 INJECTION, SOLUTION INTRAMUSCULAR; INTRAVENOUS
Status: DISCONTINUED | OUTPATIENT
Start: 2023-04-12 | End: 2023-04-12 | Stop reason: HOSPADM

## 2023-04-12 RX ORDER — PROPOFOL 10 MG/ML
INJECTION, EMULSION INTRAVENOUS AS NEEDED
Status: DISCONTINUED | OUTPATIENT
Start: 2023-04-12 | End: 2023-04-12 | Stop reason: SURG

## 2023-04-12 RX ORDER — PANTOPRAZOLE SODIUM 40 MG/1
40 TABLET, DELAYED RELEASE ORAL DAILY
Status: DISCONTINUED | OUTPATIENT
Start: 2023-04-12 | End: 2023-04-12 | Stop reason: HOSPADM

## 2023-04-12 RX ORDER — MAGNESIUM HYDROXIDE 1200 MG/15ML
LIQUID ORAL AS NEEDED
Status: DISCONTINUED | OUTPATIENT
Start: 2023-04-12 | End: 2023-04-12 | Stop reason: HOSPADM

## 2023-04-12 RX ORDER — SODIUM CHLORIDE 0.9 % (FLUSH) 0.9 %
10 SYRINGE (ML) INJECTION EVERY 12 HOURS SCHEDULED
Status: DISCONTINUED | OUTPATIENT
Start: 2023-04-12 | End: 2023-04-12 | Stop reason: HOSPADM

## 2023-04-12 RX ORDER — KETAMINE HYDROCHLORIDE 10 MG/ML
INJECTION INTRAMUSCULAR; INTRAVENOUS AS NEEDED
Status: DISCONTINUED | OUTPATIENT
Start: 2023-04-12 | End: 2023-04-12 | Stop reason: SURG

## 2023-04-12 RX ORDER — ONDANSETRON 2 MG/ML
4 INJECTION INTRAMUSCULAR; INTRAVENOUS EVERY 6 HOURS PRN
Status: DISCONTINUED | OUTPATIENT
Start: 2023-04-12 | End: 2023-04-12 | Stop reason: HOSPADM

## 2023-04-12 RX ORDER — FENTANYL CITRATE 50 UG/ML
INJECTION, SOLUTION INTRAMUSCULAR; INTRAVENOUS AS NEEDED
Status: DISCONTINUED | OUTPATIENT
Start: 2023-04-12 | End: 2023-04-12 | Stop reason: SURG

## 2023-04-12 RX ORDER — ALBUTEROL SULFATE 90 UG/1
2 AEROSOL, METERED RESPIRATORY (INHALATION) EVERY 6 HOURS PRN
Status: DISCONTINUED | OUTPATIENT
Start: 2023-04-12 | End: 2023-04-12 | Stop reason: HOSPADM

## 2023-04-12 RX ORDER — NEOSTIGMINE METHYLSULFATE 1 MG/ML
INJECTION, SOLUTION INTRAVENOUS AS NEEDED
Status: DISCONTINUED | OUTPATIENT
Start: 2023-04-12 | End: 2023-04-12 | Stop reason: SURG

## 2023-04-12 RX ORDER — LIDOCAINE HYDROCHLORIDE 10 MG/ML
0.5 INJECTION, SOLUTION EPIDURAL; INFILTRATION; INTRACAUDAL; PERINEURAL ONCE AS NEEDED
Status: DISCONTINUED | OUTPATIENT
Start: 2023-04-12 | End: 2023-04-12 | Stop reason: HOSPADM

## 2023-04-12 RX ORDER — BUPIVACAINE HYDROCHLORIDE 5 MG/ML
INJECTION, SOLUTION EPIDURAL; INTRACAUDAL AS NEEDED
Status: DISCONTINUED | OUTPATIENT
Start: 2023-04-12 | End: 2023-04-12 | Stop reason: HOSPADM

## 2023-04-12 RX ORDER — ONDANSETRON 2 MG/ML
4 INJECTION INTRAMUSCULAR; INTRAVENOUS ONCE AS NEEDED
Status: DISCONTINUED | OUTPATIENT
Start: 2023-04-12 | End: 2023-04-12 | Stop reason: HOSPADM

## 2023-04-12 RX ORDER — FAMOTIDINE 10 MG/ML
20 INJECTION, SOLUTION INTRAVENOUS
Status: COMPLETED | OUTPATIENT
Start: 2023-04-12 | End: 2023-04-12

## 2023-04-12 RX ORDER — SODIUM CHLORIDE 0.9 % (FLUSH) 0.9 %
10 SYRINGE (ML) INJECTION EVERY 12 HOURS SCHEDULED
Status: DISCONTINUED | OUTPATIENT
Start: 2023-04-12 | End: 2023-04-14 | Stop reason: HOSPADM

## 2023-04-12 RX ORDER — NALOXONE HCL 0.4 MG/ML
0.4 VIAL (ML) INJECTION
Status: DISCONTINUED | OUTPATIENT
Start: 2023-04-12 | End: 2023-04-14 | Stop reason: HOSPADM

## 2023-04-12 RX ORDER — ROCURONIUM BROMIDE 10 MG/ML
INJECTION, SOLUTION INTRAVENOUS AS NEEDED
Status: DISCONTINUED | OUTPATIENT
Start: 2023-04-12 | End: 2023-04-12 | Stop reason: SURG

## 2023-04-12 RX ORDER — DEXAMETHASONE SODIUM PHOSPHATE 4 MG/ML
4 INJECTION, SOLUTION INTRA-ARTICULAR; INTRALESIONAL; INTRAMUSCULAR; INTRAVENOUS; SOFT TISSUE ONCE AS NEEDED
Status: COMPLETED | OUTPATIENT
Start: 2023-04-12 | End: 2023-04-12

## 2023-04-12 RX ORDER — SODIUM CHLORIDE 9 MG/ML
INJECTION, SOLUTION INTRAVENOUS AS NEEDED
Status: DISCONTINUED | OUTPATIENT
Start: 2023-04-12 | End: 2023-04-12 | Stop reason: HOSPADM

## 2023-04-12 RX ORDER — SODIUM CHLORIDE 9 MG/ML
40 INJECTION, SOLUTION INTRAVENOUS AS NEEDED
Status: DISCONTINUED | OUTPATIENT
Start: 2023-04-12 | End: 2023-04-14 | Stop reason: HOSPADM

## 2023-04-12 RX ORDER — MIDAZOLAM HYDROCHLORIDE 2 MG/2ML
1 INJECTION, SOLUTION INTRAMUSCULAR; INTRAVENOUS
Status: DISCONTINUED | OUTPATIENT
Start: 2023-04-12 | End: 2023-04-12 | Stop reason: HOSPADM

## 2023-04-12 RX ORDER — KETOROLAC TROMETHAMINE 30 MG/ML
INJECTION, SOLUTION INTRAMUSCULAR; INTRAVENOUS AS NEEDED
Status: DISCONTINUED | OUTPATIENT
Start: 2023-04-12 | End: 2023-04-12 | Stop reason: SURG

## 2023-04-12 RX ORDER — GLYCOPYRROLATE 0.2 MG/ML
INJECTION INTRAMUSCULAR; INTRAVENOUS AS NEEDED
Status: DISCONTINUED | OUTPATIENT
Start: 2023-04-12 | End: 2023-04-12 | Stop reason: SURG

## 2023-04-12 RX ORDER — BUPROPION HYDROCHLORIDE 300 MG/1
300 TABLET ORAL DAILY
Status: DISCONTINUED | OUTPATIENT
Start: 2023-04-12 | End: 2023-04-14 | Stop reason: HOSPADM

## 2023-04-12 RX ORDER — LIDOCAINE HYDROCHLORIDE AND EPINEPHRINE 10; 10 MG/ML; UG/ML
INJECTION, SOLUTION INFILTRATION; PERINEURAL AS NEEDED
Status: DISCONTINUED | OUTPATIENT
Start: 2023-04-12 | End: 2023-04-12 | Stop reason: HOSPADM

## 2023-04-12 RX ORDER — SODIUM CHLORIDE 9 MG/ML
40 INJECTION, SOLUTION INTRAVENOUS AS NEEDED
Status: DISCONTINUED | OUTPATIENT
Start: 2023-04-12 | End: 2023-04-12 | Stop reason: HOSPADM

## 2023-04-12 RX ORDER — ONDANSETRON 2 MG/ML
4 INJECTION INTRAMUSCULAR; INTRAVENOUS ONCE AS NEEDED
Status: COMPLETED | OUTPATIENT
Start: 2023-04-12 | End: 2023-04-12

## 2023-04-12 RX ORDER — SODIUM CHLORIDE 0.9 % (FLUSH) 0.9 %
10 SYRINGE (ML) INJECTION AS NEEDED
Status: DISCONTINUED | OUTPATIENT
Start: 2023-04-12 | End: 2023-04-12 | Stop reason: HOSPADM

## 2023-04-12 RX ORDER — ONDANSETRON 2 MG/ML
4 INJECTION INTRAMUSCULAR; INTRAVENOUS EVERY 6 HOURS PRN
Status: DISCONTINUED | OUTPATIENT
Start: 2023-04-12 | End: 2023-04-14 | Stop reason: HOSPADM

## 2023-04-12 RX ORDER — DEXMEDETOMIDINE HYDROCHLORIDE 100 UG/ML
INJECTION, SOLUTION INTRAVENOUS AS NEEDED
Status: DISCONTINUED | OUTPATIENT
Start: 2023-04-12 | End: 2023-04-12 | Stop reason: SURG

## 2023-04-12 RX ORDER — SODIUM CHLORIDE, SODIUM LACTATE, POTASSIUM CHLORIDE, CALCIUM CHLORIDE 600; 310; 30; 20 MG/100ML; MG/100ML; MG/100ML; MG/100ML
9 INJECTION, SOLUTION INTRAVENOUS CONTINUOUS PRN
Status: DISCONTINUED | OUTPATIENT
Start: 2023-04-12 | End: 2023-04-12 | Stop reason: HOSPADM

## 2023-04-12 RX ORDER — LEVOFLOXACIN 5 MG/ML
500 INJECTION, SOLUTION INTRAVENOUS EVERY 24 HOURS
Status: DISCONTINUED | OUTPATIENT
Start: 2023-04-12 | End: 2023-04-12 | Stop reason: HOSPADM

## 2023-04-12 RX ORDER — SODIUM CHLORIDE 0.9 % (FLUSH) 0.9 %
10 SYRINGE (ML) INJECTION AS NEEDED
Status: DISCONTINUED | OUTPATIENT
Start: 2023-04-12 | End: 2023-04-14 | Stop reason: HOSPADM

## 2023-04-12 RX ORDER — HYDROMORPHONE HYDROCHLORIDE 1 MG/ML
0.5 INJECTION, SOLUTION INTRAMUSCULAR; INTRAVENOUS; SUBCUTANEOUS
Status: DISCONTINUED | OUTPATIENT
Start: 2023-04-12 | End: 2023-04-14 | Stop reason: HOSPADM

## 2023-04-12 RX ORDER — DULOXETIN HYDROCHLORIDE 30 MG/1
30 CAPSULE, DELAYED RELEASE ORAL DAILY
COMMUNITY

## 2023-04-12 RX ADMIN — KETAMINE HYDROCHLORIDE 15 MG: 10 INJECTION INTRAMUSCULAR; INTRAVENOUS at 11:40

## 2023-04-12 RX ADMIN — Medication 10 ML: at 20:31

## 2023-04-12 RX ADMIN — NEOSTIGMINE METHYLSULFATE 4 MG: 0.5 INJECTION INTRAVENOUS at 12:29

## 2023-04-12 RX ADMIN — HYDROMORPHONE HYDROCHLORIDE 0.5 MG: 1 INJECTION, SOLUTION INTRAMUSCULAR; INTRAVENOUS; SUBCUTANEOUS at 13:27

## 2023-04-12 RX ADMIN — IOPAMIDOL 100 ML: 755 INJECTION, SOLUTION INTRAVENOUS at 00:47

## 2023-04-12 RX ADMIN — LIDOCAINE HYDROCHLORIDE 100 MG: 20 INJECTION, SOLUTION INFILTRATION; PERINEURAL at 11:27

## 2023-04-12 RX ADMIN — LEVOFLOXACIN 500 MG: 500 INJECTION, SOLUTION INTRAVENOUS at 02:40

## 2023-04-12 RX ADMIN — DEXAMETHASONE SODIUM PHOSPHATE 4 MG: 4 INJECTION, SOLUTION INTRAMUSCULAR; INTRAVENOUS at 11:15

## 2023-04-12 RX ADMIN — FENTANYL CITRATE 50 MCG: 50 INJECTION, SOLUTION INTRAMUSCULAR; INTRAVENOUS at 12:29

## 2023-04-12 RX ADMIN — GLYCOPYRROLATE 0.6 MG: 0.2 INJECTION INTRAMUSCULAR; INTRAVENOUS at 12:29

## 2023-04-12 RX ADMIN — SODIUM CHLORIDE, POTASSIUM CHLORIDE, SODIUM LACTATE AND CALCIUM CHLORIDE 100 ML/HR: 600; 310; 30; 20 INJECTION, SOLUTION INTRAVENOUS at 02:39

## 2023-04-12 RX ADMIN — DEXMEDETOMIDINE 8 MCG: 100 INJECTION, SOLUTION, CONCENTRATE INTRAVENOUS at 11:41

## 2023-04-12 RX ADMIN — PROPOFOL 100 MG: 10 INJECTION, EMULSION INTRAVENOUS at 11:27

## 2023-04-12 RX ADMIN — DULOXETINE HYDROCHLORIDE 30 MG: 30 CAPSULE, DELAYED RELEASE ORAL at 18:27

## 2023-04-12 RX ADMIN — BUPROPION HYDROCHLORIDE 300 MG: 300 TABLET, EXTENDED RELEASE ORAL at 18:27

## 2023-04-12 RX ADMIN — HYDROMORPHONE HYDROCHLORIDE 0.5 MG: 1 INJECTION, SOLUTION INTRAMUSCULAR; INTRAVENOUS; SUBCUTANEOUS at 18:26

## 2023-04-12 RX ADMIN — FAMOTIDINE 20 MG: 10 INJECTION, SOLUTION INTRAVENOUS at 11:14

## 2023-04-12 RX ADMIN — PROPOFOL 100 MG: 10 INJECTION, EMULSION INTRAVENOUS at 11:28

## 2023-04-12 RX ADMIN — KETAMINE HYDROCHLORIDE 15 MG: 10 INJECTION INTRAMUSCULAR; INTRAVENOUS at 11:27

## 2023-04-12 RX ADMIN — HYDROMORPHONE HYDROCHLORIDE 0.5 MG: 1 INJECTION, SOLUTION INTRAMUSCULAR; INTRAVENOUS; SUBCUTANEOUS at 22:21

## 2023-04-12 RX ADMIN — PANTOPRAZOLE SODIUM 40 MG: 40 TABLET, DELAYED RELEASE ORAL at 14:33

## 2023-04-12 RX ADMIN — HYDROMORPHONE HYDROCHLORIDE 1 MG: 1 INJECTION, SOLUTION INTRAMUSCULAR; INTRAVENOUS; SUBCUTANEOUS at 14:45

## 2023-04-12 RX ADMIN — ROCURONIUM BROMIDE 35 MG: 10 INJECTION, SOLUTION INTRAVENOUS at 11:27

## 2023-04-12 RX ADMIN — SODIUM CHLORIDE, POTASSIUM CHLORIDE, SODIUM LACTATE AND CALCIUM CHLORIDE: 600; 310; 30; 20 INJECTION, SOLUTION INTRAVENOUS at 11:24

## 2023-04-12 RX ADMIN — HYDROMORPHONE HYDROCHLORIDE 0.5 MG: 1 INJECTION, SOLUTION INTRAMUSCULAR; INTRAVENOUS; SUBCUTANEOUS at 13:17

## 2023-04-12 RX ADMIN — ONDANSETRON 4 MG: 2 INJECTION INTRAMUSCULAR; INTRAVENOUS at 11:14

## 2023-04-12 RX ADMIN — KETOROLAC TROMETHAMINE 30 MG: 30 INJECTION, SOLUTION INTRAMUSCULAR; INTRAVENOUS at 12:32

## 2023-04-12 RX ADMIN — SODIUM CHLORIDE 100 ML/HR: 9 INJECTION, SOLUTION INTRAVENOUS at 18:26

## 2023-04-12 RX ADMIN — SODIUM CHLORIDE 2 G: 9 INJECTION, SOLUTION INTRAVENOUS at 11:30

## 2023-04-12 RX ADMIN — MIDAZOLAM HYDROCHLORIDE 1 MG: 1 INJECTION, SOLUTION INTRAMUSCULAR; INTRAVENOUS at 11:15

## 2023-04-12 NOTE — DISCHARGE SUMMARY
Discharge Summary    Patient name: Delphine King    Medical record number: [id number]    Admission date: 4/11/2023  Discharge date: 4/12/2023     Attending physician: Dr. LJ Mandujano    Primary care physician: Dara Macias APRN    Referring physician: No referring provider defined for this encounter.    Consulting physician(s):    Condition on discharge: stable    Primary Diagnoses:  Acute cholecystitis with cholelithiasis    Secondary Diagnoses:  Choledocholithiasis     Operative Procedure: laparoscopic cholecystectomy with intraoperative cholangiograms    Hospital Course: The patient is a very pleasant 37 y.o. female that was admitted to the hospital with acute cholecystitis with cholelithiasis.  She was taken to surgery for laparoscopic cholecystectomy on April 12.  Cholangiogram showed a stone that was in the common duct preventing spill of dye into the duodenum.  Unfortunately, the gastroenterologist who does her ERCPs is out of town so Dr. Leach graciously agreed to accept her at [facility] so she could have an ERCP performed there.  She is being transferred to [facility] at this time in stable condition.    Discharge medications:      Discharge Medications      Continue These Medications      Instructions Start Date   albuterol sulfate  (90 Base) MCG/ACT inhaler  Commonly known as: PROVENTIL HFA;VENTOLIN HFA;PROAIR HFA   2 puffs, Inhalation, Every 6 Hours PRN      atorvastatin 20 MG tablet  Commonly known as: LIPITOR   20 mg, Oral, Nightly      buPROPion  MG 24 hr tablet  Commonly known as: WELLBUTRIN XL   300 mg, Oral, Daily      cetirizine 5 MG tablet  Commonly known as: zyrTEC   5 mg, Oral, Daily      cetirizine 10 MG tablet  Commonly known as: zyrTEC   10 mg, Oral, Daily PRN      DULoxetine 30 MG capsule  Commonly known as: CYMBALTA   30 mg, Oral, Daily      gabapentin 300 MG capsule  Commonly known as: NEURONTIN   400 mg, Oral, Daily      gabapentin 300 MG capsule  Commonly known  as: NEURONTIN   300 mg, Oral, 2 Times Daily      hydrOXYzine 25 MG tablet  Commonly known as: ATARAX   25 mg, Oral, Nightly PRN      immune globulin (human) 20 GM/200ML solution infusion   130 g, Intravenous, Every 30 Days      lamoTRIgine 25 MG tablet  Commonly known as: LaMICtal   50 mg, Oral, Daily      metaxalone 800 MG tablet  Commonly known as: SKELAXIN   1 p.o. every 6 hours as needed muscle spasm      multivitamin with minerals tablet tablet   1 tablet, Oral, Daily      ondansetron 4 MG tablet  Commonly known as: ZOFRAN   4 mg, Oral, Every 8 Hours PRN      pantoprazole 40 MG EC tablet  Commonly known as: PROTONIX   40 mg, Oral, Daily      PARoxetine 30 MG tablet  Commonly known as: PAXIL   30 mg, Oral, Every Morning      rivaroxaban 10 MG tablet  Commonly known as: XARELTO   10 mg, Oral, Daily      rivaroxaban 10 MG tablet  Commonly known as: XARELTO   10 mg, Oral, Daily      VESIcare 10 MG tablet  Generic drug: solifenacin   10 mg, Oral, Daily             Discharge instructions:      · No driving for 1 week and when no longer taking narcotics.  · You may ride in a car  · Diet as tolerated  · You may walk as tolerated  · You may walk up and down stairs as tolerated  · You may shower  · No lifting greater than 10 pounds for 2 weeks  · If you develop constipation, take a tablespoon of Milk of Magnesia once a day as needed  · You may place ice packs on your incisions for 20 minutes at a time as needed for pain       Follow-up appointment: Follow up with Dr. Mandujano in the office in 2 weeks. Call for appointment at [phone].

## 2023-04-12 NOTE — ANESTHESIA PROCEDURE NOTES
Airway  Urgency: elective    Date/Time: 4/12/2023 11:30 AM  Airway not difficult    General Information and Staff    Patient location during procedure: OR    Indications and Patient Condition  Indications for airway management: airway protection    Preoxygenated: yes  Mask difficulty assessment: 0 - not attempted    Final Airway Details  Final airway type: endotracheal airway      Successful airway: ETT  Cuffed: yes   Successful intubation technique: direct laryngoscopy  Endotracheal tube insertion site: oral  Blade: [place]  Blade size: 2  ETT size (mm): 7.0  Cormack-Lehane Classification: grade I - full view of glottis  Placement verified by: chest auscultation and capnometry   Measured from: lips  Number of attempts at approach: 1  Assessment: lips, teeth, and gum same as pre-op and atraumatic intubation

## 2023-04-12 NOTE — CASE MANAGEMENT/SOCIAL WORK
Discharge Planning Assessment  CRUZ Martinez     Patient Name: Delphine King  MRN: [id number]  Today's Date: 4/12/2023    Admit Date: 4/11/2023    Plan: Home with S/O   Discharge Needs Assessment     Row Name 04/12/23 1016       Living Environment    People in Home significant other    Name(s) of People in Home Alida Galaviz, S/O    Current Living Arrangements home    Duration at Residence 2 1/2 Y    Potentially Unsafe Housing Conditions none    Primary Care Provided by self    Provides Primary Care For no one    Caregiving Concerns no care giving concerns voiced by patient at this time    Family Caregiver if Needed significant other    Family Caregiver Names Alida, S/O    Quality of Family Relationships helpful;involved;supportive    Able to Return to Prior Arrangements yes    Living Arrangement Comments Patient states she lives with her S/O in a single story house with one step to gain entry       Resource/Environmental Concerns    Resource/Environmental Concerns none    Transportation Concerns none       Food Insecurity    Within the past 12 months, you worried that your food would run out before you got the money to buy more. Never true    Within the past 12 months, the food you bought just didn't last and you didn't have money to get more. Never true       Transition Planning    Patient/Family Anticipates Transition to home with family    Patient/Family Anticipated Services at Transition none    Transportation Anticipated family or friend will provide  pt states her S/O Alida will be able to provide ride home at discharge       Discharge Needs Assessment    Readmission Within the Last 30 Days no previous admission in last 30 days    Current Outpatient/Agency/Support Group --  none    Equipment Currently Used at Home cane, straight;commode;grab bar  bed rail    Concerns to be Addressed denies needs/concerns at this time;adjustment to diagnosis/illness;discharge planning    Concerns Comments pt voiced no  discharge needs at this time.    Anticipated Changes Related to Illness none    Equipment Needed After Discharge none    Outpatient/Agency/Support Group Needs --  pt states she will not need these services at discharge    Discharge Facility/Level of Care Needs --  pt states she will not need these services at discharge    Provided Post Acute Provider List? Refused    Refused Provider List Comment pt declined    Patient's Choice of Community Agency(s) none    Current Discharge Risk --  none    Discharge Coordination/Progress Patient states she plans on returning home at discharge with  her S/O to help as needed and voiced no discharge plans at this time.               Discharge Plan     Row Name 04/12/23 1024       Plan    Plan Home with S/O    Patient/Family in Agreement with Plan yes    Plan Comments Into room and introduced self and role of CM. Discussed discharge disposition with patient and her S/O Alida with permission. Patient is currently resting in bed and remains NPO for surgery today. Patient confirms that the info on her face sheet is correct and she see's ROSS Groves as PCP. She states she uses [facility] in [place] and currently has no problem picking up or paying for her med's. She also states that she does not  have a living will and CM provided patient with information regarding one per her request. 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 She states she uses a straight cane for mobility. She states she is independent with her ADL's and drives occasionally and her S/O provides her transportation and will be able to provide ride home at discharge. She also states that she uses a straight cane, grab bars, bed rail and has an elevated commode at  home and does not anticipate needing any other equipment at discharge. Patient she is current with  [facility] for her IVIG infusions at home and declines any other community resources at this time such as , [facility] and LTC. Patient states she plans on returning home at discharge with her S/O to help as needed and voiced no discharge needs at this time. Name and number placed on white board in room. CM will follow.              Continued Care and Services - Admitted Since 4/11/2023    Coordination has not been started for this encounter.          Demographic Summary     Row Name 04/12/23 1016       General Information    Admission Type observation    Arrived From home    Referral Source admission list    Reason for Consult discharge planning    Preferred Language English       Contact Information    Permission Granted to Share Info With                Functional Status    No documentation.                Psychosocial    No documentation.                Abuse/Neglect    No documentation.                Legal    No documentation.                Substance Abuse    No documentation.                Patient Forms    No documentation.                   Phuong Moreno RN

## 2023-04-12 NOTE — ANESTHESIA POSTPROCEDURE EVALUATION
Patient: Delphine King    Procedure Summary     Date: 04/12/23 Room / Location: [facility] OR 1 /  [facility] OR    Anesthesia Start: 1124 Anesthesia Stop: 1249    Procedure: CHOLECYSTECTOMY LAPAROSCOPIC INTRAOPERATIVE CHOLANGIOGRAM (Abdomen) Diagnosis:       Acute cholecystitis      (Acute cholecystitis [K81.0])    Surgeons: Goldie Mandujano DO Provider: Rashel Key CRNA    Anesthesia Type: general ASA Status: 3          Anesthesia Type: general    Vitals  Vitals Value Taken Time   /57 04/12/23 1335   Temp 98.5 °F (36.9 °C) 04/12/23 1245   Pulse 57 04/12/23 1337   Resp 17 04/12/23 1310   SpO2 92 % 04/12/23 1337   Vitals shown include unvalidated device data.        Post Anesthesia Care and Evaluation    Patient location during evaluation: bedside  Patient participation: complete - patient participated  Level of consciousness: awake and alert  Pain score: 0  Pain management: adequate    Airway patency: patent  Anesthetic complications: No anesthetic complications  PONV Status: none  Cardiovascular status: acceptable  Respiratory status: acceptable  Hydration status: acceptable  No anesthesia care post op

## 2023-04-12 NOTE — H&P
General Surgery      Patient Care Team:  Dara Macias APRN as PCP - General (Family Medicine)    CHIEF COMPLAINT: Acute cholecystitis    HISTORY OF PRESENT ILLNESS:    This very pleasant 37-year-old female presented to the emergency department with a 10-day history of right upper quadrant and epigastric pain.  She was seen at [facility] and was diagnosed with indigestion and urinary tract infection.  She was given antibiotics.  Her pain was sharp and squeezing and would radiate to the mid back.  She would have nausea associated with this as well.  She has been moving her bowels normally.  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  She also takes Xarelto for history of PE.  She has no chest pain or shortness of breath.  She has no fevers or chills.  She has no other symptoms.      Past Medical History:   Diagnosis Date   • Anxiety    • Asthma    • Depression    • Hyperlipidemia    • MMN (multifocal motor neuropathy)    • Neuropathy    • PE (pulmonary thromboembolism)    • Stroke      Past Surgical History:   Procedure Laterality Date   • ANAL FISTULA REPAIR     •  SECTION     • HYSTERECTOMY     • PORTACATH PLACEMENT Right     power port   • TONSILLECTOMY     • WISDOM TOOTH EXTRACTION       History reviewed. No pertinent family history.  Social History     Tobacco Use   • Smoking status: Every Day     Packs/day: 0.50     Types: Cigarettes   Vaping Use   • Vaping Use: Some days   • Substances: Nicotine, THC, Flavoring   • Devices: Disposable   Substance Use Topics   • Alcohol use: Not Currently   • Drug use: Yes     Frequency: 7.0 times per week     Types: Marijuana     Comment: hemp     Medications Prior to Admission   Medication Sig Dispense Refill Last Dose   • albuterol sulfate  (90 Base) MCG/ACT inhaler Inhale 2 puffs Every 6 (Six) Hours As Needed for Wheezing.   2023   • atorvastatin (LIPITOR) 20 MG tablet  Take 1 tablet by mouth Every Night.   4/11/2023   • buPROPion XL (WELLBUTRIN XL) 300 MG 24 hr tablet Take 1 tablet by mouth Daily.   4/11/2023   • cetirizine (zyrTEC) 10 MG tablet Take 1 tablet by mouth Daily As Needed for Allergies.   Past Month   • DULoxetine (CYMBALTA) 30 MG capsule Take 1 capsule by mouth Daily.      • gabapentin (NEURONTIN) 300 MG capsule Take 1 capsule by mouth 2 (Two) Times a Day.      • hydrOXYzine (ATARAX) 25 MG tablet Take 1 tablet by mouth At Night As Needed for Itching.   4/11/2023   • immune globulin, human, 20 GM/200ML solution infusion Infuse 130 g into a venous catheter Every 30 (Thirty) Days.   4/11/2023   • multivitamin with minerals (MULTIVITAMIN ADULT PO) Take 1 tablet by mouth Daily.   4/11/2023   • ondansetron (ZOFRAN) 4 MG tablet Take 1 tablet by mouth Every 8 (Eight) Hours As Needed for Nausea or Vomiting.      • pantoprazole (PROTONIX) 40 MG EC tablet Take 1 tablet by mouth Daily.   4/11/2023   • rivaroxaban (XARELTO) 10 MG tablet Take 1 tablet by mouth Daily.   4/11/2023   • rivaroxaban (XARELTO) 10 MG tablet Take 1 tablet by mouth Daily.   4/11/2023   • solifenacin (VESIcare) 10 MG tablet Take 1 tablet by mouth Daily.   4/11/2023   • cetirizine (zyrTEC) 5 MG tablet Take 1 tablet by mouth Daily.      • gabapentin (NEURONTIN) 300 MG capsule Take 400 mg by mouth Daily.      • lamoTRIgine (LaMICtal) 25 MG tablet Take 50 mg by mouth Daily.      • metaxalone (SKELAXIN) 800 MG tablet 1 p.o. every 6 hours as needed muscle spasm 20 tablet 0    • PARoxetine (PAXIL) 30 MG tablet Take 30 mg by mouth Every Morning.        Allergies:  Penicillins, Pineapple, Pseudoeph-doxylamine-dm-apap, Shellfish-derived products, and Vancomycin    REVIEW OF SYSTEMS:  Please see the above history of present illness for pertinent positives and negatives.  The remainder of the patient's systems have been reviewed and are negative.     Vital Signs  Temp:  [97.6 °F (36.4 °C)-98.1 °F (36.7 °C)] 97.9 °F  "(36.6 °C)  Heart Rate:  [55-72] 56  Resp:  [16-20] 19  BP: ()/(52-88) 112/67    Flowsheet Rows    Flowsheet Row First Filed Value   Admission Height 170.2 cm (67\") Documented at 04/11/2023 2329   Admission Weight 114 kg (252 lb) Documented at 04/11/2023 2329           Physical Exam:  Physical Exam   Constitutional: Patient appears well-developed and well-nourished and in no acute distress   HEENT:   Head: Normocephalic and atraumatic.   Eyes:  Pupils are equal, round, and reactive to light.  Mouth and Throat: Patient has moist mucous membranes. Oropharynx is clear of any erythema or exudate.     Neck: Neck supple. No JVD present. No thyromegaly present. No lymphadenopathy present.  Cardiovascular: Regular rate, regular rhythm.  Pulmonary/Chest: Lungs are clear to auscultation bilaterally.  Abdominal: obese, soft, normal bowel sounds  Musculoskeletal: Normal posture.  Extremities: No edema. Pulses are palpable in all 4 extremities.  Neurological: Patient is alert and oriented.  Psychological:   Mood and behavior appropriate.  Skin: Skin is warm and dry.    Debilities/Disabilities Identified: None    Emotional Behavior: Appropriate           Results Review:    I reviewed the patient's new clinical results.  Lab Results (most recent)     Procedure Component Value Units Date/Time    Comprehensive Metabolic Panel [[id number]]  (Abnormal) Collected: 04/11/23 2341    Specimen: Blood Updated: 04/12/23 0017     Glucose 115 mg/dL      BUN 8 mg/dL      Creatinine 0.76 mg/dL      Sodium 135 mmol/L      Potassium 3.7 mmol/L      Chloride 103 mmol/L      CO2 23.0 mmol/L      Calcium 8.6 mg/dL      Total Protein 8.4 g/dL      Albumin 3.6 g/dL      ALT (SGPT) 750 U/L      AST (SGOT) 593 U/L      Alkaline Phosphatase 292 U/L      Total Bilirubin 1.6 mg/dL      Globulin 4.8 gm/dL      A/G Ratio 0.8 g/dL      BUN/Creatinine Ratio 10.5     Anion Gap 9.0 mmol/L      eGFR 103.6 mL/min/1.73     Narrative:      GFR Normal >60  Chronic " Kidney Disease <60  Kidney Failure <15      Urinalysis, Microscopic Only - Urine, Clean Catch [[id number]]  (Abnormal) Collected: 04/11/23 2340    Specimen: Urine, Clean Catch Updated: 04/12/23 0008     RBC, UA 13-20 /HPF      WBC, UA 0-2 /HPF      Comment: Urine culture not indicated.        Bacteria, UA Trace /HPF      Squamous Epithelial Cells, UA 0-2 /HPF      Hyaline Casts, UA None Seen /LPF      Methodology Manual Light Microscopy    Lipase [[id number]]  (Normal) Collected: 04/11/23 2341    Specimen: Blood Updated: 04/12/23 0006     Lipase 60 U/L     Urinalysis With Culture If Indicated - Urine, Clean Catch [[id number]]  (Abnormal) Collected: 04/11/23 2340    Specimen: Urine, Clean Catch Updated: 04/11/23 2357     Color, UA Yellow     Appearance, UA Clear     pH, UA 5.5     Specific Gravity, UA 1.015     Glucose, UA Negative     Ketones, UA Negative     Bilirubin, UA Small (1+)     Blood, UA Large (3+)     Protein, UA Negative     Leuk Esterase, UA Negative     Nitrite, UA Negative     Urobilinogen, UA 1.0 E.U./dL    Narrative:      In absence of clinical symptoms, the presence of pyuria, bacteria, and/or nitrites on the urinalysis result does not correlate with infection.    CBC & Differential [[id number]]  (Normal) Collected: 04/11/23 2341    Specimen: Blood Updated: 04/11/23 2351    Narrative:      The following orders were created for panel order CBC & Differential.  Procedure                               Abnormality         Status                     ---------                               -----------         ------                     CBC Auto Differential[[id number]]        Normal              Final result                 Please view results for these tests on the individual orders.    CBC Auto Differential [[id number]]  (Normal) Collected: 04/11/23 2341    Specimen: Blood Updated: 04/11/23 2351     WBC 6.92 10*3/mm3      RBC 4.30 10*6/mm3      Hemoglobin 13.3 g/dL      Hematocrit 40.6 %      MCV 94.4 fL       MCH 30.9 pg      MCHC 32.8 g/dL      RDW 13.1 %      RDW-SD 44.7 fl      MPV 11.0 fL      Platelets 221 10*3/mm3      Neutrophil % 49.8 %      Lymphocyte % 34.7 %      Monocyte % 8.4 %      Eosinophil % 5.5 %      Basophil % 1.2 %      Immature Grans % 0.4 %      Neutrophils, Absolute 3.45 10*3/mm3      Lymphocytes, Absolute 2.40 10*3/mm3      Monocytes, Absolute 0.58 10*3/mm3      Eosinophils, Absolute 0.38 10*3/mm3      Basophils, Absolute 0.08 10*3/mm3      Immature Grans, Absolute 0.03 10*3/mm3      nRBC 0.0 /100 WBC           Imaging Results (Most Recent)     Procedure Component Value Units Date/Time    CT Abdomen Pelvis With Contrast [[id number]] Collected: 04/12/23 0101     Updated: 04/12/23 0103    Narrative:      CT Abdomen Pelvis W    INDICATION:   Right upper quadrant pain    TECHNIQUE:   CT of the abdomen and pelvis with IV contrast. Coronal and sagittal reconstructions were obtained.  Radiation dose reduction techniques included automated exposure control or exposure modulation based on body size. Radiation audit for CT and nuclear  cardiology exams in the last 12 months: 0.     COMPARISON:   None available.    FINDINGS:    The visualized lung bases are clear. No destructive osseous lesion.    Favor subtle cholelithiasis near the gallbladder neck. Gallbladder is moderately distended and would favor subtle gallbladder wall thickening. Early changes of cholecystitis could have this appearance but there is no biliary dilatation. Normal appearance  of the liver, pancreas, spleen and both adrenal glands.    The kidneys enhance symmetrically. No worrisome renal lesion. No hydronephrosis.    Uterus is present. No large adnexal mass.    No evidence of bowel obstruction. No localizing perienteric inflammatory change. Normal appendix.    Normal caliber of the abdominal aorta. No lymphadenopathy within the abdomen or pelvis by size criteria.      Impression:        1.  Favor subtle cholelithiasis near the  gallbladder neck. Gallbladder is moderately distended and would favor subtle gallbladder wall thickening. While nonspecific, early changes of cholecystitis could have this appearance but there is no biliary  dilatation.        Signer Name: MILADIS BLANK MD   Signed: 4/12/2023 1:01 AM   Workstation Name: [id number] [id number]    [facility]        reviewed    ECG/EMG Results (most recent)     None            Assessment & Plan     Acute cholecystitis  I discussed with the patient the benefits and risks of performing a laparoscopic cholecystectomy with intraoperative cholangiogram possible open procedure.  Benefits and risks not limited to but including: Bleeding, infection, hernia formation, having to convert to an open procedure, injury to intra-abdominal structures, intra-abdominal abscess, intra-abdominal biloma, bile leak, DVT, PE, atelectasis, pneumonia, anesthetic complications.  The patient appeared to understand and is willing to proceed.    Thank you for allowing me to participate in the care of this interesting patient.      Goldie Mandujano DO  04/12/23  10:57 EDT

## 2023-04-12 NOTE — ED PROVIDER NOTES
Subjective   History of Present Illness  37-year-old female presents with right upper quadrant and epigastric abdominal pain.  Pain has been intermittent for about 10 days now.  Was evaluated at [facility] when pain started, was diagnosed with indigestion, UTI.  She completed course of antibiotics and reports no urinary symptoms currently but states she has had daily episodes of pain since then.  Describes the episodes as sharp and squeezing to epigastric and right upper quadrant.  Pain sometimes radiates to mid back.  She occasionally has some nausea which she attributes to the pain but she has not had vomiting, has had normal p.o. intake.  Normal bowel movements.  Has not identified any precipitating factors and attacks do not necessarily follow p.o. intake.  Patient has history of hysterectomy, C-sections.  No chest pain or shortness of breath.  No cough or congestion.  No fevers or chills.  Per nursing staff patient was on good amount of distress upon initial arrival and triage but by time of my evaluation patient states her tach seems to have resolved and she is pain-free at that time.        Review of Systems   All other systems reviewed and are negative.      Past Medical History:   Diagnosis Date   • Anxiety    • Depression    • Hyperlipidemia    • MMN (multifocal motor neuropathy)        Allergies   Allergen Reactions   • Penicillins Hives and Shortness Of Breath   • Pineapple Hives, Anaphylaxis, Itching and Shortness Of Breath   • Pseudoeph-Doxylamine-Dm-Apap Hives   • Shellfish-Derived Products Anaphylaxis, Hives, Itching and Shortness Of Breath   • Vancomycin Itching       Past Surgical History:   Procedure Laterality Date   • ANAL FISTULA REPAIR     • HYSTERECTOMY     • PORTACATH PLACEMENT Right     power port   • TONSILLECTOMY         History reviewed. No pertinent family history.    Social History     Socioeconomic History   • Marital status: Significant Other   Tobacco Use   • Smoking status:  Every Day     Packs/day: 0.50     Types: Cigarettes   Substance and Sexual Activity   • Alcohol use: Not Currently   • Drug use: Yes     Frequency: 7.0 times per week     Types: Marijuana     Comment: hemp           Objective   Physical Exam  Constitutional:       General: She is not in acute distress.     Appearance: She is not ill-appearing or toxic-appearing.   HENT:      Head: Normocephalic and atraumatic.      Mouth/Throat:      Mouth: Mucous membranes are moist.      Pharynx: Oropharynx is clear.   Eyes:      Extraocular Movements: Extraocular movements intact.      Pupils: Pupils are equal, round, and reactive to light.   Cardiovascular:      Rate and Rhythm: Normal rate and regular rhythm.      Heart sounds: Normal heart sounds.   Pulmonary:      Effort: Pulmonary effort is normal. No respiratory distress.      Breath sounds: Normal breath sounds.   Abdominal:      General: There is no distension.      Palpations: Abdomen is soft.      Tenderness: There is no right CVA tenderness, left CVA tenderness, guarding or rebound.      Comments: Minimal epigastric and right upper quadrant tenderness   Musculoskeletal:         General: No deformity or signs of injury. Normal range of motion.   Skin:     General: Skin is warm and dry.   Neurological:      General: No focal deficit present.      Mental Status: She is alert and oriented to person, place, and time. Mental status is at baseline.   Psychiatric:         Mood and Affect: Mood normal.         Behavior: Behavior normal.         Thought Content: Thought content normal.         Judgment: Judgment normal.         Procedures           ED Course  ED Course as of 04/12/23 0114   Wed Apr 12, 2023   0110 Patient has had no further pain since her initial pain resolved in the emergency department.  She does have elevated liver enzymes but normal white blood cell count.  Stable vital signs.  She is overall nontoxic-appearing.  Discussed options with patient and she  prefers to stay and address this issue sooner rather than later.  Will consult general surgery. [TD]   0113 Discussed with Dr. Mandujano who agrees to admit patient to her service [TD]      ED Course User Index  [TD] Ever Natarajan MD                                           [facility]    Final diagnoses:   Acute cholecystitis       ED Disposition  ED Disposition     ED Disposition   Decision to Admit    Condition   --    Comment   Level of Care: Med/Surg [1]   Diagnosis: Acute cholecystitis [575.0.ICD-9-CM]   Admitting Physician: STEPHEN MANDUJANO [[id number]]   Attending Physician: STEPHEN MANDUJANO [[id number]]               No follow-up provider specified.       Medication List      No changes were made to your prescriptions during this visit.          Ever Natarajan MD  04/12/23 0114

## 2023-04-12 NOTE — PLAN OF CARE
Goal Outcome Evaluation:               Patient arrived to floor VSS, pain mild and A&Ox4. Lap sites x 5 CDI.

## 2023-04-12 NOTE — PLAN OF CARE
Problem: Adult Inpatient Plan of Care  Goal: Plan of Care Review  4/12/2023 0548 by Funmilayo Oconnor RN  Outcome: Ongoing, Progressing  Flowsheets  Taken 4/12/2023 0548  Plan of Care Reviewed With: patient  Taken 4/12/2023 0542  Progress: no change  Outcome Evaluation: Admitted from ER for pain, possible cholecystitis. No complaints of pain or discomfort. Possible surgery today. Rested well during the night. VSS.  4/12/2023 0542 by Funmilayo Oconnor RN  Outcome: Ongoing, Progressing  Flowsheets (Taken 4/12/2023 0542)  Progress: no change  Plan of Care Reviewed With: patient  Outcome Evaluation: Admitted from ER for pain, possible cholecystitis. No complaints of pain or discomfort. Possible surgery today. Rested well during the night. VSS.  Goal: Patient-Specific Goal (Individualized)  4/12/2023 0548 by Funmilayo Oconnor RN  Outcome: Ongoing, Progressing  4/12/2023 0542 by Funmilayo Oconnor RN  Outcome: Ongoing, Progressing  Goal: Absence of Hospital-Acquired Illness or Injury  4/12/2023 0548 by Funmilayo Oconnor RN  Outcome: Ongoing, Progressing  4/12/2023 0542 by Funmilayo Oconnor RN  Outcome: Ongoing, Progressing  Intervention: Identify and Manage Fall Risk  Recent Flowsheet Documentation  Taken 4/12/2023 0446 by Funmilayo Oconnor RN  Safety Promotion/Fall Prevention:   safety round/check completed   room organization consistent   nonskid shoes/slippers when out of bed   fall prevention program maintained   clutter free environment maintained   assistive device/personal items within reach  Taken 4/12/2023 0200 by Funmilayo Oconnor RN  Safety Promotion/Fall Prevention:   safety round/check completed   room organization consistent   nonskid shoes/slippers when out of bed   fall prevention program maintained   clutter free environment maintained   assistive device/personal items within reach  Intervention: Prevent Skin Injury  Recent Flowsheet Documentation  Taken 4/12/2023 0446 by Funmilayo Oconnor RN  Body Position: position  changed independently  Taken 4/12/2023 0200 by Funmilayo Oconnor RN  Body Position: position changed independently  Intervention: Prevent and Manage VTE (Venous Thromboembolism) Risk  Recent Flowsheet Documentation  Taken 4/12/2023 0200 by Funmilayo Oconnor RN  Activity Management: ambulated in room  VTE Prevention/Management:   bilateral   sequential compression devices off  Goal: Optimal Comfort and Wellbeing  4/12/2023 0548 by Funmilayo Oconnor RN  Outcome: Ongoing, Progressing  4/12/2023 0542 by Funmilayo Oconnor RN  Outcome: Ongoing, Progressing  Intervention: Provide Person-Centered Care  Recent Flowsheet Documentation  Taken 4/12/2023 0200 by Funmilayo Oconnor RN  Trust Relationship/Rapport:   care explained   choices provided   questions encouraged   questions answered   reassurance provided   thoughts/feelings acknowledged  Goal: Readiness for Transition of Care  4/12/2023 0548 by Funmilayo Oconnor RN  Outcome: Ongoing, Progressing  4/12/2023 0542 by Funmilayo Oconnor RN  Outcome: Ongoing, Progressing  Intervention: Mutually Develop Transition Plan  Recent Flowsheet Documentation  Taken 4/12/2023 0158 by Funmilayo Oconnor RN  Transportation Anticipated: family or friend will provide  Patient/Family Anticipated Services at Transition: none  Patient/Family Anticipates Transition to: home with family  Taken 4/12/2023 0153 by Funmilayo Oconnor RN  Equipment Currently Used at Home: (Foot brace for foot drop and IV pole)   cane, straight   commode   grab bar   other (see comments)     Problem: Pain Acute  Goal: Acceptable Pain Control and Functional Ability  Outcome: Ongoing, Progressing   Goal Outcome Evaluation:  Plan of Care Reviewed With: patient        Progress: no change  Outcome Evaluation: Admitted from ER for pain, possible cholecystitis. No complaints of pain or discomfort. Possible surgery today. Rested well during the night. VSS.

## 2023-04-12 NOTE — OP NOTE
PREOPERATIVE DIAGNOSIS: acute cholecystitis with cholelithiasis   POSTOPERATIVE DIAGNOSIS:  Acute cholecystitis with cholelithiasis and choledocholithiasis     PROCEDURE: Laparoscopic cholecystectomy with Intraoperative cholangiogram  SURGEON/STAFF: Nazia  Assistant: Shyann Tillman CSA was responsible for performing the following activities: Retraction, Closing, Placing Dressing and Held/Positioned Camera and their skilled assistance was necessary for the success of this case.     SPECIMENS: Gallbladder.  INTRAOPERATIVE COMPLICATIONS: None.  ANESTHESIA: General.  BLOOD LOSS:  25 ml  COUNTS: Needle and sponge counts correct.    Findings:  Acutely inflamed gallbladder with stones.  Cholangiograms showed dilated duct system with a stone obstructing the duodenum.     Clinical Note: This very pleasant patient presented to my office with the aforementioned complaints.  Benefits and risks of a laparoscopic cholecystectomy with intraoperative cholangiogram possible open procedure were discussed.  Benefits and risks not limited to but including:Bleeding, infection, hernia formation, injury to intra-abdominal structures, bile leak, biloma, intra-abdominal abscess, DVT, PE, atelectasis pneumonia, anesthesia complications. She agreed and was consented for operative management without duress.     PROCEDURE: The patient was brought to the operating room in stable condition. Perioperative antibiotics were given and sequential compression devices applied. She was then laid supine on the operating room table. General anesthesia was induced by the Anesthesia Service without difficulty.     At this time, the patient's abdomen was accessed at the level of the umbilicus in open cutdown technique and insertion of a 12-mm trocar. The abdomen was then insufflated. Brief survey of the abdominal cavity revealed no evidence of injury from insertion of the trocar, or no other intraabdominal pathology. At this time the patient was placed  in steep reverse Trendelenburg and a slightly left-side down position. Three additional 5-mm trocars were placed, all in the standard position. This was under direct vision.       The peritoneal attachment of the gallbladder at the level of the infundibulum was scored from laterally to medially, terminating at the level of the hepatic edge. The peritoneum was gently stripped down, exposing the underlying cystic artery and cystic duct. This was also done on the lateral side of the gallbladder by reflecting the gallbladder medially. The peritoneal attachment here was again gently dissected inferiorly. After completely exposing the cystic duct as well as cystic artery, a retro-cystic window was created. These 2 structures, the cystic duct and cystic artery, were further delineated. A firm critical view was obtained, visualizing healthy-appearing hepatic tissue behind the 2 structures, with no evidence of looping, bridging or tenting of the common bile duct.     A clip was placed distally at the cystic duct and a cystic duct incision with laparoscopic scissors was performed. A percutaneous cholangio-catheter was inserted into the patient abdomen. The catheter was placed and secured into the cystic duct. Cholangiogram was performed that showed dilated ducts with no spillage of dye into the duodenum due to a stone. The cholangiocatheter was removed and the distal portion of thecystic duct was then clipped twice and ligated.  The cystic artery was then triply clipped and ligated.  It was inspected and seen to be in intact. The gallbladder was then carefully dissected off the hepatic bed using hook electrocautery. It was firmly adherent to the underlying bed, and an effort careful and meticulous hemostasis was undertaken. The gallbladder, after being removed from the hepatic bed, was placed in an EndoCatch bag. It was removed through the umbilical trocar without difficulty.    Next, the umbilical trocar was reinserted into  the abdomen and the liver bed was inspected. Hemostasis was perfected using FloSeal and surgicel. Copious irrigation was carried out. The clips were intact and there was no bleeding or bile leakage noted.  A 10 Swiss flat RENU drain was placed into the liver bed.  The trocars were then removed under direct vision, air was deflated from the abdomen, and a Kohli trocar site fascia was closed with 0 Vicryl suture.  The incisions were then closed with 40 Vicryls suture. Sterile dressings were applied.    Anesthesia was reversed, the ET tube and OG tube were removed.  And the patient was taken into the recovery area in stable postoperative condition having tolerated the procedure well.    LJ Mandujano DO

## 2023-04-12 NOTE — NURSING NOTE
Nursing IP/EMS Transfer  Delphine King  37 y.o.  female    HPI :   Chief Complaint   Patient presents with   • Abdominal Pain       Admitting doctor:   Goldie Mandujano DO    Admitting diagnosis:   The encounter diagnosis was Acute cholecystitis.    Code status:   Current Code Status     Date Active Code Status Order ID Comments User Context       Not on file          Allergies:   Penicillins, Pineapple, Pseudoeph-doxylamine-dm-apap, Shellfish-derived products, and Vancomycin    Isolation:   No active isolations    Intake and Output    Intake/Output Summary (Last 24 hours) at 4/12/2023 1437  Last data filed at 4/12/2023 1329  Gross per 24 hour   Intake 1900 ml   Output 30 ml   Net 1870 ml       Weight:       04/12/23  0137   Weight: 114 kg (252 lb)       Most recent vitals:   Vitals:    04/12/23 1330 04/12/23 1335 04/12/23 1354 04/12/23 1409   BP: 104/55 101/57 102/54 98/59   BP Location: Right arm Right arm Right arm Right arm   Patient Position: Lying Lying Lying Lying   Pulse: 54 54 63 52   Resp:   18 18   Temp:   97.6 °F (36.4 °C)    TempSrc:   Oral    SpO2: 94% 96% 96% 96%   Weight:       Height:           Active LDAs/IV Access:   Lines, Drains & Airways     Active LDAs     Name Placement date Placement time Site Days    Peripheral IV 04/11/23 2333 Left Antecubital 04/11/23 2333  Antecubital  less than 1    Closed/Suction Drain 1 Abdomen Bulb 04/12/23  1219  Abdomen  less than 1                Labs (abnormal labs have a star):   Lab Results (last 24 hours)     Procedure Component Value Units Date/Time    Tissue Pathology Exam [[id number]] Collected: 04/12/23 1205    Specimen: Tissue from Gallbladder Updated: 04/12/23 1312    Comprehensive Metabolic Panel [[id number]]  (Abnormal) Collected: 04/11/23 2341    Specimen: Blood Updated: 04/12/23 0017     Glucose 115 mg/dL      BUN 8 mg/dL      Creatinine 0.76 mg/dL      Sodium 135 mmol/L      Potassium 3.7 mmol/L      Chloride 103 mmol/L      CO2 23.0 mmol/L       Calcium 8.6 mg/dL      Total Protein 8.4 g/dL      Albumin 3.6 g/dL      ALT (SGPT) 750 U/L      AST (SGOT) 593 U/L      Alkaline Phosphatase 292 U/L      Total Bilirubin 1.6 mg/dL      Globulin 4.8 gm/dL      A/G Ratio 0.8 g/dL      BUN/Creatinine Ratio 10.5     Anion Gap 9.0 mmol/L      eGFR 103.6 mL/min/1.73     Narrative:      GFR Normal >60  Chronic Kidney Disease <60  Kidney Failure <15      Urinalysis, Microscopic Only - Urine, Clean Catch [[id number]]  (Abnormal) Collected: 04/11/23 2340    Specimen: Urine, Clean Catch Updated: 04/12/23 0008     RBC, UA 13-20 /HPF      WBC, UA 0-2 /HPF      Comment: Urine culture not indicated.        Bacteria, UA Trace /HPF      Squamous Epithelial Cells, UA 0-2 /HPF      Hyaline Casts, UA None Seen /LPF      Methodology Manual Light Microscopy    Lipase [[id number]]  (Normal) Collected: 04/11/23 2341    Specimen: Blood Updated: 04/12/23 0006     Lipase 60 U/L     Urinalysis With Culture If Indicated - Urine, Clean Catch [[id number]]  (Abnormal) Collected: 04/11/23 2340    Specimen: Urine, Clean Catch Updated: 04/11/23 2357     Color, UA Yellow     Appearance, UA Clear     pH, UA 5.5     Specific Gravity, UA 1.015     Glucose, UA Negative     Ketones, UA Negative     Bilirubin, UA Small (1+)     Blood, UA Large (3+)     Protein, UA Negative     Leuk Esterase, UA Negative     Nitrite, UA Negative     Urobilinogen, UA 1.0 E.U./dL    Narrative:      In absence of clinical symptoms, the presence of pyuria, bacteria, and/or nitrites on the urinalysis result does not correlate with infection.    CBC & Differential [[id number]]  (Normal) Collected: 04/11/23 2341    Specimen: Blood Updated: 04/11/23 2351    Narrative:      The following orders were created for panel order CBC & Differential.  Procedure                               Abnormality         Status                     ---------                               -----------         ------                     CBC Auto  Differential[[id number]]        Normal              Final result                 Please view results for these tests on the individual orders.    CBC Auto Differential [[id number]]  (Normal) Collected: 04/11/23 2341    Specimen: Blood Updated: 04/11/23 2351     WBC 6.92 10*3/mm3      RBC 4.30 10*6/mm3      Hemoglobin 13.3 g/dL      Hematocrit 40.6 %      MCV 94.4 fL      MCH 30.9 pg      MCHC 32.8 g/dL      RDW 13.1 %      RDW-SD 44.7 fl      MPV 11.0 fL      Platelets 221 10*3/mm3      Neutrophil % 49.8 %      Lymphocyte % 34.7 %      Monocyte % 8.4 %      Eosinophil % 5.5 %      Basophil % 1.2 %      Immature Grans % 0.4 %      Neutrophils, Absolute 3.45 10*3/mm3      Lymphocytes, Absolute 2.40 10*3/mm3      Monocytes, Absolute 0.58 10*3/mm3      Eosinophils, Absolute 0.38 10*3/mm3      Basophils, Absolute 0.08 10*3/mm3      Immature Grans, Absolute 0.03 10*3/mm3      nRBC 0.0 /100 WBC            EKG:   No orders to display       Current Medications:     Current Facility-Administered Medications:   •  albuterol sulfate HFA (PROVENTIL HFA;VENTOLIN HFA;PROAIR HFA) inhaler 2 puff, 2 puff, Inhalation, Q6H PRN, Perrenhumbertod, Goldie, DO  •  HYDROmorphone (DILAUDID) injection 1 mg, 1 mg, Intravenous, Q2H PRN, Perrenoud, Goldie, DO  •  lactated ringers infusion, 100 mL/hr, Intravenous, Continuous, Perpandad, Goldie, DO, Last Rate: 100 mL/hr at 04/12/23 1437, 100 mL/hr at 04/12/23 1437  •  levoFLOXacin (LEVAQUIN) 500 mg/100 mL D5W (premix) (LEVAQUIN) 500 mg, 500 mg, Intravenous, Q24H, Perrenhumbertod, Goldie, DO, Stopped at 04/12/23 0340  •  ondansetron (ZOFRAN) injection 4 mg, 4 mg, Intravenous, Q6H PRN, Perrenoud, Goldie, DO  •  pantoprazole (PROTONIX) EC tablet 40 mg, 40 mg, Oral, Daily, Goldie Mandujano DO, 40 mg at 04/12/23 1433     Imaging results:  Imaging Results (Last 72 Hours)     Procedure Component Value Units Date/Time    FL Cholangiogram Operative [[id number]] Collected: 04/12/23 1303     Updated: 04/12/23  1307    Narrative:      FL CHOLANGIOGRAM OPERATIVE-: 4/12/2023 12:13 PM     INDICATION:   Cholecystitis.  Intraoperative cholangiogram.     FINDINGS:   4 fluoroscopic view(s) of the right upper quadrant during an  intraoperative cholangiogram were submitted for review . The common bile  duct appears mildly dilated. No filling defects are seen within it.  Please see the operative report.     Fluoroscopic time: 33 seconds. Dose: 16 mGy.     This report was finalized on 4/12/2023 1:05 PM by Dr. James Daniel MD.       CT Abdomen Pelvis With Contrast [[id number]] Collected: 04/12/23 0101     Updated: 04/12/23 0103    Narrative:      CT Abdomen Pelvis W    INDICATION:   Right upper quadrant pain    TECHNIQUE:   CT of the abdomen and pelvis with IV contrast. Coronal and sagittal reconstructions were obtained.  Radiation dose reduction techniques included automated exposure control or exposure modulation based on body size. Radiation audit for CT and nuclear  cardiology exams in the last 12 months: 0.     COMPARISON:   None available.    FINDINGS:    The visualized lung bases are clear. No destructive osseous lesion.    Favor subtle cholelithiasis near the gallbladder neck. Gallbladder is moderately distended and would favor subtle gallbladder wall thickening. Early changes of cholecystitis could have this appearance but there is no biliary dilatation. Normal appearance  of the liver, pancreas, spleen and both adrenal glands.    The kidneys enhance symmetrically. No worrisome renal lesion. No hydronephrosis.    Uterus is present. No large adnexal mass.    No evidence of bowel obstruction. No localizing perienteric inflammatory change. Normal appendix.    Normal caliber of the abdominal aorta. No lymphadenopathy within the abdomen or pelvis by size criteria.      Impression:        1.  Favor subtle cholelithiasis near the gallbladder neck. Gallbladder is moderately distended and would favor subtle gallbladder wall  thickening. While nonspecific, early changes of cholecystitis could have this appearance but there is no biliary  dilatation.        Signer Name: MILADIS BLANK MD   Signed: 4/12/2023 1:01 AM   Workstation Name: KENA    [facility]           Ambulatory status:   - as tolerated    Social issues:   Social History     Socioeconomic History   • Marital status: Significant Other   Tobacco Use   • Smoking status: Every Day     Packs/day: 0.50     Types: Cigarettes   Vaping Use   • Vaping Use: Some days   • Substances: Nicotine, THC, Flavoring   • Devices: Disposable   Substance and Sexual Activity   • Alcohol use: Not Currently   • Drug use: Yes     Frequency: 7.0 times per week     Types: Marijuana     Comment: hemp   • Sexual activity: Defer       NIH Stroke Scale:         Mana Brian RN  04/12/23 14:37 EDT

## 2023-04-12 NOTE — PLAN OF CARE
Goal Outcome Evaluation:  Plan of Care Reviewed With: patient        Progress: no change  Outcome Evaluation: Admitted from ER for pain, possible cholecystitis. No complaints of pain or discomfort. Possible surgery today. Rested well during the night. VSS.

## 2023-04-12 NOTE — ANESTHESIA PREPROCEDURE EVALUATION
Anesthesia Evaluation     Patient summary reviewed and Nursing notes reviewed   NPO Solid Status: > 8 hours  NPO Liquid Status: > 8 hours           Airway   Mallampati: II  TM distance: >3 FB  Neck ROM: full  Large neck circumference and No difficulty expected  Dental - normal exam     Pulmonary - normal exam   (+) pulmonary embolism (2017- on Xarelto (last took it yesterday morning) ), asthma (inhaler use as needed; last yesterday),recent URI (x 1 week),   Cardiovascular - normal exam    (+) hyperlipidemia,       Neuro/Psych  (+) CVA (no residual symptoms; weakness on right side occured before stroke), weakness (right sided weakness (right arm and right leg)), psychiatric history Anxiety and Depression,    GI/Hepatic/Renal/Endo    (+) obesity, morbid obesity, GERD well controlled,  liver disease history of elevated LFT, renal disease stones,     Musculoskeletal     Abdominal  - normal exam   Substance History   (+) alcohol use (socially),      OB/GYN negative ob/gyn ROS     Comment: hysterectomy      Other   autoimmune disease (multifocal motor neuropathy) ,                    Anesthesia Plan    ASA 3     general     intravenous induction     Anesthetic plan, risks, benefits, and alternatives have been provided, discussed and informed consent has been obtained with: patient.  Pre-procedure education provided  Use of blood products discussed with patient  Consented to blood products.   Plan discussed with CRNA.        CODE STATUS:

## 2023-04-13 ENCOUNTER — APPOINTMENT (OUTPATIENT)
Dept: GENERAL RADIOLOGY | Facility: HOSPITAL | Age: 38
DRG: 446 | End: 2023-04-13
Payer: COMMERCIAL

## 2023-04-13 ENCOUNTER — ANESTHESIA EVENT (OUTPATIENT)
Dept: GASTROENTEROLOGY | Facility: HOSPITAL | Age: 38
DRG: 446 | End: 2023-04-13
Payer: COMMERCIAL

## 2023-04-13 ENCOUNTER — READMISSION MANAGEMENT (OUTPATIENT)
Dept: CALL CENTER | Facility: HOSPITAL | Age: 38
End: 2023-04-13
Payer: COMMERCIAL

## 2023-04-13 ENCOUNTER — ANESTHESIA (OUTPATIENT)
Dept: GASTROENTEROLOGY | Facility: HOSPITAL | Age: 38
DRG: 446 | End: 2023-04-13
Payer: COMMERCIAL

## 2023-04-13 LAB
ALBUMIN SERPL-MCNC: 2.9 G/DL (ref 3.5–5.2)
ALBUMIN/GLOB SERPL: 0.6 G/DL
ALP SERPL-CCNC: 260 U/L (ref 39–117)
ALT SERPL W P-5'-P-CCNC: 569 U/L (ref 1–33)
ANION GAP SERPL CALCULATED.3IONS-SCNC: 6.8 MMOL/L (ref 5–15)
AST SERPL-CCNC: 283 U/L (ref 1–32)
BILIRUB SERPL-MCNC: 0.8 MG/DL (ref 0–1.2)
BUN SERPL-MCNC: 8 MG/DL (ref 6–20)
BUN/CREAT SERPL: 13.6 (ref 7–25)
CALCIUM SPEC-SCNC: 8.7 MG/DL (ref 8.6–10.5)
CHLORIDE SERPL-SCNC: 109 MMOL/L (ref 98–107)
CO2 SERPL-SCNC: 23.2 MMOL/L (ref 22–29)
CREAT SERPL-MCNC: 0.59 MG/DL (ref 0.57–1)
DEPRECATED RDW RBC AUTO: 37.9 FL (ref 37–54)
EGFRCR SERPLBLD CKD-EPI 2021: 119.2 ML/MIN/1.73
ERYTHROCYTE [DISTWIDTH] IN BLOOD BY AUTOMATED COUNT: 11.8 % (ref 12.3–15.4)
GLOBULIN UR ELPH-MCNC: 4.5 GM/DL
GLUCOSE SERPL-MCNC: 78 MG/DL (ref 65–99)
HCT VFR BLD AUTO: 37.7 % (ref 34–46.6)
HGB BLD-MCNC: 12.9 G/DL (ref 12–15.9)
MCH RBC QN AUTO: 30.5 PG (ref 26.6–33)
MCHC RBC AUTO-ENTMCNC: 34.2 G/DL (ref 31.5–35.7)
MCV RBC AUTO: 89.1 FL (ref 79–97)
PLATELET # BLD AUTO: 212 10*3/MM3 (ref 140–450)
PMV BLD AUTO: 10.6 FL (ref 6–12)
POTASSIUM SERPL-SCNC: 4 MMOL/L (ref 3.5–5.2)
PROT SERPL-MCNC: 7.4 G/DL (ref 6–8.5)
RBC # BLD AUTO: 4.23 10*6/MM3 (ref 3.77–5.28)
SODIUM SERPL-SCNC: 139 MMOL/L (ref 136–145)
WBC NRBC COR # BLD: 10.82 10*3/MM3 (ref 3.4–10.8)

## 2023-04-13 PROCEDURE — 25010000002 PROPOFOL 10 MG/ML EMULSION: Performed by: STUDENT IN AN ORGANIZED HEALTH CARE EDUCATION/TRAINING PROGRAM

## 2023-04-13 PROCEDURE — 0FC98ZZ EXTIRPATION OF MATTER FROM COMMON BILE DUCT, VIA NATURAL OR ARTIFICIAL OPENING ENDOSCOPIC: ICD-10-PCS | Performed by: INTERNAL MEDICINE

## 2023-04-13 PROCEDURE — 85027 COMPLETE CBC AUTOMATED: CPT | Performed by: SURGERY

## 2023-04-13 PROCEDURE — 43262 ENDO CHOLANGIOPANCREATOGRAPH: CPT | Performed by: INTERNAL MEDICINE

## 2023-04-13 PROCEDURE — 25010000002 HYDROMORPHONE PER 4 MG: Performed by: SURGERY

## 2023-04-13 PROCEDURE — 25510000001 IOPAMIDOL 61 % SOLUTION: Performed by: INTERNAL MEDICINE

## 2023-04-13 PROCEDURE — BF10YZZ FLUOROSCOPY OF BILE DUCTS USING OTHER CONTRAST: ICD-10-PCS | Performed by: INTERNAL MEDICINE

## 2023-04-13 PROCEDURE — 25010000002 ONDANSETRON PER 1 MG: Performed by: STUDENT IN AN ORGANIZED HEALTH CARE EDUCATION/TRAINING PROGRAM

## 2023-04-13 PROCEDURE — C1769 GUIDE WIRE: HCPCS | Performed by: INTERNAL MEDICINE

## 2023-04-13 PROCEDURE — 25010000002 LEVOFLOXACIN PER 250 MG: Performed by: INTERNAL MEDICINE

## 2023-04-13 PROCEDURE — 88305 TISSUE EXAM BY PATHOLOGIST: CPT | Performed by: INTERNAL MEDICINE

## 2023-04-13 PROCEDURE — 74328 X-RAY BILE DUCT ENDOSCOPY: CPT

## 2023-04-13 PROCEDURE — 99254 IP/OBS CNSLTJ NEW/EST MOD 60: CPT | Performed by: INTERNAL MEDICINE

## 2023-04-13 PROCEDURE — 43264 ERCP REMOVE DUCT CALCULI: CPT | Performed by: INTERNAL MEDICINE

## 2023-04-13 PROCEDURE — 74328 X-RAY BILE DUCT ENDOSCOPY: CPT | Performed by: INTERNAL MEDICINE

## 2023-04-13 PROCEDURE — S0260 H&P FOR SURGERY: HCPCS | Performed by: SURGERY

## 2023-04-13 PROCEDURE — 25010000002 DEXAMETHASONE SODIUM PHOSPHATE 20 MG/5ML SOLUTION: Performed by: STUDENT IN AN ORGANIZED HEALTH CARE EDUCATION/TRAINING PROGRAM

## 2023-04-13 PROCEDURE — 25010000002 HYDROMORPHONE PER 4 MG: Performed by: INTERNAL MEDICINE

## 2023-04-13 PROCEDURE — 43273 ENDOSCOPIC PANCREATOSCOPY: CPT | Performed by: INTERNAL MEDICINE

## 2023-04-13 PROCEDURE — 80053 COMPREHEN METABOLIC PANEL: CPT | Performed by: SURGERY

## 2023-04-13 PROCEDURE — 0DB68ZX EXCISION OF STOMACH, VIA NATURAL OR ARTIFICIAL OPENING ENDOSCOPIC, DIAGNOSTIC: ICD-10-PCS | Performed by: INTERNAL MEDICINE

## 2023-04-13 RX ORDER — HYDROCODONE BITARTRATE AND ACETAMINOPHEN 7.5; 325 MG/1; MG/1
1 TABLET ORAL ONCE AS NEEDED
Status: DISCONTINUED | OUTPATIENT
Start: 2023-04-13 | End: 2023-04-13

## 2023-04-13 RX ORDER — ONDANSETRON 2 MG/ML
INJECTION INTRAMUSCULAR; INTRAVENOUS AS NEEDED
Status: DISCONTINUED | OUTPATIENT
Start: 2023-04-13 | End: 2023-04-13 | Stop reason: SURG

## 2023-04-13 RX ORDER — PROMETHAZINE HYDROCHLORIDE 25 MG/1
25 SUPPOSITORY RECTAL ONCE AS NEEDED
Status: DISCONTINUED | OUTPATIENT
Start: 2023-04-13 | End: 2023-04-13

## 2023-04-13 RX ORDER — NALOXONE HCL 0.4 MG/ML
0.2 VIAL (ML) INJECTION AS NEEDED
Status: DISCONTINUED | OUTPATIENT
Start: 2023-04-13 | End: 2023-04-13

## 2023-04-13 RX ORDER — HYDROMORPHONE HCL 110MG/55ML
0.5 PATIENT CONTROLLED ANALGESIA SYRINGE INTRAVENOUS
Status: DISCONTINUED | OUTPATIENT
Start: 2023-04-13 | End: 2023-04-13

## 2023-04-13 RX ORDER — DROPERIDOL 2.5 MG/ML
0.62 INJECTION, SOLUTION INTRAMUSCULAR; INTRAVENOUS
Status: DISCONTINUED | OUTPATIENT
Start: 2023-04-13 | End: 2023-04-13

## 2023-04-13 RX ORDER — LIDOCAINE HYDROCHLORIDE 20 MG/ML
INJECTION, SOLUTION INFILTRATION; PERINEURAL AS NEEDED
Status: DISCONTINUED | OUTPATIENT
Start: 2023-04-13 | End: 2023-04-13 | Stop reason: SURG

## 2023-04-13 RX ORDER — ONDANSETRON 2 MG/ML
4 INJECTION INTRAMUSCULAR; INTRAVENOUS ONCE AS NEEDED
Status: DISCONTINUED | OUTPATIENT
Start: 2023-04-13 | End: 2023-04-13

## 2023-04-13 RX ORDER — FENTANYL CITRATE 50 UG/ML
50 INJECTION, SOLUTION INTRAMUSCULAR; INTRAVENOUS
Status: DISCONTINUED | OUTPATIENT
Start: 2023-04-13 | End: 2023-04-13

## 2023-04-13 RX ORDER — INDOMETHACIN 100 MG
SUPPOSITORY, RECTAL RECTAL AS NEEDED
Status: DISCONTINUED | OUTPATIENT
Start: 2023-04-13 | End: 2023-04-13 | Stop reason: HOSPADM

## 2023-04-13 RX ORDER — OXYCODONE AND ACETAMINOPHEN 7.5; 325 MG/1; MG/1
1 TABLET ORAL EVERY 4 HOURS PRN
Status: DISCONTINUED | OUTPATIENT
Start: 2023-04-13 | End: 2023-04-13

## 2023-04-13 RX ORDER — LEVOFLOXACIN 5 MG/ML
500 INJECTION, SOLUTION INTRAVENOUS ONCE
Status: COMPLETED | OUTPATIENT
Start: 2023-04-13 | End: 2023-04-13

## 2023-04-13 RX ORDER — ROCURONIUM BROMIDE 10 MG/ML
INJECTION, SOLUTION INTRAVENOUS AS NEEDED
Status: DISCONTINUED | OUTPATIENT
Start: 2023-04-13 | End: 2023-04-13 | Stop reason: SURG

## 2023-04-13 RX ORDER — IPRATROPIUM BROMIDE AND ALBUTEROL SULFATE 2.5; .5 MG/3ML; MG/3ML
3 SOLUTION RESPIRATORY (INHALATION) ONCE AS NEEDED
Status: DISCONTINUED | OUTPATIENT
Start: 2023-04-13 | End: 2023-04-13

## 2023-04-13 RX ORDER — FLUMAZENIL 0.1 MG/ML
0.2 INJECTION INTRAVENOUS AS NEEDED
Status: DISCONTINUED | OUTPATIENT
Start: 2023-04-13 | End: 2023-04-13

## 2023-04-13 RX ORDER — PANTOPRAZOLE SODIUM 40 MG/1
40 TABLET, DELAYED RELEASE ORAL
Status: DISCONTINUED | OUTPATIENT
Start: 2023-04-14 | End: 2023-04-14 | Stop reason: HOSPADM

## 2023-04-13 RX ORDER — EPHEDRINE SULFATE 50 MG/ML
5 INJECTION, SOLUTION INTRAVENOUS ONCE AS NEEDED
Status: DISCONTINUED | OUTPATIENT
Start: 2023-04-13 | End: 2023-04-13

## 2023-04-13 RX ORDER — LABETALOL HYDROCHLORIDE 5 MG/ML
5 INJECTION, SOLUTION INTRAVENOUS
Status: DISCONTINUED | OUTPATIENT
Start: 2023-04-13 | End: 2023-04-13

## 2023-04-13 RX ORDER — PROMETHAZINE HYDROCHLORIDE 25 MG/1
25 TABLET ORAL ONCE AS NEEDED
Status: DISCONTINUED | OUTPATIENT
Start: 2023-04-13 | End: 2023-04-13

## 2023-04-13 RX ORDER — SODIUM CHLORIDE, SODIUM LACTATE, POTASSIUM CHLORIDE, CALCIUM CHLORIDE 600; 310; 30; 20 MG/100ML; MG/100ML; MG/100ML; MG/100ML
INJECTION, SOLUTION INTRAVENOUS CONTINUOUS PRN
Status: DISCONTINUED | OUTPATIENT
Start: 2023-04-13 | End: 2023-04-13 | Stop reason: SURG

## 2023-04-13 RX ORDER — DEXAMETHASONE SODIUM PHOSPHATE 4 MG/ML
INJECTION, SOLUTION INTRA-ARTICULAR; INTRALESIONAL; INTRAMUSCULAR; INTRAVENOUS; SOFT TISSUE AS NEEDED
Status: DISCONTINUED | OUTPATIENT
Start: 2023-04-13 | End: 2023-04-13 | Stop reason: SURG

## 2023-04-13 RX ORDER — ESMOLOL HYDROCHLORIDE 10 MG/ML
INJECTION INTRAVENOUS AS NEEDED
Status: DISCONTINUED | OUTPATIENT
Start: 2023-04-13 | End: 2023-04-13 | Stop reason: SURG

## 2023-04-13 RX ORDER — SODIUM CHLORIDE 9 MG/ML
30 INJECTION, SOLUTION INTRAVENOUS CONTINUOUS PRN
Status: DISCONTINUED | OUTPATIENT
Start: 2023-04-13 | End: 2023-04-14 | Stop reason: HOSPADM

## 2023-04-13 RX ORDER — PROPOFOL 10 MG/ML
VIAL (ML) INTRAVENOUS AS NEEDED
Status: DISCONTINUED | OUTPATIENT
Start: 2023-04-13 | End: 2023-04-13 | Stop reason: SURG

## 2023-04-13 RX ORDER — DIPHENHYDRAMINE HYDROCHLORIDE 50 MG/ML
12.5 INJECTION INTRAMUSCULAR; INTRAVENOUS
Status: DISCONTINUED | OUTPATIENT
Start: 2023-04-13 | End: 2023-04-13

## 2023-04-13 RX ORDER — HYDRALAZINE HYDROCHLORIDE 20 MG/ML
5 INJECTION INTRAMUSCULAR; INTRAVENOUS
Status: DISCONTINUED | OUTPATIENT
Start: 2023-04-13 | End: 2023-04-13

## 2023-04-13 RX ADMIN — SODIUM CHLORIDE 100 ML/HR: 9 INJECTION, SOLUTION INTRAVENOUS at 06:41

## 2023-04-13 RX ADMIN — SODIUM CHLORIDE 30 ML/HR: 9 INJECTION, SOLUTION INTRAVENOUS at 13:58

## 2023-04-13 RX ADMIN — ROCURONIUM BROMIDE 50 MG: 50 INJECTION INTRAVENOUS at 15:52

## 2023-04-13 RX ADMIN — DEXAMETHASONE SODIUM PHOSPHATE 8 MG: 4 INJECTION, SOLUTION INTRAMUSCULAR; INTRAVENOUS at 15:52

## 2023-04-13 RX ADMIN — HYDROMORPHONE HYDROCHLORIDE 0.5 MG: 1 INJECTION, SOLUTION INTRAMUSCULAR; INTRAVENOUS; SUBCUTANEOUS at 02:43

## 2023-04-13 RX ADMIN — HYDROMORPHONE HYDROCHLORIDE 0.5 MG: 1 INJECTION, SOLUTION INTRAMUSCULAR; INTRAVENOUS; SUBCUTANEOUS at 18:16

## 2023-04-13 RX ADMIN — HYDROMORPHONE HYDROCHLORIDE 0.5 MG: 1 INJECTION, SOLUTION INTRAMUSCULAR; INTRAVENOUS; SUBCUTANEOUS at 06:42

## 2023-04-13 RX ADMIN — SODIUM CHLORIDE 100 ML/HR: 9 INJECTION, SOLUTION INTRAVENOUS at 22:49

## 2023-04-13 RX ADMIN — PROPOFOL 200 MG: 10 INJECTION, EMULSION INTRAVENOUS at 15:52

## 2023-04-13 RX ADMIN — SODIUM CHLORIDE, POTASSIUM CHLORIDE, SODIUM LACTATE AND CALCIUM CHLORIDE: 600; 310; 30; 20 INJECTION, SOLUTION INTRAVENOUS at 15:45

## 2023-04-13 RX ADMIN — ESMOLOL HYDROCHLORIDE 30 MG: 100 INJECTION, SOLUTION INTRAVENOUS at 15:52

## 2023-04-13 RX ADMIN — ONDANSETRON 4 MG: 2 INJECTION INTRAMUSCULAR; INTRAVENOUS at 16:27

## 2023-04-13 RX ADMIN — BUPROPION HYDROCHLORIDE 300 MG: 300 TABLET, EXTENDED RELEASE ORAL at 09:16

## 2023-04-13 RX ADMIN — HYDROMORPHONE HYDROCHLORIDE 0.5 MG: 1 INJECTION, SOLUTION INTRAMUSCULAR; INTRAVENOUS; SUBCUTANEOUS at 22:18

## 2023-04-13 RX ADMIN — SUGAMMADEX 200 MG: 100 INJECTION, SOLUTION INTRAVENOUS at 16:27

## 2023-04-13 RX ADMIN — LEVOFLOXACIN 500 MG: 5 INJECTION, SOLUTION INTRAVENOUS at 16:00

## 2023-04-13 RX ADMIN — HYDROMORPHONE HYDROCHLORIDE 0.5 MG: 1 INJECTION, SOLUTION INTRAMUSCULAR; INTRAVENOUS; SUBCUTANEOUS at 10:40

## 2023-04-13 RX ADMIN — LIDOCAINE HYDROCHLORIDE 50 MG: 20 INJECTION, SOLUTION INFILTRATION; PERINEURAL at 15:52

## 2023-04-13 RX ADMIN — Medication 10 ML: at 22:20

## 2023-04-13 RX ADMIN — DULOXETINE HYDROCHLORIDE 30 MG: 30 CAPSULE, DELAYED RELEASE ORAL at 09:16

## 2023-04-13 NOTE — NURSING NOTE
Patient has all four bed rails up. Educated patient on policy that it is considered a restraint. Patient understands and has requested that all four bed rails stay in the upright position.

## 2023-04-13 NOTE — ANESTHESIA PREPROCEDURE EVALUATION
Anesthesia Evaluation     NPO Solid Status: > 8 hours  NPO Liquid Status: > 8 hours           Airway   Mallampati: I  TM distance: >3 FB  No difficulty expected  Dental      Pulmonary    (+) pulmonary embolism, a smoker Current, asthma,  Cardiovascular   Exercise tolerance: good (4-7 METS)    (+) hyperlipidemia,       Neuro/Psych  (+) CVA, psychiatric history,    GI/Hepatic/Renal/Endo    (+) morbid obesity,      Musculoskeletal     Abdominal    Substance History   (+) drug use     OB/GYN          Other                        Anesthesia Plan    ASA 3     general     intravenous induction     Anesthetic plan, risks, benefits, and alternatives have been provided, discussed and informed consent has been obtained with: patient.    Plan discussed with CRNA.        CODE STATUS:

## 2023-04-13 NOTE — CONSULTS
CC: abdominal pain      Delphine King is a 37 y.o. female who presents from OSH after lap CCY revealed positive IOC    HPI  Mrs. King is a 37 yoF who presents from OSH after lap CCY revealed positive IOC.  She has h/o anxiety, depression, HLD.  Labs notable for elevated AST/ALT and ALP with normal bilirubin, normal lipase.  WBC 10 today following CCY yesterday.  CT abd/pelvis prior to CCY showed cholelithiasis and concern for early changes of cholecystitis without biliary dilatation.  IOC appears to show distal filling defect with obstruction of flow of contrast.  She reports some soreness this afternoon following surgery yesterday.  No current nausea, emesis.  No fever, chills.   Past Medical History:   Diagnosis Date   • Anxiety    • Asthma    • Depression    • Hyperlipidemia    • MMN (multifocal motor neuropathy)    • Neuropathy    • PE (pulmonary thromboembolism)    • Stroke        Past Surgical History:   Procedure Laterality Date   • ANAL FISTULA REPAIR     •  SECTION     • HYSTERECTOMY     • PORTACATH PLACEMENT Right     power port   • TONSILLECTOMY     • WISDOM TOOTH EXTRACTION           Current Facility-Administered Medications:   •  buPROPion XL (WELLBUTRIN XL) 24 hr tablet 300 mg, 300 mg, Oral, Daily, Rober Leach MD, 300 mg at 23 0916  •  DULoxetine (CYMBALTA) DR capsule 30 mg, 30 mg, Oral, Daily, Rober Leach MD, 30 mg at 23 0916  •  HYDROmorphone (DILAUDID) injection 0.5 mg, 0.5 mg, Intravenous, Q2H PRN, 0.5 mg at 23 0642 **AND** naloxone (NARCAN) injection 0.4 mg, 0.4 mg, Intravenous, Q5 Min PRN, Rober Leach MD  •  ondansetron (ZOFRAN) injection 4 mg, 4 mg, Intravenous, Q6H PRN, Rober Leach MD  •  sodium chloride 0.9 % flush 10 mL, 10 mL, Intravenous, Q12H, Rober Leach MD, 10 mL at 23  •  sodium chloride 0.9 % flush 10 mL, 10 mL, Intravenous, PRN, Rober Leach MD  •  sodium chloride 0.9 % infusion 40 mL, 40 mL, Intravenous, PRN,  Rober Leach MD  •  sodium chloride 0.9 % infusion, 100 mL/hr, Intravenous, Continuous, Rober Leach MD, Last Rate: 100 mL/hr at 04/13/23 0641, 100 mL/hr at 04/13/23 0641    Allergies   Allergen Reactions   • Penicillins Hives and Shortness Of Breath   • Pineapple Hives, Anaphylaxis, Itching and Shortness Of Breath   • Pseudoeph-Doxylamine-Dm-Apap Hives   • Shellfish-Derived Products Anaphylaxis, Hives, Itching and Shortness Of Breath   • Vancomycin Itching       Social History     Socioeconomic History   • Marital status: Significant Other   Tobacco Use   • Smoking status: Every Day     Packs/day: 0.50     Types: Cigarettes   Vaping Use   • Vaping Use: Some days   • Substances: Nicotine, THC, Flavoring   • Devices: Disposable   Substance and Sexual Activity   • Alcohol use: Not Currently   • Drug use: Yes     Frequency: 7.0 times per week     Types: Marijuana     Comment: hemp   • Sexual activity: Defer       History reviewed. No pertinent family history.    Review of Systems   Constitutional: Negative for chills and fever.   Respiratory: Negative for cough and shortness of breath.    Gastrointestinal: Positive for abdominal pain. Negative for abdominal distention, nausea and vomiting.   Skin: Negative for color change and rash.   All other systems reviewed and are negative.      Vitals:    04/13/23 1000   BP: 110/67   Pulse: 64   Resp: 16   Temp: 97.8 °F (36.6 °C)   SpO2: 95%       Physical Exam:   General: Patient awake, alert and cooperative   Eyes: Normal lids and lashes, no scleral icterus   Neck: Supple, normal ROM   Skin: Warm and dry, not jaundiced   Cardiovascular: Regular rate, regular rhythm, well-perfused extremities   Pulm: Equal expansion bilaterally, no increased WOB   Abdomen: Soft, RENU drain draining serosanguinous fluid   Extremities: No rash or edema              Neuro: A&O, o obvious sign of focal deficit   Psychiatric: Normal mood and behavior; memory intact    CT Abdomen Pelvis With  Contrast    Result Date: 4/12/2023  1.  Favor subtle cholelithiasis near the gallbladder neck. Gallbladder is moderately distended and would favor subtle gallbladder wall thickening. While nonspecific, early changes of cholecystitis could have this appearance but there is no biliary dilatation. Signer Name: MILADIS BLANK MD  Signed: 4/12/2023 1:01 AM  Workstation Name: [id number] Cholangiogram Operative    Addendum Date: 4/12/2023    The case has been discussed with Dr. Mandujano. She notes that there is a stone in the distal common bile duct, and this is blocking out flow into the duodenum. This accounts for the dilated common bile duct noted in the initial report.  This report was finalized on 4/12/2023 2:36 PM by Dr. James Daniel MD.        Impression:  Abdominal Pain  Cholecystitis  Choledocholithiasis  Elevated Liver Enzymes    Plan:  - Labs notable for elevated AST/ALT and ALP with normal bilirubin, normal lipase.  WBC 10 today following CCY yesterday.  CT abd/pelvis prior to CCY showed cholelithiasis and concern for early changes of cholecystitis without biliary dilatation.  IOC appears to show distal filling defect with obstruction of flow of contrast.  - NPO   - Plan for ERCP today  - Discussed risks/benefits with patient in full today with risks including but not limited to bleeding, perforation, pancreatitis, infection, risk with anesthesia/sedation, etc.     Mitch Barclay MD

## 2023-04-13 NOTE — ANESTHESIA POSTPROCEDURE EVALUATION
"Patient: Delphine King    Procedure Summary     Date: 04/13/23 Room / Location:  [facility] /  [facility]    Anesthesia Start: 1537 Anesthesia Stop: 1645    Procedures:       ESOPHAGOGASTRODUODENOSCOPY WITH COLD BX'S (Esophagus)      ENDOSCOPIC RETROGRADE CHOLANGIOPANCREATOGRAPHY WITH SPHINCTEROTOMY AND BALOON SWEEP Diagnosis:       Choledocholithiasis with chronic cholecystitis      (Choledocholithiasis with chronic cholecystitis [K80.44])    Surgeons: Mitch Barclay MD Provider: Shahid Naylor MD    Anesthesia Type: general ASA Status: 3          Anesthesia Type: general    Vitals  Vitals Value Taken Time   /57 04/13/23 1658   Temp 36.7 °C (98.1 °F) 04/13/23 1652   Pulse 63 04/13/23 1658   Resp 15 04/13/23 1658   SpO2 95 % 04/13/23 1658           Post Anesthesia Care and Evaluation    Patient location during evaluation: bedside  Patient participation: complete - patient participated  Level of consciousness: awake and alert  Pain score: 0  Pain management: adequate    Airway patency: patent  Anesthetic complications: No anesthetic complications    Cardiovascular status: acceptable  Respiratory status: acceptable  Hydration status: acceptable    Comments: /70 (BP Location: Left arm, Patient Position: Lying)   Pulse 57   Temp 36.2 °C (97.2 °F) (Oral)   Resp 16   Ht 170.2 cm (67\")   Wt 113 kg (250 lb)   SpO2 94%   BMI 39.16 kg/m²       "

## 2023-04-13 NOTE — CASE MANAGEMENT/SOCIAL WORK
Case Management Discharge Note      Final Note: Dischargd home.    Provided Post Acute Provider List?: Refused  Refused Provider List Comment: pt declined    Selected Continued Care - Discharged on 4/12/2023 Admission date: 4/11/2023 - Discharge disposition: Home or Self Care    Destination    No services have been selected for the patient.              Durable Medical Equipment    No services have been selected for the patient.              Dialysis/Infusion    No services have been selected for the patient.              Home Medical Care    No services have been selected for the patient.              Therapy    No services have been selected for the patient.              Community Resources    No services have been selected for the patient.              Community & DME    No services have been selected for the patient.                       Final Discharge Disposition Code: 01 - home or self-care

## 2023-04-13 NOTE — PLAN OF CARE
Goal Outcome Evaluation:  Patient is a pleasant woman. Significant other is at bedside. Has 4 lap sites and a RENU drain. She has been strict NPO since MN for an ERCP later this morning. Receiving IVF and IV pain medication. VSS. Call light is within reach. Will continue to monitor.

## 2023-04-13 NOTE — PAYOR COMM NOTE
"Basil Palm (37 y.o. Female)       ATTN: REQUESTING INPATIENT AUTHORIZATION:  ID# [id number]    PLEASE REPLY TO UR DEPT:  [phone],  [phone]    [facility]: NPI [id number]  [facility]# [id number]      Date of Birth   1985    Social Security Number       Address   [address] [place] [address]    Home Phone   [phone]    MRN   [id number]       Spiritism   None    Marital Status   Significant Other                            Admission Date   4/12/23    Admission Type   Urgent    Admitting Provider   Rober Leach MD    Attending Provider   Rober Leach MD    Department, Room/Bed   [facility] ENDO SUITES, ENDO/ENDO       Discharge Date       Discharge Disposition       Discharge Destination                               Attending Provider: Rober Leach MD    Allergies: Penicillins, Pineapple, Pseudoeph-doxylamine-dm-apap, Shellfish-derived Products, Vancomycin    Isolation: None   Infection: None   Code Status: Not on file    Ht: 170.2 cm (67\")   Wt: 113 kg (250 lb)    Admission Cmt: None   Principal Problem: Choledocholithiasis with chronic cholecystitis [K80.44]                 Active Insurance as of 4/12/2023     Primary Coverage     Payor Plan Insurance Group Employer/Plan Group    [facility] BY [facility] BY ZAYDA [id number]     Payor Plan Address Payor Plan Phone Number Payor Plan Fax Number Effective Dates    [address] [address]   12/1/2021 - None Entered    [facility] [id number]       Subscriber Name Subscriber Birth Date Member ID       BASIL PALM 1985 [id number]                 Emergency Contacts      (Rel.) Home Phone Work Phone Mobile Phone    EVELIO MCGREGOR (Partner) -- -- [phone]               History & Physical      Rober Leach MD at 04/13/23 0833        ASSESSMENT/PLAN:    37-year-old lady who underwent laparoscopic cholecystectomy with cholangiogram yesterday and was found " to have choledocholithiasis.  She was transferred here to [facility] for ERCP.  Dr. Barclay and Alannah consulted.  Procedure to be performed today.    CC:     Choledocholithiasis    HPI:    37-year-old lady who underwent laparoscopic cholecystectomy with cholangiogram yesterday and was found to have choledocholithiasis.  Relevant review of systems negative other than presenting complaints.    SOCIAL HISTORY:   • 0.5 pack per day tobacco use  • Occasional alcohol use    FAMILY HISTORY:    • Colorectal cancer: negative    PREVIOUS ABDOMINAL SURGERY    • Laparoscopic cholecystectomy with cholangiogram 2023  •   • Hysterectomy     PAST MEDICAL HISTORY:    • Anxiety/depression  • Hyperlipidemia   • Stroke     MEDICATIONS:   • Reviewed    ALLERGIES:   • Reviewed    PHYSICAL EXAM:   • Constitutional: Well-developed well-nourished, no acute distress  • Respiratory: Normal nonlabored inspiratory effort  • Cardiovascular: Regular rate, no jugular venous distention  • Gastrointestinal: Soft, appropriately tender  • Psychiatric: Alert and oriented ×3, normal affect     ROBER CRUZ M.D.      Electronically signed by Rober Cruz MD at 23 0836       Emergency Department Notes    No notes of this type exist for this encounter.         Vital Signs (last 2 days)     Date/Time Temp Temp src Pulse Resp BP Patient Position SpO2    23 1353 -- -- 63 16 117/62 Lying 95    23 1000 97.8 (36.6) Oral 64 16 110/67 Lying 95    23 0512 97 (36.1) Oral 56 16 105/66 Lying 93    23 0200 97.1 (36.2) Oral 60 16 106/77 Lying 96    23 2330 -- -- -- -- -- -- 95    23 2325 -- -- -- -- -- -- 84    23 2143 97.8 (36.6) Oral 56 16 113/71 Lying 98    23 1800 97.3 (36.3) Oral 61 18 103/58 Lying 96    23 1632 97.6 (36.4) Oral -- -- -- -- --        Oxygen Therapy (last 2 days)     Date/Time SpO2 Device (Oxygen Therapy) Flow (L/min) Oxygen Concentration (%) ETCO2 (mmHg)    23 1353  95 room air -- -- --    04/13/23 1000 95 room air -- -- --    04/13/23 0512 93 nasal cannula 2 -- --    04/13/23 0200 96 nasal cannula 2 -- --    04/12/23 2330 95 nasal cannula 2 -- --    04/12/23 2325 84 room air -- -- --    04/12/23 2143 98 room air -- -- --    04/12/23 2000 -- room air -- -- --    04/12/23 1800 96 room air -- -- --        Intake & Output (last 2 days)       04/11 0701 04/12 0700 04/12 0701 04/13 0700 04/13 0701 04/14 0700    P.O.  240     Total Intake(mL/kg)  240 (2.1)     Urine (mL/kg/hr)   0 (0)    Drains   75    Total Output   75    Net  +240 -75           Urine Unmeasured Occurrence  2 x 1 x        Lines, Drains & Airways     Active LDAs     Name Placement date Placement time Site Days    Peripheral IV 04/11/23 2333 Left Antecubital 04/11/23 2333  Antecubital  1    Closed/Suction Drain 1 Abdomen Bulb 04/12/23  1219  Abdomen  1                  Current Facility-Administered Medications   Medication Dose Route Frequency Provider Last Rate Last Admin   • buPROPion XL (WELLBUTRIN XL) 24 hr tablet 300 mg  300 mg Oral Daily Rober Leach MD   300 mg at 04/13/23 0916   • DULoxetine (CYMBALTA) DR capsule 30 mg  30 mg Oral Daily Rober Leach MD   30 mg at 04/13/23 0916   • HYDROmorphone (DILAUDID) injection 0.5 mg  0.5 mg Intravenous Q2H PRN Rober Leach MD   0.5 mg at 04/13/23 1040    And   • naloxone (NARCAN) injection 0.4 mg  0.4 mg Intravenous Q5 Min PRN oRber Leach MD       • ondansetron (ZOFRAN) injection 4 mg  4 mg Intravenous Q6H PRN Rober Leach MD       • sodium chloride 0.9 % flush 10 mL  10 mL Intravenous Q12H Rober Leach MD   10 mL at 04/12/23 2031   • sodium chloride 0.9 % flush 10 mL  10 mL Intravenous PRN Rober Leach MD       • sodium chloride 0.9 % infusion 40 mL  40 mL Intravenous PRN Rober Leach MD       • sodium chloride 0.9 % infusion  100 mL/hr Intravenous Continuous Rober Leach  mL/hr at 04/13/23 0641 100 mL/hr at  04/13/23 0641   • sodium chloride 0.9 % infusion  30 mL/hr Intravenous Continuous PRN Mitch Barclay MD 30 mL/hr at 04/13/23 1358 30 mL/hr at 04/13/23 1358       Lab Results (last 48 hours)     Procedure Component Value Units Date/Time    Comprehensive Metabolic Panel [[id number]]  (Abnormal) Collected: 04/13/23 0534    Specimen: Blood Updated: 04/13/23 0645     Glucose 78 mg/dL      BUN 8 mg/dL      Creatinine 0.59 mg/dL      Sodium 139 mmol/L      Potassium 4.0 mmol/L      Chloride 109 mmol/L      CO2 23.2 mmol/L      Calcium 8.7 mg/dL      Total Protein 7.4 g/dL      Albumin 2.9 g/dL      ALT (SGPT) 569 U/L      AST (SGOT) 283 U/L      Alkaline Phosphatase 260 U/L      Total Bilirubin 0.8 mg/dL      Globulin 4.5 gm/dL      A/G Ratio 0.6 g/dL      BUN/Creatinine Ratio 13.6     Anion Gap 6.8 mmol/L      eGFR 119.2 mL/min/1.73     Narrative:      GFR Normal >60  Chronic Kidney Disease <60  Kidney Failure <15      CBC (No Diff) [[id number]]  (Abnormal) Collected: 04/13/23 0534    Specimen: Blood Updated: 04/13/23 0608     WBC 10.82 10*3/mm3      RBC 4.23 10*6/mm3      Hemoglobin 12.9 g/dL      Hematocrit 37.7 %      MCV 89.1 fL      MCH 30.5 pg      MCHC 34.2 g/dL      RDW 11.8 %      RDW-SD 37.9 fl      MPV 10.6 fL      Platelets 212 10*3/mm3           Orders (last 48 hrs)      Start     Ordered    04/13/23 1337  Implement Anesthesia orders day of procedure.  Once         04/13/23 1336    04/13/23 1337  Obtain informed consent  Once         04/13/23 1336    04/13/23 1336  sodium chloride 0.9 % infusion  Continuous PRN         04/13/23 1336    04/13/23 1332  ERCP  Once         04/13/23 1331    04/13/23 1038  FL ercp biliary duct only  1 Time Imaging         04/13/23 1037    04/13/23 0812  Case Request  Once         04/13/23 0812    04/13/23 0600  CBC (No Diff)  Morning Draw         04/12/23 1710    04/13/23 0600  Comprehensive Metabolic Panel  Morning Draw         04/12/23 1710    04/13/23 0001  NPO Diet NPO Type: Sips  with Meds  Diet Effective Midnight,   Status:  Canceled         04/12/23 1710    04/13/23 0001  NPO Diet NPO Type: Strict NPO  Diet Effective Midnight         04/12/23 1752    04/12/23 2100  sodium chloride 0.9 % flush 10 mL  Every 12 Hours Scheduled         04/12/23 1710    04/12/23 2000  Vital Signs  Every 4 Hours       04/12/23 1710 04/12/23 1859  Initiate & Follow Hypercapnic Monitoring Guideline for Opioid Administration via EtCO2 and / or SpO2  Continuous        Comments: Follow Hypercapnic Monitoring Guideline As Outlined in Process Instructions (Open Order Report to View Full Instructions)    04/12/23 1858 04/12/23 1859  Opioid Administration - Document EtCO2 and / or SpO2 With Each Set of Vitals & Any Change in Patient Status  Per Order Details        Comments: With Each Set of Vitals & Any Change in Patient Status    04/12/23 1858    04/12/23 1859  Opioid Administration - Notify Provider Hypercapnic Monitoring  Until Discontinued         04/12/23 1858 04/12/23 1859  Opioid Administration - Continuous Pulse Oximetry (SpO2)  Continuous         04/12/23 1858 04/12/23 1800  DULoxetine (CYMBALTA) DR capsule 30 mg  Daily         04/12/23 1710    04/12/23 1800  buPROPion XL (WELLBUTRIN XL) 24 hr tablet 300 mg  Daily         04/12/23 1710    04/12/23 1800  Oral Care  2 Times Daily       04/12/23 1710    04/12/23 1800  sodium chloride 0.9 % infusion  Continuous         04/12/23 1710 04/12/23 1711  Inpatient Gastroenterology Consult  Once        Specialty:  Gastroenterology  Provider:  Mitch Barclay MD    04/12/23 1710 04/12/23 1710  Diet: Liquid Diets; Clear Liquid; Texture: Regular Texture (IDDSI 7); Fluid Consistency: Thin (IDDSI 0)  Diet Effective Now,   Status:  Canceled         04/12/23 1710    04/12/23 1709  ondansetron (ZOFRAN) injection 4 mg  Every 6 Hours PRN         04/12/23 1710    04/12/23 1709  HYDROmorphone (DILAUDID) injection 0.5 mg  Every 2 Hours PRN        See Hyperspace for  full Linked Orders Report.    04/12/23 1710    04/12/23 1709  naloxone (NARCAN) injection 0.4 mg  Every 5 Minutes PRN        See Hyperspace for full Linked Orders Report.    04/12/23 1710    04/12/23 1709  Place Sequential Compression Device  Once         04/12/23 1710    04/12/23 1709  Maintain Sequential Compression Device  Continuous         04/12/23 1710    04/12/23 1709  Activity - Ad Marie  Until Discontinued         04/12/23 1710    04/12/23 1708  Intake & Output  Every Shift       04/12/23 1710    04/12/23 1708  Weigh Patient  Once         04/12/23 1710    04/12/23 1708  Oxygen Therapy- Nasal Cannula; Titrate for SPO2: 90% - 95%  Continuous         04/12/23 1710    04/12/23 1708  Insert Peripheral IV  Once         04/12/23 1710    04/12/23 1708  Saline Lock & Maintain IV Access  Continuous         04/12/23 1710    04/12/23 1708  Inpatient Admission  Once         04/12/23 1710    04/12/23 1707  sodium chloride 0.9 % flush 10 mL  As Needed         04/12/23 1710    04/12/23 1707  sodium chloride 0.9 % infusion 40 mL  As Needed         04/12/23 1710

## 2023-04-13 NOTE — OUTREACH NOTE
Prep Survey    Flowsheet Row Responses   [facility] facility patient discharged from? [place]   Is LACE score < 7 ? No   Eligibility Not Eligible   What are the reasons patient is not eligible? Readmitted   Does the patient have one of the following disease processes/diagnoses(primary or secondary)? Other   Prep survey completed? Yes          JOANIE EVERETT - Registered Nurse

## 2023-04-13 NOTE — H&P
ASSESSMENT/PLAN:    37-year-old lady who underwent laparoscopic cholecystectomy with cholangiogram yesterday and was found to have choledocholithiasis.  She was transferred here to [facility] for ERCP.  Dr. Barclay and Alannah consulted.  Procedure to be performed today.    CC:     Choledocholithiasis    HPI:    37-year-old lady who underwent laparoscopic cholecystectomy with cholangiogram yesterday and was found to have choledocholithiasis.  Relevant review of systems negative other than presenting complaints.    SOCIAL HISTORY:   • 0.5 pack per day tobacco use  • Occasional alcohol use    FAMILY HISTORY:    • Colorectal cancer: negative    PREVIOUS ABDOMINAL SURGERY    • Laparoscopic cholecystectomy with cholangiogram 2023  •   • Hysterectomy     PAST MEDICAL HISTORY:    • Anxiety/depression  • Hyperlipidemia   • Stroke     MEDICATIONS:   • Reviewed    ALLERGIES:   • Reviewed    PHYSICAL EXAM:   • Constitutional: Well-developed well-nourished, no acute distress  • Respiratory: Normal nonlabored inspiratory effort  • Cardiovascular: Regular rate, no jugular venous distention  • Gastrointestinal: Soft, appropriately tender  • Psychiatric: Alert and oriented ×3, normal affect     JOSE CRUZ M.D.

## 2023-04-14 VITALS
SYSTOLIC BLOOD PRESSURE: 112 MMHG | RESPIRATION RATE: 16 BRPM | HEIGHT: 67 IN | OXYGEN SATURATION: 95 % | TEMPERATURE: 97.1 F | WEIGHT: 250 LBS | BODY MASS INDEX: 39.24 KG/M2 | DIASTOLIC BLOOD PRESSURE: 63 MMHG | HEART RATE: 71 BPM

## 2023-04-14 LAB
ALBUMIN SERPL-MCNC: 3.1 G/DL (ref 3.5–5.2)
ALBUMIN/GLOB SERPL: 0.9 G/DL
ALP SERPL-CCNC: 215 U/L (ref 39–117)
ALT SERPL W P-5'-P-CCNC: 377 U/L (ref 1–33)
ANION GAP SERPL CALCULATED.3IONS-SCNC: 9.7 MMOL/L (ref 5–15)
AST SERPL-CCNC: 130 U/L (ref 1–32)
BILIRUB SERPL-MCNC: 0.4 MG/DL (ref 0–1.2)
BUN SERPL-MCNC: 8 MG/DL (ref 6–20)
BUN/CREAT SERPL: 17.8 (ref 7–25)
CALCIUM SPEC-SCNC: 7.9 MG/DL (ref 8.6–10.5)
CHLORIDE SERPL-SCNC: 108 MMOL/L (ref 98–107)
CO2 SERPL-SCNC: 19.3 MMOL/L (ref 22–29)
CREAT SERPL-MCNC: 0.45 MG/DL (ref 0.57–1)
EGFRCR SERPLBLD CKD-EPI 2021: 127.3 ML/MIN/1.73
GLOBULIN UR ELPH-MCNC: 3.3 GM/DL
GLUCOSE SERPL-MCNC: 81 MG/DL (ref 65–99)
POTASSIUM SERPL-SCNC: 4.1 MMOL/L (ref 3.5–5.2)
PROT SERPL-MCNC: 6.4 G/DL (ref 6–8.5)
SODIUM SERPL-SCNC: 137 MMOL/L (ref 136–145)

## 2023-04-14 PROCEDURE — 99231 SBSQ HOSP IP/OBS SF/LOW 25: CPT | Performed by: INTERNAL MEDICINE

## 2023-04-14 PROCEDURE — 25010000002 HYDROMORPHONE PER 4 MG: Performed by: INTERNAL MEDICINE

## 2023-04-14 PROCEDURE — 80053 COMPREHEN METABOLIC PANEL: CPT | Performed by: INTERNAL MEDICINE

## 2023-04-14 RX ORDER — HYDROCODONE BITARTRATE AND ACETAMINOPHEN 5; 325 MG/1; MG/1
1 TABLET ORAL EVERY 4 HOURS PRN
Qty: 10 TABLET | Refills: 0 | Status: SHIPPED | OUTPATIENT
Start: 2023-04-14

## 2023-04-14 RX ORDER — ONDANSETRON 4 MG/1
4 TABLET, FILM COATED ORAL EVERY 6 HOURS PRN
Qty: 10 TABLET | Refills: 1 | Status: SHIPPED | OUTPATIENT
Start: 2023-04-14

## 2023-04-14 RX ADMIN — BUPROPION HYDROCHLORIDE 300 MG: 300 TABLET, EXTENDED RELEASE ORAL at 09:18

## 2023-04-14 RX ADMIN — HYDROMORPHONE HYDROCHLORIDE 0.5 MG: 1 INJECTION, SOLUTION INTRAMUSCULAR; INTRAVENOUS; SUBCUTANEOUS at 09:18

## 2023-04-14 RX ADMIN — PANTOPRAZOLE SODIUM 40 MG: 40 TABLET, DELAYED RELEASE ORAL at 05:11

## 2023-04-14 RX ADMIN — SODIUM CHLORIDE 100 ML/HR: 9 INJECTION, SOLUTION INTRAVENOUS at 09:18

## 2023-04-14 RX ADMIN — DULOXETINE HYDROCHLORIDE 30 MG: 30 CAPSULE, DELAYED RELEASE ORAL at 09:18

## 2023-04-14 RX ADMIN — HYDROMORPHONE HYDROCHLORIDE 0.5 MG: 1 INJECTION, SOLUTION INTRAMUSCULAR; INTRAVENOUS; SUBCUTANEOUS at 05:11

## 2023-04-14 NOTE — DISCHARGE SUMMARY
DATE OF ADMIT:    • 4/12/2023    DATE OF DISCHARGE:    • 4/14/2023    DIAGNOSIS:    • Choledocholithiasis    PROCEDURES:    • ERCP with stone extraction 4/13/2023    SUMMARY OF HOSPITAL COURSE:     Transferred from [facility] after laparoscopic cholecystectomy with cholangiogram revealed choledocholithiasis.  Placed on antibiotics and underwent ERCP with stone extraction the next day.  The day of discharge, her LFTs and bilirubin continue to improve, she was tolerating regular diet, and pain was well controlled.    DIET: Regular    ACTIVITY: Walking encouraged, no lifting or strenuous activity    MEDICATIONS: Refer to MAR.  Prescriptions for hydrocodone and Zofran given.    FOLLOW-UP: To call Dr. Mandujano's office and schedule 1 week follow-up appointment    Rober Leach M.D.

## 2023-04-14 NOTE — PROGRESS NOTES
[facility]  Inpatient Progress Note    Reason for Followup:  Choledocholithiasis    Subjective:  No nausea overnight, tolerated clear liquid diet.     Current Facility-Administered Medications:   •  buPROPion XL (WELLBUTRIN XL) 24 hr tablet 300 mg, 300 mg, Oral, Daily, Mitch Barclay MD, 300 mg at 04/14/23 0918  •  DULoxetine (CYMBALTA) DR capsule 30 mg, 30 mg, Oral, Daily, Mitch Barclay MD, 30 mg at 04/14/23 0918  •  HYDROmorphone (DILAUDID) injection 0.5 mg, 0.5 mg, Intravenous, Q2H PRN, 0.5 mg at 04/14/23 0918 **AND** naloxone (NARCAN) injection 0.4 mg, 0.4 mg, Intravenous, Q5 Min PRN, Mitch Barclay MD  •  ondansetron (ZOFRAN) injection 4 mg, 4 mg, Intravenous, Q6H PRN, Mitch Barclay MD  •  pantoprazole (PROTONIX) EC tablet 40 mg, 40 mg, Oral, Q AM, Mitch Barclay MD, 40 mg at 04/14/23 0511  •  sodium chloride 0.9 % flush 10 mL, 10 mL, Intravenous, Q12H, Mitch Barclay MD, 10 mL at 04/13/23 2220  •  sodium chloride 0.9 % flush 10 mL, 10 mL, Intravenous, PRN, Mitch Barclay MD  •  sodium chloride 0.9 % infusion 40 mL, 40 mL, Intravenous, PRN, Mitch Barclay MD  •  sodium chloride 0.9 % infusion, 100 mL/hr, Intravenous, Continuous, Mitch Barclay MD, Last Rate: 100 mL/hr at 04/14/23 0918, 100 mL/hr at 04/14/23 0918  •  sodium chloride 0.9 % infusion, 30 mL/hr, Intravenous, Continuous PRN, Mitch Barclay MD, Stopped at 04/13/23 1700  Review of Systems:   Gen: No fever or chills  GI: No abd pain, nausea, vomiting      Objective     Vital Signs  Temp:  [97 °F (36.1 °C)-98.1 °F (36.7 °C)] 97 °F (36.1 °C)  Heart Rate:  [57-73] 60  Resp:  [15-18] 17  BP: ()/(49-75) 102/58  Body mass index is 39.16 kg/m².    Intake/Output Summary (Last 24 hours) at 4/14/2023 0958  Last data filed at 4/14/2023 0506  Gross per 24 hour   Intake 800 ml   Output 1040 ml   Net -240 ml     No intake/output data recorded.     Physical Exam:   General: patient awake, alert and cooperative   Eyes: Normal lids and  lashes, no scleral icterus   Skin: warm and dry, not jaundiced   Cardiovascular: regular rate,  well-perfused extremities   Pulm: Equal expansion bilaterally, no increased WOB   Abdomen: soft, nontender; RENU drain              Neuro: A&O, no obvious sign of focal deficit   Psychiatric: Normal mood and behavior;        Results Review:     I reviewed the patient's new clinical results.    Results from last 7 days   Lab Units 04/13/23  0534 04/11/23  2341   WBC 10*3/mm3 10.82* 6.92   HEMOGLOBIN g/dL 12.9 13.3   HEMATOCRIT % 37.7 40.6   PLATELETS 10*3/mm3 212 221     Results from last 7 days   Lab Units 04/14/23  0453 04/13/23  0534 04/11/23  2341   SODIUM mmol/L 137 139 135*   POTASSIUM mmol/L 4.1 4.0 3.7   CHLORIDE mmol/L 108* 109* 103   CO2 mmol/L 19.3* 23.2 23.0   BUN mg/dL 8 8 8   CREATININE mg/dL 0.45* 0.59 0.76   CALCIUM mg/dL 7.9* 8.7 8.6   BILIRUBIN mg/dL 0.4 0.8 1.6*   ALK PHOS U/L 215* 260* 292*   ALT (SGPT) U/L 377* 569* 750*   AST (SGOT) U/L 130* 283* 593*   GLUCOSE mg/dL 81 78 115*         Lab Results   Lab Value Date/Time    LIPASE 60 04/11/2023 2341    LIPASE 11 04/02/2023 1922       Radiology:  [unfilled]      Assessment & Plan   Assessment:   Abdominal Pain  Cholecystitis  Choledocholithiasis  Elevated Liver Enzymes      Plan:   - 4/13 EGD with gastritis biopsied.  ERCP with sphincterotomy and stone extraction  - LFTs improving  - Tolerated clears overnight without pain or nausea, advance as tolerated to low fat diet  - Okay with discharge from GI standpoint, repeat CMP at surgical or PCP follow up in 1-2 weeks    I discussed the patient's findings and my recommendations with patient.

## 2023-04-14 NOTE — PLAN OF CARE
Goal Outcome Evaluation:  Plan of Care Reviewed With: patient        Progress: improving  Outcome Evaluation: POD #1 EGD and ERCP, POD#2 lana lap. VSS, IV fluids, ambulating, voiding well, no nausea, tolerated clear liquid diet well, 4 lap sites c/d/i, RENU drain, patient hoping to advance diet today.

## 2023-04-14 NOTE — PLAN OF CARE
Goal Outcome Evaluation:   PT returned to floor at 1700 HRS, from EGD/ERCP. Alert and  O x 4. RENU drain patent and intact. Pain managed with IV dilaudid. VSS. WCTM.

## 2023-04-17 LAB
LAB AP CASE REPORT: NORMAL
LAB AP CASE REPORT: NORMAL
PATH REPORT.FINAL DX SPEC: NORMAL
PATH REPORT.FINAL DX SPEC: NORMAL
PATH REPORT.GROSS SPEC: NORMAL
PATH REPORT.GROSS SPEC: NORMAL

## 2023-04-19 NOTE — PROGRESS NOTES
Please let patient know:    Biopsies from stomach with no evidence of H pylori.  Continue on protonix and follow up with primary care physician as scheduled.

## 2023-04-28 ENCOUNTER — OFFICE VISIT (OUTPATIENT)
Dept: SURGERY | Facility: CLINIC | Age: 38
End: 2023-04-28
Payer: COMMERCIAL

## 2023-04-28 DIAGNOSIS — Z90.49 STATUS POST LAPAROSCOPIC CHOLECYSTECTOMY: Primary | ICD-10-CM

## 2023-04-28 PROBLEM — K81.0 ACUTE CHOLECYSTITIS: Status: RESOLVED | Noted: 2023-04-12 | Resolved: 2023-04-28

## 2023-04-28 PROBLEM — E66.01 MORBID (SEVERE) OBESITY DUE TO EXCESS CALORIES: Status: ACTIVE | Noted: 2023-04-28

## 2023-04-28 PROBLEM — K80.44 CHOLEDOCHOLITHIASIS WITH CHRONIC CHOLECYSTITIS: Status: RESOLVED | Noted: 2023-04-12 | Resolved: 2023-04-28

## 2023-04-28 PROCEDURE — 1160F RVW MEDS BY RX/DR IN RCRD: CPT | Performed by: SURGERY

## 2023-04-28 PROCEDURE — 99024 POSTOP FOLLOW-UP VISIT: CPT | Performed by: SURGERY

## 2023-04-28 PROCEDURE — 1159F MED LIST DOCD IN RCRD: CPT | Performed by: SURGERY

## 2023-04-28 NOTE — PROGRESS NOTES
Delphine King 37 y.o. female presents for PO FU Lap Sridevi.       HPI   Above noted.  Delphine is doing much better.      Review of Systems        Past Medical History:   Diagnosis Date   • Anxiety    • Asthma    • Cholelithiasis    • Colon polyp    • Deep vein thrombosis    • Depression    • Fissure, anal 2017   • Hyperlipidemia    • MMN (multifocal motor neuropathy)    • Neuropathy    • PE (pulmonary thromboembolism)    • Stroke            Past Surgical History:   Procedure Laterality Date   • ANAL FISTULA REPAIR     •  SECTION     • CHOLECYSTECTOMY WITH INTRAOPERATIVE CHOLANGIOGRAM N/A 2023    Procedure: CHOLECYSTECTOMY LAPAROSCOPIC INTRAOPERATIVE CHOLANGIOGRAM;  Surgeon: Goldie Mandujano DO;  Location: [facility];  Service: General;  Laterality: N/A;   • ENDOSCOPY N/A 2023    Procedure: ESOPHAGOGASTRODUODENOSCOPY WITH COLD BX'S;  Surgeon: Mitch Barclay MD;  Location: [facility] ENDOSCOPY;  Service: Gastroenterology;  Laterality: N/A;  pre: abdominal pain  post: gastritis   • ERCP N/A 2023    Procedure: ENDOSCOPIC RETROGRADE CHOLANGIOPANCREATOGRAPHY WITH SPHINCTEROTOMY AND BALOON SWEEP;  Surgeon: Mitch Barclay MD;  Location: [facility] ENDOSCOPY;  Service: Gastroenterology;  Laterality: N/A;  pre: biliary obstruction, choledocholithiasis  post: choledocholithiasis   • HYSTERECTOMY     • PORTACATH PLACEMENT Right     power port   • TONSILLECTOMY     • WISDOM TOOTH EXTRACTION             Physical Exam  General:  Awake and alert with no acute distress  Eyes:  No icterus  Abdomen:  Soft, non-tender  Incision:  Clean, dry, intact      There were no vitals taken for this visit.        Diagnoses and all orders for this visit:    1. Status post laparoscopic cholecystectomy (Primary)    Delphine may return anytime as needed.    Thank you for allowing me to participate in the care of this interesting patient.    Answers for HPI/ROS submitted by the patient on 2023  What is the primary reason for your  visit?: Neurological Problem

## 2023-04-28 NOTE — LETTER
2023       No Recipients    Patient: Delphine King   YOB: 1985   Date of Visit: 2023       Dear Dr. Glynn Recipients:    Thank you for referring Delphine King to me for evaluation. Below are the relevant portions of my assessment and plan of care.    If you have questions, please do not hesitate to call me. I look forward to following Delphine along with you.         Sincerely,        Goldie Mandujano DO        CC:   No Recipients    Goldie Mandujano DO  23 0913  Sign when Signing Visit  Delphine King 37 y.o. female presents for PO FU Lap Sridevi.       HPI   Above noted.  Delphine is doing much better.      Review of Systems        Past Medical History:   Diagnosis Date   • Anxiety    • Asthma    • Cholelithiasis    • Colon polyp    • Deep vein thrombosis    • Depression    • Fissure, anal 2017   • Hyperlipidemia    • MMN (multifocal motor neuropathy)    • Neuropathy    • PE (pulmonary thromboembolism)    • Stroke            Past Surgical History:   Procedure Laterality Date   • ANAL FISTULA REPAIR     •  SECTION     • CHOLECYSTECTOMY WITH INTRAOPERATIVE CHOLANGIOGRAM N/A 2023    Procedure: CHOLECYSTECTOMY LAPAROSCOPIC INTRAOPERATIVE CHOLANGIOGRAM;  Surgeon: Goldie Mandujano DO;  Location: [facility];  Service: General;  Laterality: N/A;   • ENDOSCOPY N/A 2023    Procedure: ESOPHAGOGASTRODUODENOSCOPY WITH COLD BX'S;  Surgeon: Mitch Barclay MD;  Location: [facility] ENDOSCOPY;  Service: Gastroenterology;  Laterality: N/A;  pre: abdominal pain  post: gastritis   • ERCP N/A 2023    Procedure: ENDOSCOPIC RETROGRADE CHOLANGIOPANCREATOGRAPHY WITH SPHINCTEROTOMY AND BALOON SWEEP;  Surgeon: Mitch Barclay MD;  Location: [facility] ENDOSCOPY;  Service: Gastroenterology;  Laterality: N/A;  pre: biliary obstruction, choledocholithiasis  post: choledocholithiasis   • HYSTERECTOMY     • PORTACATH PLACEMENT Right     power port   • TONSILLECTOMY     • WISDOM TOOTH  EXTRACTION             Physical Exam  General:  Awake and alert with no acute distress  Eyes:  No icterus  Abdomen:  Soft, non-tender  Incision:  Clean, dry, intact      There were no vitals taken for this visit.        Diagnoses and all orders for this visit:    1. Status post laparoscopic cholecystectomy (Primary)    Delphine may return anytime as needed.    Thank you for allowing me to participate in the care of this interesting patient.    Answers for HPI/ROS submitted by the patient on 4/26/2023  What is the primary reason for your visit?: Neurological Problem

## 2023-05-18 ENCOUNTER — TELEPHONE (OUTPATIENT)
Dept: GASTROENTEROLOGY | Facility: CLINIC | Age: 38
End: 2023-05-18
Payer: COMMERCIAL

## 2023-05-18 NOTE — TELEPHONE ENCOUNTER
----- Message from Mitch Barclay MD sent at 4/19/2023  6:37 PM EDT -----  Please let patient know:    Biopsies from stomach with no evidence of H pylori.  Continue on protonix and follow up with primary care physician as scheduled.

## 2023-08-07 ENCOUNTER — HOSPITAL ENCOUNTER (OUTPATIENT)
Dept: OCCUPATIONAL THERAPY | Facility: HOSPITAL | Age: 38
Setting detail: THERAPIES SERIES
Discharge: HOME | End: 2023-08-07
Payer: COMMERCIAL

## 2023-08-07 DIAGNOSIS — G61.82 MMN (MULTIFOCAL MOTOR NEUROPATHY): ICD-10-CM

## 2023-08-07 DIAGNOSIS — G61.82 MULTIFOCAL MOTOR NEUROPATHY: Primary | ICD-10-CM

## 2023-08-07 PROCEDURE — 97168 OT RE-EVAL EST PLAN CARE: CPT

## 2023-08-07 NOTE — THERAPY RE-EVALUATION
Outpatient Occupational Therapy Neuro Re-Evaluation   Robyn Karimi     Patient Name: Delphine King  : 1985  MRN: [id number]  Today's Date: 2023      Visit Date: 2023    Patient Active Problem List   Diagnosis    Rectovaginal fistula: s/p repair in     History of pulmonary embolism: lifelong anticoagulation    MMN (multifocal motor neuropathy)    Morbid (severe) obesity due to excess calories (*specify comorbidity)        Past Medical History:   Diagnosis Date    Anxiety     Asthma     Cholelithiasis     Colon polyp     Deep vein thrombosis     Depression     Fissure, anal 2017    Hyperlipidemia     MMN (multifocal motor neuropathy)     Neuropathy     PE (pulmonary thromboembolism)     Stroke         Past Surgical History:   Procedure Laterality Date    ANAL FISTULA REPAIR       SECTION      CHOLECYSTECTOMY WITH INTRAOPERATIVE CHOLANGIOGRAM N/A 2023    Procedure: CHOLECYSTECTOMY LAPAROSCOPIC INTRAOPERATIVE CHOLANGIOGRAM;  Surgeon: Goldie Mandujano DO;  Location: [facility];  Service: General;  Laterality: N/A;    ENDOSCOPY N/A 2023    Procedure: ESOPHAGOGASTRODUODENOSCOPY WITH COLD BX'S;  Surgeon: Mitch Barclay MD;  Location: [facility] ENDOSCOPY;  Service: Gastroenterology;  Laterality: N/A;  pre: abdominal pain  post: gastritis    ERCP N/A 2023    Procedure: ENDOSCOPIC RETROGRADE CHOLANGIOPANCREATOGRAPHY WITH SPHINCTEROTOMY AND BALOON SWEEP;  Surgeon: Mitch Barclay MD;  Location: [facility] ENDOSCOPY;  Service: Gastroenterology;  Laterality: N/A;  pre: biliary obstruction, choledocholithiasis  post: choledocholithiasis    HYSTERECTOMY      PORTACATH PLACEMENT Right     power port    TONSILLECTOMY      WISDOM TOOTH EXTRACTION           Visit Dx:      ICD-10-CM ICD-9-CM   1. Multifocal motor neuropathy  G61.82 357.89   2. MMN (multifocal motor neuropathy) ([facility]): Receieved IVIG  G61.82 357.89            OT Neuro       Row Name 23 1200 23 1100          Subjective  "Comments    Subjective Comments Patient reports she had most recent IVIG treatment last week (July 31, Aug. 1 and 2nd). Reports no significant changes, \"It hasn't gotten worse but hasn't really gotten better.\"  -EN --        Subjective Pain    Subjective Pain Comment Reports a recent left shoulder injury pain and was referred to P.T. for dry needling later this week.  -EN --        Sensation    Additional Comments -- reports occasional numbness and tingling in both hands and in LEs  -EN        Coordination    9-Hole Peg Left -- 16.78  -EN     9-Hole Peg Right -- 20.77  -EN        Right Upper Ext    Rt Shoulder Abduction AROM -- WFL  -EN     Rt Shoulder Flexion AROM -- WFL  -EN     Rt Elbow Supination AROM -- WFL  -EN     Rt Elbow Pronation AROM -- WFL  -EN     Rt Wrist Flexion AROM -- WFL  -EN     Rt Wrist Extension AROM -- history of wrist drop (10 years ago) requires effort to extend wrist and unable to fully extend digits 3-5  -EN        Left Upper Ext    Lt Shoulder Abduction AROM -- WFL  -EN     Lt Shoulder Flexion AROM -- WFL  -EN     Lt Elbow Extension/Flexion AROM -- WFL  -EN     Lt Elbow Supination AROM -- WFL  -EN     Lt Elbow Pronation AROM -- WFL  -EN     Lt Wrist Flexion AROM -- WFL  -EN     Lt Wrist Extension AROM -- WFL  -EN        MMT Right Upper Ext    Rt Shoulder Flexion MMT, Gross Movement -- (4/5) good  -EN     Rt Shoulder Extension MMT, Gross Movement -- (4+/5) good plus  -EN     Rt Shoulder ABduction MMT, Gross Movement -- (4/5) good  -EN     Rt Shoulder ADduction MMT, Gross Movement -- (4+/5) good plus  -EN     Rt Elbow Flexion MMT, Gross Movement: -- (5/5) normal  -EN     Rt Elbow Extension MMT, Gross Movement: -- (4+/5) good plus  -EN     Rt Forearm Supination MMT, Gross Movement -- (4+/5) good plus  -EN     Rt Forearm Pronation MMT, Gross Movement -- (4+/5) good plus  -EN     Rt Wrist Flexion MMT, Gross Movement -- (4-/5) good minus  -EN     Rt Wrist Extension MMT, Gross Movement -- (3+/5) " fair plus  -EN        MMT Left Upper Ext    Lt Shoulder Flexion MMT, Gross Movement -- (5/5) normal  -EN     Lt Shoulder Extension MMT, Gross Movement -- (5/5) normal  -EN     Lt Shoulder ABduction MMT, Gross Movement -- (5/5) normal  -EN     Lt Shoulder ADduction MMT, Gross Movement -- (5/5) normal  -EN     Lt Elbow Flexion MMT, Gross Movement -- (5/5) normal  -EN     Lt Elbow Extension MMT, Gross Movement -- (5/5) normal  -EN     Lt Forearm Supination MMT, Gross Movement -- (5/5) normal  -EN     Lt Forearm Pronation MMT, Gross Movement -- (5/5) normal  -EN     Lt Wrist Flexion MMT, Gross Movement -- (5/5) normal  -EN     Lt Wrist Extension MMT, Gross Movement -- (5/5) normal  -EN               User Key  (r) = Recorded By, (t) = Taken By, (c) = Cosigned By      Initials Name Provider Type    Diana Grimes OTR Occupational Therapist                    Hand Therapy (last 24 hours)       Hand Eval       Row Name 08/07/23 1200             Right Extension AROM    II- MP AROM 0  -EN      II- PIP AROM 0  -EN      II- DIP AROM 0  -EN      II- FREDERICK Right Extension AROM 0  -EN      III- MP AROM -74  -EN      III- PIP AROM -42  -EN      III- DIP AROM 0  -EN      III- FRED[place]ICK Right Extension AROM -116  -EN      IV- MP AROM -61  -EN      IV- PIP AROM -41  -EN      IV- DIP AROM 0  -EN      IV- FR[place]ICK Right Extension AROM -102  -EN      V- MP AROM -49  -EN      V- PIP AROM 0  -EN      V- DIP AROM 0  -EN      V- FREDERICK Right Extension AROM -49  -EN         Right Flexion AROM    II- MP AROM 73  -EN      II- PIP AROM 96  -EN      II- DIP AROM 55  -EN      II- FR[place]ICK Right Flexion AROM 224  -EN      III- MP AROM 83  -EN      III- PIP AROM 90  -EN      III- DIP AROM 46  -EN      III- FREDERICK Right Flexion AROM 219  -EN      IV- MP AROM 70  -EN      IV- PIP AROM 100  -EN      IV- DIP AROM 45  -EN      IV- FREDERICK Right Flexion AROM 215  -EN      V- MP AROM 80  -EN      V- PIP AROM 90  -EN      V- DIP AROM 64  -EN      V- FREDERICK Right Flexion AROM  234  -EN         Right Thumb AROM    MP Flexion - AROM 50  -EN      IP Flexion - AROM 66  -EN          Strength Right    # Reps 3  -EN      Right Rung 2  -EN      Right  Test 1 30  -EN      Right  Test 2 27  -EN      Right  Test 3 22  -EN       Strength Average Right 26.33  -EN          Strength Left    # Reps 3  -EN      Left Rung 2  -EN      Left  Test 1 59  -EN      Left  Test 2 56  -EN      Left  Test 3 50  -EN       Strength Average Left 55  -EN         Right Hand Strength - Pinch (lbs)    Lateral 7 lbs  -EN      Tip (3 point) 6.66 lbs  -EN         Left Hand Strength - Pinch (lbs)    Lateral 12.33 lbs  -EN      Tip (3 point) 15.66 lbs  -EN         Therapy Education    Given HEP  -EN      Program Reinforced  -EN      How Provided Verbal  -EN      Provided to Patient  -EN      Level of Understanding Teach back education performed;Verbalized  -EN                User Key  (r) = Recorded By, (t) = Taken By, (c) = Cosigned By      Initials Name Provider Type    EN Diana Richmond OTR Occupational Therapist                        Therapy Education  Education Details: Continue with theraband and theraputty. Continue use of adaptive equipment and compensatory strategies for ADL independence.  Given: HEP  Program: Reinforced  How Provided: Verbal  Provided to: Patient  Level of Understanding: Teach back education performed, Verbalized     OT Goals       Row Name 08/07/23 1200          OT Short Term Goals    STG Date to Achieve 10/09/23  -EN     STG 1 Patient will demonstrate improved right  strength by 3# for improved grasp of objects. (Currently 25# pounds)  -EN     STG 1 Progress Ongoing;Progressing  -EN     STG 1 Progress Comments 26.33#  -EN     STG 2 Patient will report modified independence with ADL/IADL routine (compensatory strategies, adaptive equipment, etc..)  -EN     STG 2 Progress Met  -EN        Long Term Goals    LTG Date to Achieve 12/11/23  -EN     LTG 1  Patient will demonstrate improved fine motor coordination evidenced by a 5 second improved 9 hole peg score (right hand)  -EN     LTG 1 Progress Met  -EN     LTG 2 Patient will demonstrate improved right hand  strength by 20 pounds for improved grasp of objects.(30#)  -EN     LTG 2 Progress Progressing;Ongoing  -EN     LTG 2 Progress Comments currently 26.33#, reinforced theraputty  -EN     LTG 3 Patient will improve Quick Dash score 50 points.  -EN     LTG 3 Progress Ongoing  -EN               User Key  (r) = Recorded By, (t) = Taken By, (c) = Cosigned By      Initials Name Provider Type    Diana Grimes, OTR Occupational Therapist                            OT Assessment/Plan       Row Name 08/07/23 1258          OT Assessment    Assessment Comments Patient reports she has continued with regular IVIG treatments since last seen in OT on March 21, 2023. Pt reports the IVIG helps keep her functioning. Pt has no significant changes in B shoulder/elbow/forearm/wrist strength (4 to 4+/5 in R UE with exception of right wrist) and 5/5 in the L UE. , lateral pinch and 9 Hole peg scores as follows:                                                                                                                                              Right : 11/1/22 - 2#, 11/29 - 16#, 12/28 - 18#, 2/21/23 - 21.33#, 3/21 - 20 #, 8/7/23 - 26.33#                                                                                                                                                                                                        Left :    11/1/22 - 22#  11/29 - 32#, 12/28 - 52.3#, 2/21/23- 56#, 3/21 - 41.66#, 8/7/23 - 55 #                                                                                                                                                                                                 Right lateral pinch:  11/1/22 - 3#, 11/29 - 7.3#, 12/28 - 7#, 12/21/23 6#, 3/21 - 7#, 8/7/23  - 7#                                                                                                                                                                                                                               Left lateral pinch:  22 -  7.3#,  - 9.6#,  - 11.3#, 23 - 13.3#, 3/21 - 11.66# ,  - 12.33#                                                                                                                                                                               Right 9 Hole Pe23 - 30.56 seconds,  - 18.78 seconds,  - 19.33 seconds, 23 - 18.94 seconds, 3/21 - 21.10 seconds, 23 - 20.77 seconds                                                                                           Left 9 Hole Pe/1/23  - 22.75 seconds,  19.42 seconds,   - 18.95 seconds, 23 - 18.16 seconds,  3/21 18.39 seconds,  23 16.78 seconds                                                                                                       Overall patient demonstrated progress in both hands with coordination and strength compared to last appointment on 3/21/23. Encouraged patient to continue with HEP and compensatory strategies/adaptive equipment for ADL/IADL independence.  -EN     OT Diagnosis MMN, decreased UE functional use (oscar R UE)  -EN     OT Rehab Potential Good  -EN     Patient/caregiver participated in establishment of treatment plan and goals Yes  -EN     Patient would benefit from skilled therapy intervention Yes  -EN        OT Plan    OT Frequency Other (comment)  One time per month. Patient will continue with HEP and monthly IVIG treatments.  -EN     Predicted Duration of Therapy Intervention (OT) 6 months  -EN     Planned CPT's? OT EVAL LOW COMPLEXITY: 89316;OT THER ACT EA 15 MIN: [id number]  -EN     Planned Therapy Interventions (Optional Details) home exercise program;patient/family education;strengthening  -EN     OT Plan Comments  Continue with plan  -EN               User Key  (r) = Recorded By, (t) = Taken By, (c) = Cosigned By      Initials Name Provider Type    Diana Grimes OTR Occupational Therapist                   OT Exercises       Row Name 08/07/23 1200             Exercise 1    Exercise Name 1 Review of HEP, encouraged to continue with theraband and theraputty exercises  -EN                User Key  (r) = Recorded By, (t) = Taken By, (c) = Cosigned By      Initials Name Provider Type    Diana Grimes OTR Occupational Therapist                      Outcome Measure Options: Quick DASH  9 Hole Peg  9-Hole Peg Left: 16.78  9-Hole Peg Right: 20.77  Quick DASH  Open a tight or new jar.: Moderate Difficulty  Do heavy household chores (e.g., wash walls, wash floors): Severe Difficulty  Carry a shopping bag or briefcase: Severe Difficulty  Wash your back: Unable  Use a knife to cut food: Moderate Difficulty  Recreational activities in which you take some force or impact through your arm, should or hand (e.g. golf, hammering, tennis, etc.): Unable  During the past week, to what extent has your arm, shoulder, or hand problem interfered with your normal social activites with family, friends, neighbors or groups?: Slightly  During the past week, were you limited in your work or other regular daily activities as a result of your arm, shoulder or hand problem?: Slightly Limited  Arm, Shoulder, or hand pain: Moderate  Tingling (pins and needles) in your arm, shoulder, or hand: Moderate  During the past week, how much difficulty have you had sleeping because of the pain in your arm, shoulder or hand?: Severe Difficulty  Number of Questions Answered: 11  Quick DASH Score: 61.36         Time Calculation:   OT Start Time: 1100  OT Stop Time: 1135  OT Time Calculation (min): 35 min     Therapy Charges for Today       Code Description Service Date Service Provider Modifiers Qty    [id number]  OT RE-EVAL 2 8/7/2023 Yi  Diana ESPINO, OTR GO 1                       Daina Richmond, OTR  8/7/2023

## 2023-08-09 ENCOUNTER — HOSPITAL ENCOUNTER (OUTPATIENT)
Dept: PHYSICAL THERAPY | Facility: HOSPITAL | Age: 38
Setting detail: THERAPIES SERIES
Discharge: HOME | End: 2023-08-09
Payer: COMMERCIAL

## 2023-08-09 DIAGNOSIS — G61.82 MMN (MULTIFOCAL MOTOR NEUROPATHY): Primary | ICD-10-CM

## 2023-08-09 PROCEDURE — 97161 PT EVAL LOW COMPLEX 20 MIN: CPT

## 2023-08-09 NOTE — THERAPY EVALUATION
.Outpatient Physical Therapy Neuro Initial Evaluation   Robyn Karimi     Patient Name: Delphine King  : 1985  MRN: [id number]  Today's Date: 2023      Visit Date: 2023    Patient Active Problem List   Diagnosis    Rectovaginal fistula: s/p repair in 2016    History of pulmonary embolism: lifelong anticoagulation    MMN (multifocal motor neuropathy)    Morbid (severe) obesity due to excess calories (*specify comorbidity)        Past Medical History:   Diagnosis Date    Anxiety     Asthma     Cholelithiasis     Colon polyp     Deep vein thrombosis     Depression     Fissure, anal 2017    Hyperlipidemia     MMN (multifocal motor neuropathy)     Neuropathy     PE (pulmonary thromboembolism)     Stroke         Past Surgical History:   Procedure Laterality Date    ANAL FISTULA REPAIR       SECTION      CHOLECYSTECTOMY WITH INTRAOPERATIVE CHOLANGIOGRAM N/A 2023    Procedure: CHOLECYSTECTOMY LAPAROSCOPIC INTRAOPERATIVE CHOLANGIOGRAM;  Surgeon: Goldie Mandujano DO;  Location: [facility];  Service: General;  Laterality: N/A;    ENDOSCOPY N/A 2023    Procedure: ESOPHAGOGASTRODUODENOSCOPY WITH COLD BX'S;  Surgeon: Mitch Barclay MD;  Location: [facility] ENDOSCOPY;  Service: Gastroenterology;  Laterality: N/A;  pre: abdominal pain  post: gastritis    ERCP N/A 2023    Procedure: ENDOSCOPIC RETROGRADE CHOLANGIOPANCREATOGRAPHY WITH SPHINCTEROTOMY AND BALOON SWEEP;  Surgeon: Mitch Barclay MD;  Location: [facility] ENDOSCOPY;  Service: Gastroenterology;  Laterality: N/A;  pre: biliary obstruction, choledocholithiasis  post: choledocholithiasis    HYSTERECTOMY      PORTACATH PLACEMENT Right     power port    TONSILLECTOMY      WISDOM TOOTH EXTRACTION           Visit Dx:     ICD-10-CM ICD-9-CM   1. MMN (multifocal motor neuropathy) (HCC): Receieved IVIG  G61.82 357.89                PT Neuro       Row Name 23 1300             Subjective Comments    Subjective Comments Pt reports it has  taken a long time for her to recover from GB surgery.  -JV         Precautions and Contraindications    Precautions/Limitations no known precautions/limitations  -JV         Home Living    Current Living Arrangements home  -JV      Home Accessibility stairs within home  -JV      Home Equipment Cane;Grab bars;Other (Comment)  R AFO  -JV         Cognition    Overall Cognitive Status WFL  -JV      Orientation Level Oriented X4  -JV         Sensation    Sensation WNL? WNL  -JV         Posture/Observations    Alignment Options Lumbar lordosis  -JV      Lumbar lordosis Moderate;Decreased  -JV      Observations Muscle atrophy  -JV      Posture/Observations Comments PPT in sitting and standing  -JV         General ROM    GENERAL ROM COMMENTS Oj LE ROM is WFL's  -JV         MMT Right Lower Ext    Rt Hip Flexion MMT, Gross Movement (4-/5) good minus  -JV      Rt Hip Extension MMT, Gross Movement (4-/5) good minus  -JV      Rt Hip ABduction MMT, Gross Movement (4-/5) good minus  -JV      Rt Hip ADduction MMT, Gross Movement (4/5) good  -JV      Rt Knee Extension MMT, Gross Movement (4/5) good  -JV      Rt Ankle Dorsiflexion MMT, Gross Movement (4-/5) good minus  -JV         MMT Left Lower Ext    Lt Hip Flexion MMT, Gross Movement (4-/5) good minus  -JV      Lt Hip Extension MMT, Gross Movement (4-/5) good minus  -JV      Lt Hip ABduction MMT, Gross Movement (4-/5) good minus  -JV      Lt Hip ADduction MMT, Gross Movement (4/5) good  -JV      Lt Knee Extension MMT, Gross Movement (4/5) good  -JV      Lt Ankle Dorsiflexion MMT, Gross Movement (4-/5) good minus  -JV         Bed Mobility    Comment, (Bed Mobility) NT  -JV         Transfers    Sit-Stand [place] (Transfers) modified independence  -JV      Stand-Sit [place] (Transfers) independent  -JV         Gait/Stairs (Locomotion)    [place] Level (Gait) modified independence  -JV      Distance in Feet (Gait) 300  -JV      Pattern (Gait) step-through  -JV       "Deviations/Abnormal Patterns (Gait) base of support, wide;iv[place] decreased;gait speed decreased;stride length decreased;right sided deviations  -JV      Bilateral Gait Deviations decreased arm swing  -JV      Right Sided Gait Deviations foot drop/toe drag;heel strike decreased  -JV      [place] Level (Stairs) not tested  -JV         Balance Skills Training    Balance Comments See MILLER  -JV                User Key  (r) = Recorded By, (t) = Taken By, (c) = Cosigned By      Initials Name Provider Type    Cleopatra Glynn, PT Physical Therapist                            Therapy Education  Education Details: Add lateral step ups on 4\" platform to HEP  Given: HEP  Program: New  How Provided: Verbal, Demonstration  Provided to: Patient  Level of Understanding: Teach back education performed, Verbalized, Demonstrated     PT OP Goals       Row Name 08/09/23 1300          PT Short Term Goals    STG Date to Achieve 09/08/23  -JV     STG 1 Pt will be I with HEP  -JV     STG 1 Progress Ongoing  -JV     STG 2 Admin DGI at next visit  -JV     STG 2 Progress New  -JV        Long Term Goals    LTG Date to Achieve 10/05/23  -JV     LTG 1 Pt will amb up/down steps with reciprocal pattern and 1 HR or cane.  -JV     LTG 1 Progress New  -JV     LTG 2 Pt will improve 5XSST time to < 16.5 sec.  -JV     LTG 3 Pt will increase Oj LE strength to 4/5  -JV        Time Calculation    PT Goal Re-Cert Due Date 09/08/23  -JV               User Key  (r) = Recorded By, (t) = Taken By, (c) = Cosigned By      Initials Name Provider Type    Cleopatra Glynn PT Physical Therapist                     PT Assessment/Plan       Row Name 08/09/23 1752          PT Assessment    Functional Limitations Decreased safety during functional activities;Impaired gait;Limitation in home management;Limitations in community activities;Performance in leisure activities;Performance in self-care ADL  -J     Impairments " Balance;Coordination;Endurance;Gait;Impaired muscle endurance;Motor function;Muscle strength  -JV     Assessment Comments The pt presents for re-evaluation this date after recovering from GB surgery in 4/23. The pt's most recent IVIG infusion was 7/31-8/2/23. The pt was assessed with the LEFS, 5XSTS, TUG, MILLER and MMT. The pt's score on the LEFS decreased from 34 to 30 pts, 5XSTS test increased from 19.47 to 21.18 sec, TUG time was not significantly different, MILLER score was unchanged at 56/56, and MMT indicated decreased strength Oj ankle DF. The pt would like to improve ambulation on stairs, so a new goal was added to plan for that.  -JV     Rehab Potential Good  -JV     Patient/caregiver participated in establishment of treatment plan and goals Yes  -JV     Patient would benefit from skilled therapy intervention Yes  -JV        PT Plan    PT Frequency Other (comment)  1-2 times per month  -JV     Planned CPT's? PT EVAL LOW COMPLEXITY: 67795;PT THER PROC EA 15 MIN: 85165;PT THER ACT EA 15 MIN: 31713;PT NEUROMUSC RE-EDUCATION EA 15 MIN: 67905;PT GAIT TRAINING EA 15 MIN: 12077;PT ELECTRICAL STIM UNATTEND:   -JV     PT Plan Comments Assess monthly as needed, progress HEP as vishal.  -JV               User Key  (r) = Recorded By, (t) = Taken By, (c) = Cosigned By      Initials Name Provider Type    JCleopatra Adames, PT Physical Therapist                        OP Exercises       Row Name 08/09/23 1300             Precautions    Existing Precautions/Restrictions fall  -JV         Subjective Comments    Subjective Comments Pt reports it has taken a long time for her to recover from GB surgery.  -JV         Subjective Pain    Able to rate subjective pain? yes  -JV      Pre-Treatment Pain Level 0  -JV      Post-Treatment Pain Level 0  -JV         Exercise 1    Exercise Name 1 Pt was assessed with 5XSTS, TUG, MILLER, LEFS and MMT  -JV         Exercise 2    Exercise Name 2 Pt was provided with verbal instructions and demo  "of lateral step ups on 4\" platform.  -JV      Cueing 2 Verbal;Demo  -JV      Sets 2 1  -JV      Reps 2 10  -JV                User Key  (r) = Recorded By, (t) = Taken By, (c) = Cosigned By      Initials Name Provider Type    Cleopatra Glynn, PT Physical Therapist                                Outcome Measure Options: 5x Sit to Stand, Neal Balance, Timed Up and Go (TUG), Lower Extremity Functional Scale (LEFS)  5 Times Sit to Stand  5 Times Sit to Stand (seconds): 21.18 seconds  5 Times Sit to Stand Comments: Previous score 19.47  Neal Balance Scale  Sitting to Standing: able to stand without using hands and stabilize independently  Standing Unsupported: able to stand safely for 2 minutes  Sitting with Back Unsupported but Feet Supported on Floor or on Stool: able to sit safely and securely for 2 minutes  Standing to Sitting: sits safely with minimal use of hands  Transfers: able to transfer safely with minor use of hands  Standing Unsupported with Eyes Closed: able to stand 10 seconds safely  Standing Unsupported with Feet Together: able to place feet together independently and stand 1 minute safely  Reaching Forward with Outstretched Arm While Standing: can reach forward confidently 25 cm (10 inches)   Object From the Floor From a Standing Position: able to  object safely and easily  Turning to Look Behind Over Left and Right Shoulders While Standing: looks behind from both sides and weight shifts well  Turn 360 Degrees: able to turn 360 degrees safely in 4 seconds or less  Place Alternate Foot on Step or Stool While Standing Unsupported: able to stand independently and safely and complete 8 steps in 20 seconds  Standing Unsupported with One Foot in Front: able to place foot tandem independently and hold 30 seconds  Standing on One Leg: able to lift leg independently and hold > 10 seconds  Neal Total Score: 56  Lower Extremity Functional Index  Any of your usual work, housework or school " activities: A little bit of difficulty  Your usual hobbies, recreational or sporting activities: Moderate difficulty  Getting into or out of the bath: A little bit of difficulty  Walking between rooms: A little bit of difficulty  Putting on your shoes or socks: A little bit of difficulty  Squatting: Quite a bit of difficulty  Lifting an object, like a bag of groceries from the floor: Moderate difficulty  Performing light activities around your home: A little bit of difficulty  Performing heavy activities around your home: Extreme difficulty or unable to perform activity  Getting into or out of a car: Moderate difficulty  Walking 2 blocks: Quite a bit of difficulty  Walking a mile: Extreme difficulty or unable to perform activity  Going up or down 10 stairs (about 1 flight of stairs): Quite a bit of difficulty  Standing for 1 hour: Moderate difficulty  Sitting for 1 hour: Moderate difficulty  Running on even ground: Extreme difficulty or unable to perform activity  Running on uneven ground: Extreme difficulty or unable to perform activity  Making sharp turns while running fast: Extreme difficulty or unable to perform activity  Hopping: Extreme difficulty or unable to perform activity  Rolling over in bed: Moderate difficulty  Total: 30  Timed Up and Go (TUG)  TUG Test 1: 8.57 seconds  TUG Test 2: 6.72 seconds  Timed Up and Go Comments: Previous scores 7.35 and 7.7    Time Calculation:   Start Time: 1300  Stop Time: 1345  Time Calculation (min): 45 min   Therapy Charges for Today       Code Description Service Date Service Provider Modifiers Qty    [id number] HC PT EVAL LOW COMPLEXITY 3 8/9/2023 Cleopatra Hoffmann, PT GP 1            PT G-Codes  Outcome Measure Options: 5x Sit to Stand, Neal Balance, Timed Up and Go (TUG), Lower Extremity Functional Scale (LEFS)  Neal Total Score: 56  Total: 30  TUG Test 1: 8.57 seconds  TUG Test 2: 6.72 seconds         Cleopatra Hoffmann, PT  8/9/2023

## 2023-08-10 ENCOUNTER — OFFICE VISIT (OUTPATIENT)
Dept: OBSTETRICS AND GYNECOLOGY | Facility: CLINIC | Age: 38
End: 2023-08-10
Payer: COMMERCIAL

## 2023-08-10 ENCOUNTER — APPOINTMENT (OUTPATIENT)
Dept: PHYSICAL THERAPY | Facility: HOSPITAL | Age: 38
End: 2023-08-10
Payer: COMMERCIAL

## 2023-08-10 VITALS
BODY MASS INDEX: 39.71 KG/M2 | WEIGHT: 253 LBS | HEIGHT: 67 IN | SYSTOLIC BLOOD PRESSURE: 118 MMHG | DIASTOLIC BLOOD PRESSURE: 82 MMHG

## 2023-08-10 DIAGNOSIS — Z11.51 SPECIAL SCREENING EXAMINATION FOR HUMAN PAPILLOMAVIRUS (HPV): ICD-10-CM

## 2023-08-10 DIAGNOSIS — Z01.419 WELL WOMAN EXAM WITH ROUTINE GYNECOLOGICAL EXAM: Primary | ICD-10-CM

## 2023-08-10 DIAGNOSIS — Z01.419 PAP SMEAR, LOW-RISK: ICD-10-CM

## 2023-08-10 DIAGNOSIS — Z13.89 SCREENING FOR GENITOURINARY CONDITION: ICD-10-CM

## 2023-08-10 RX ORDER — GABAPENTIN 400 MG/1
200 CAPSULE ORAL
COMMUNITY

## 2023-08-10 RX ORDER — ONDANSETRON 4 MG/1
4 TABLET, ORALLY DISINTEGRATING ORAL
COMMUNITY
Start: 2023-07-12

## 2023-08-10 RX ORDER — HYDROXYZINE PAMOATE 25 MG/1
CAPSULE ORAL
COMMUNITY
Start: 2023-02-20

## 2023-08-10 NOTE — PROGRESS NOTES
GYN Annual Exam     Chief Complaint   Patient presents with    Gynecologic Exam     annual       Delphine King is a 37 y.o. female who presents for annual well woman exam. She is new to me - yes . She is sexually active; s/p hysterectomy- still has partial cervix and both OV.      Periods are  absent . Episode of bleeding x 1 last year from vaginal area.  Went to colorectal surgeon at [facility]; said bleeding was from her cervix.  Colonoscopy was neg; +polyps; gets c-scope every 5 years.  Performing SBE: yes    Hx of rare autoimmune disorder- MMN; gets IVIG monthly. Hx of PE & DVT- on Xeralto.  Hx of rectovaginal fistula in 2016- s/p 6 surgeries to repair.        OB History          3    Para   1    Term   1       0    AB   2    Living   1         SAB   2    IAB   0    Ectopic   0    Molar   0    Multiple   0    Live Births   1                Last Pap : 2022- NIL  History of abnormal Pap smear: no  Family history of uterine, colon or ovarian cancer: no  Family history of breast cancer: yes - maternal aunt in her 40's; pt had genetic cancer testing- neg  History of abnormal mammogram:  N/A      Past Medical History:   Diagnosis Date    Anxiety     Asthma     Cholelithiasis     Colon polyp     Deep vein thrombosis     Depression     Fissure, anal 2017    Hyperlipidemia     Kidney stones     MMN (multifocal motor neuropathy)     Neuropathy     PE (pulmonary thromboembolism) 2017    Stroke        Past Surgical History:   Procedure Laterality Date    ANAL FISTULA REPAIR      multiple repairs     SECTION      CHOLECYSTECTOMY WITH INTRAOPERATIVE CHOLANGIOGRAM N/A 2023    Procedure: CHOLECYSTECTOMY LAPAROSCOPIC INTRAOPERATIVE CHOLANGIOGRAM;  Surgeon: Goldie Mandujano DO;  Location: [facility] OR;  Service: General;  Laterality: N/A;    ENDOSCOPY N/A 2023    Procedure: ESOPHAGOGASTRODUODENOSCOPY WITH COLD BX'S;  Surgeon: Mitch Barclay MD;  Location: [facility] ENDOSCOPY;  Service:  Gastroenterology;  Laterality: N/A;  pre: abdominal pain  post: gastritis    ERCP N/A 04/13/2023    Procedure: ENDOSCOPIC RETROGRADE CHOLANGIOPANCREATOGRAPHY WITH SPHINCTEROTOMY AND BALOON SWEEP;  Surgeon: Mitch Barclay MD;  Location: [facility] ENDOSCOPY;  Service: Gastroenterology;  Laterality: N/A;  pre: biliary obstruction, choledocholithiasis  post: choledocholithiasis    HYSTERECTOMY  2017    PORTACATH PLACEMENT Right     power port    TONSILLECTOMY  2003    WISDOM TOOTH EXTRACTION  2003         Current Outpatient Medications:     hydrOXYzine pamoate (VISTARIL) 25 MG capsule, , Disp: , Rfl:     immune globulin, human, (PRIVIGEN) 20 GM/200ML solution infusion, Infuse 113,000 mg into a venous catheter Every 30 (Thirty) Days., Disp: , Rfl:     ondansetron ODT (ZOFRAN-ODT) 4 MG disintegrating tablet, Take 1 tablet by mouth., Disp: , Rfl:     albuterol sulfate  (90 Base) MCG/ACT inhaler, Inhale 2 puffs Every 6 (Six) Hours As Needed for Wheezing., Disp: , Rfl:     atorvastatin (LIPITOR) 20 MG tablet, Take 1 tablet by mouth Every Night., Disp: , Rfl:     buPROPion XL (WELLBUTRIN XL) 300 MG 24 hr tablet, Take 1 tablet by mouth Daily., Disp: , Rfl:     cetirizine (zyrTEC) 10 MG tablet, Take 1 tablet by mouth Daily As Needed for Allergies., Disp: , Rfl:     cetirizine (zyrTEC) 5 MG tablet, Take 1 tablet by mouth Daily., Disp: , Rfl:     DULoxetine (CYMBALTA) 30 MG capsule, Take 1 capsule by mouth Daily., Disp: , Rfl:     gabapentin (NEURONTIN) 400 MG capsule, Take 200 mg by mouth., Disp: , Rfl:     hydrOXYzine (ATARAX) 25 MG tablet, Take 1 tablet by mouth At Night As Needed for Itching., Disp: , Rfl:     multivitamin with minerals tablet tablet, Take 1 tablet by mouth Daily., Disp: , Rfl:     pantoprazole (PROTONIX) 40 MG EC tablet, Take 1 tablet by mouth Daily., Disp: , Rfl:     rivaroxaban (XARELTO) 10 MG tablet, Take 1 tablet by mouth Daily., Disp: , Rfl:     solifenacin (VESICARE) 10 MG tablet, Take 1 tablet by  "mouth Daily., Disp: , Rfl:     Allergies   Allergen Reactions    Other Anaphylaxis and Hives     Seafood     Seafood    Penicillins Hives and Shortness Of Breath    Pineapple Hives, Anaphylaxis, Itching and Shortness Of Breath    Pseudoeph-Doxylamine-Dm-Apap Hives    Shellfish-Derived Products Anaphylaxis, Hives, Itching and Shortness Of Breath    Menthol Rash    Methyl Salicylate Rash    Vancomycin Itching and Hives     ITCHY     ITCHY    ITCHY    Fish-Derived Products Other (See Comments)     ANAPHYLAXIS seafoods as well. IVdye ok.    Icy Hot Other (See Comments)       Social History     Tobacco Use    Smoking status: Every Day     Packs/day: 0.50     Types: Cigarettes    Smokeless tobacco: Never   Vaping Use    Vaping Use: Former    Substances: Nicotine, THC, Flavoring    Devices: Disposable   Substance Use Topics    Alcohol use: Not Currently    Drug use: Not Currently     Types: Marijuana       Family History   Problem Relation Age of Onset    Depression Mother     Hypertension Mother     Breast cancer Maternal Aunt         dx'd in her 40's    Ovarian cancer Neg Hx     Uterine cancer Neg Hx     Colon cancer Neg Hx        Review of Systems   Gastrointestinal:  Negative for anal bleeding.   Genitourinary:  Negative for breast discharge, breast lump, breast pain, vaginal bleeding and vaginal pain.       /82   Ht 170.2 cm (67\")   Wt 115 kg (253 lb)   BMI 39.63 kg/mý     Physical Exam  Vitals reviewed.   Constitutional:       General: She is awake. She is not in acute distress.     Appearance: She is not ill-appearing.   HENT:      Head: Normocephalic and atraumatic.   Eyes:      Conjunctiva/sclera: Conjunctivae normal.   Cardiovascular:      Rate and Rhythm: Normal rate and regular rhythm.      Heart sounds: Normal heart sounds. No murmur heard.  Pulmonary:      Effort: Pulmonary effort is normal. No respiratory distress.      Breath sounds: Normal breath sounds.   Chest:   Breasts:     Right: Normal.      " Left: Normal.   Abdominal:      Palpations: Abdomen is soft. There is no mass.      Tenderness: There is no abdominal tenderness.   Genitourinary:     General: Normal vulva.      Exam position: Supine.      Pubic Area: No rash.       Labia:         Right: No rash.         Left: No rash.       Urethra: No urethral lesion.      Vagina: Normal.      Cervix: Normal.      Uterus: Absent.       Adnexa: Right adnexa normal and left adnexa normal.      Rectum: No mass.       Musculoskeletal:      Cervical back: Neck supple. No rigidity.   Lymphadenopathy:      Upper Body:      Right upper body: No axillary adenopathy.      Left upper body: No axillary adenopathy.      Lower Body: No right inguinal adenopathy. No left inguinal adenopathy.   Skin:     General: Skin is warm and dry.      Capillary Refill: Capillary refill takes less than 2 seconds.   Neurological:      Mental Status: She is alert and oriented to person, place, and time.   Psychiatric:         Mood and Affect: Mood and affect normal.         Behavior: Behavior normal.          Assessment     Well woman exam   Contraception management      Plan   Well woman exam: Pap collected Yes. Instructed on how to perform SBE. Recommend MVI daily.    Breast Health - Clinical breast exam & Self breast awareness.   Colon health - UTD   Contraception: s/p hysterectomy  STD: Enc condoms. Desires STD screen today- No.   Smoking status: current cigarette smoker- discouraged use  Body mass index is 39.63 kg/mý. Diet and exercised discussed.      I spent 15 minutes caring for Delphine on this date of service. This time includes time spent by me in the following activities: preparing for the visit, reviewing tests, obtaining and/or reviewing a separately obtained history, performing a medically appropriate examination and/or evaluation, counseling and educating the patient/family/caregiver, ordering medications, tests, or procedures, and documenting information in the medical  record      Xenia Roche, APRN  8/10/2023  11:04 EDT

## 2023-08-23 ENCOUNTER — HOSPITAL ENCOUNTER (OUTPATIENT)
Dept: PHYSICAL THERAPY | Facility: HOSPITAL | Age: 38
Setting detail: THERAPIES SERIES
Discharge: HOME | End: 2023-08-23
Payer: COMMERCIAL

## 2023-08-23 ENCOUNTER — HOSPITAL ENCOUNTER (OUTPATIENT)
Dept: OCCUPATIONAL THERAPY | Facility: HOSPITAL | Age: 38
Setting detail: THERAPIES SERIES
Discharge: HOME | End: 2023-08-23
Payer: COMMERCIAL

## 2023-08-23 DIAGNOSIS — G61.82 MMN (MULTIFOCAL MOTOR NEUROPATHY): Primary | ICD-10-CM

## 2023-08-23 DIAGNOSIS — G61.82 MULTIFOCAL MOTOR NEUROPATHY: Primary | ICD-10-CM

## 2023-08-23 DIAGNOSIS — G61.82 MMN (MULTIFOCAL MOTOR NEUROPATHY): ICD-10-CM

## 2023-08-23 PROCEDURE — 97168 OT RE-EVAL EST PLAN CARE: CPT

## 2023-08-23 PROCEDURE — 97530 THERAPEUTIC ACTIVITIES: CPT

## 2023-08-23 NOTE — THERAPY RE-EVALUATION
Outpatient Occupational Therapy Neuro Re-Evaluation   Robyn Karimi     Patient Name: Delphine King  : 1985  MRN: [id number]  Today's Date: 2023      Visit Date: 2023    Patient Active Problem List   Diagnosis    Rectovaginal fistula: s/p repair in     History of pulmonary embolism: lifelong anticoagulation    MMN (multifocal motor neuropathy)    Morbid (severe) obesity due to excess calories (*specify comorbidity)        Past Medical History:   Diagnosis Date    Anxiety     Asthma     Cholelithiasis     Colon polyp     Deep vein thrombosis     Depression     Fissure, anal 2017    Hyperlipidemia     Kidney stones     MMN (multifocal motor neuropathy)     Neuropathy     PE (pulmonary thromboembolism) 2017    Stroke         Past Surgical History:   Procedure Laterality Date    ANAL FISTULA REPAIR      multiple repairs     SECTION      CHOLECYSTECTOMY WITH INTRAOPERATIVE CHOLANGIOGRAM N/A 2023    Procedure: CHOLECYSTECTOMY LAPAROSCOPIC INTRAOPERATIVE CHOLANGIOGRAM;  Surgeon: Goldie Mandujano DO;  Location: [facility];  Service: General;  Laterality: N/A;    ENDOSCOPY N/A 2023    Procedure: ESOPHAGOGASTRODUODENOSCOPY WITH COLD BX'S;  Surgeon: Mitch Barclay MD;  Location: [facility] ENDOSCOPY;  Service: Gastroenterology;  Laterality: N/A;  pre: abdominal pain  post: gastritis    ERCP N/A 2023    Procedure: ENDOSCOPIC RETROGRADE CHOLANGIOPANCREATOGRAPHY WITH SPHINCTEROTOMY AND BALOON SWEEP;  Surgeon: Mitch Barclay MD;  Location: [facility] ENDOSCOPY;  Service: Gastroenterology;  Laterality: N/A;  pre: biliary obstruction, choledocholithiasis  post: choledocholithiasis    HYSTERECTOMY  2017    PORTACATH PLACEMENT Right     power port    TONSILLECTOMY  2003    WISDOM TOOTH EXTRACTION  2003         Visit Dx:      ICD-10-CM ICD-9-CM   1. Multifocal motor neuropathy  G61.82 357.89   2. MMN (multifocal motor neuropathy) (HCC): Receieved IVIG  G61.82 357.89            OT Neuro   "     Row Name 08/23/23 0900             Subjective Comments    Subjective Comments Patient reports her doctor wants her to have measurements prior to IVIG infusions. Patient states she goes for infusions next week. Patient states the last couple days she's been dropping stuff and tripping more. Patient also reports her right wrist drop worsens right before infusions and states she is more fatigued the week before and week of infusions. Patient states \"Sometimes the week before infusions I'm fine but other times I'm not.\"  -EN         Subjective Pain    Subjective Pain Comment Patient reports she's been seeing a P.T. for L shoulder pain.  -EN         Posture/Observations    Posture/Observations Comments Patient wearing Right AFO today. States she had to put it on because she's been tripping more.  -EN         Right Upper Ext    Rt Shoulder Abduction AROM WFL  -EN      Rt Shoulder Flexion AROM WFL  -EN      Rt Elbow Supination AROM WFL  -EN      Rt Elbow Pronation AROM WFL  -EN      Rt Wrist Flexion AROM WFL  -EN      Rt Wrist Extension AROM history of wrist drop(10 years), able to extend wrist with effort however unable to fully extend digits 3-5  -EN         Left Upper Ext    Lt Shoulder Abduction AROM WFL  -EN      Lt Shoulder Flexion AROM WFL  -EN      Lt Elbow Extension/Flexion AROM WFL  -EN      Lt Elbow Supination AROM WFL  -EN      Lt Elbow Pronation AROM WFL  -EN      Lt Wrist Flexion AROM WFL  -EN      Lt Wrist Extension AROM WFL  -EN         MMT Right Upper Ext    Rt Shoulder Flexion MMT, Gross Movement (4/5) good  -EN      Rt Shoulder Extension MMT, Gross Movement (4+/5) good plus  -EN      Rt Shoulder ABduction MMT, Gross Movement (4/5) good  -EN      Rt Shoulder ADduction MMT, Gross Movement (4+/5) good plus  -EN      Rt Elbow Flexion MMT, Gross Movement: (5/5) normal  -EN      Rt Elbow Extension MMT, Gross Movement: (4+/5) good plus  -EN      Rt Forearm Supination MMT, Gross Movement (4/5) good  -EN   "    Rt Forearm Pronation MMT, Gross Movement (4+/5) good plus  -EN      Rt Wrist Flexion MMT, Gross Movement (4-/5) good minus  -EN      Rt Wrist Extension MMT, Gross Movement (3/5) fair  -EN         MMT Left Upper Ext    Lt Shoulder Flexion MMT, Gross Movement (5/5) normal  -EN      Lt Shoulder Extension MMT, Gross Movement (5/5) normal  -EN      Lt Shoulder ABduction MMT, Gross Movement (5/5) normal  -EN      Lt Shoulder ADduction MMT, Gross Movement (5/5) normal  -EN      Lt Elbow Flexion MMT, Gross Movement (5/5) normal  -EN      Lt Elbow Extension MMT, Gross Movement (5/5) normal  -EN      Lt Forearm Supination MMT, Gross Movement (5/5) normal  -EN      Lt Forearm Pronation MMT, Gross Movement (5/5) normal  -EN      Lt Wrist Flexion MMT, Gross Movement (5/5) normal  -EN      Lt Wrist Extension MMT, Gross Movement (5/5) normal  -EN                User Key  (r) = Recorded By, (t) = Taken By, (c) = Cosigned By      Initials Name Provider Type    EN Diana Richmond, OTR Occupational Therapist                    Hand Therapy (last 24 hours)       Hand Eval       Row Name 08/23/23 0900             Left Flexion AROM    II- MP AROM 85  -EN      II- PIP AROM 101  -EN      II- DIP AROM 80  -EN      II- FREDERICK Left Flexion AROM 266  -EN      III- MP AROM 80  -EN      III- PIP AROM 103  -EN      III- DIP AROM 82  -EN      III- FREDERICK Left Flexion AROM 265  -EN      IV- MP AROM 75  -EN      IV- PIP AROM 103  -EN      IV- DIP AROM 80  -EN      IV- FREDERICK Left Flexion AROM 258  -EN      V- MP AROM 85  -EN      V- PIP AROM 101  -EN      V- DIP AROM 80  -EN      V- FREDERICK Left Flexion AROM 266  -EN         Right Extension AROM    II- PIP AROM -30  -EN      II- DIP AROM 0  -EN      III- MP AROM -80  -EN      III- PIP AROM -50  -EN      IV- MP AROM -72  -EN      IV- PIP AROM -62  -EN      V- MP AROM -85  -EN      V- PIP AROM -30  -EN         Right Flexion AROM    II- MP AROM 76  -EN      II- PIP AROM 105  -EN      II- DIP AROM 56  -EN    "   II- FREDERICK Right Flexion AROM 237  -EN      III- MP AROM 84  -EN      III- PIP AROM 99  -EN      III- DIP AROM 55  -EN      III- FREDERICK Right Flexion AROM 238  -EN      IV- MP AROM 80  -EN      IV- PIP AROM 100  -EN      IV- DIP AROM 41  -EN      IV- FREDERICK Right Flexion AROM 221  -EN      V- MP AROM 70  -EN      V- PIP AROM 95  -EN      V- DIP AROM 52  -EN      V- FREDERICK Right Flexion AROM 217  -EN         Right Thumb AROM    MP Flexion - AROM 46  -EN      IP Flexion - AROM 51  -EN         Left Thumb AROM    MP Flexion - AROM 56  -EN      IP Flexion - AROM 75  -EN          Strength Right    # Reps 3  -EN      Right Rung 2  -EN      Right  Test 1 9  -EN      Right  Test 2 19  -EN      Right  Test 3 12  -EN       Strength Average Right 13.33  -EN          Strength Left    # Reps 3  -EN      Left Rung 2  -EN      Left  Test 1 35  -EN      Left  Test 2 21  -EN      Left  Test 3 50  -EN       Strength Average Left 35.33  -EN         Right Hand Strength - Pinch (lbs)    Lateral 6 lbs  6,6,6  -EN      Tip (3 point) --  8,7,8  -EN         Left Hand Strength - Pinch (lbs)    Lateral 12 lbs  12,12,12  -EN      Tip (3 point) --  12,15,16  -EN         Therapy Education    Education Details green/yellow putty given for days when pt feels \"stronger\" (states somedays yellow putty is good but somedays she feels she could do more). Patient also given green theraband to use on \"good days\"  -EN      Given HEP;Symptoms/condition management  -EN      Program Reinforced;Progressed  -EN      How Provided Verbal;Demonstration  -EN      Provided to Patient  -EN      Level of Understanding Teach back education performed;Demonstrated;Verbalized  -EN                User Key  (r) = Recorded By, (t) = Taken By, (c) = Cosigned By      Initials Name Provider Type    Diana Grimes OTR Occupational Therapist                        Therapy Education  Education Details: green/yellow putty given for days when " "pt feels \"stronger\" (states somedays yellow putty is good but somedays she feels she could do more). Patient also given green theraband to use on \"good days\"  Given: HEP, Symptoms/condition management  Program: Reinforced, Progressed  How Provided: Verbal, Demonstration  Provided to: Patient  Level of Understanding: Teach back education performed, Demonstrated, Verbalized                OT Assessment/Plan       Row Name 23 1125 23 1120       OT Assessment    Functional Limitations -- Limitation in home management;Limitations in community activities;Performance in leisure activities;Performance in self-care ADL  -EN    Impairments -- Coordination;Dexterity;Muscle strength;Pain;Range of motion  -EN    Assessment Comments Patient reports MD would now like her to have measurements prior to IVIG infusions. Patient reports she notices weakness sometimes the week before infusions but not consistently. Patient states the past couple of days she's been dropping things and tripping more. Patient is using her cane today and wearing her AFO.  Patient did have a decrease in right wrist strength and decrease in finger extension in R digits 3, 4, and 5. Patient also demonstrated declines in 9 Hole peg scores in both hands and in  and pinch scores.  Measurements as follow: Right : 22 - 2#,  - 16#,  - 18#, 23 - 21.33#, 3/21 - 20 #, 23 - 26.33# 23 - 13.33#.   Left : 22 - 22#  - 32#,  - 52.3#, 23- 56#, 3/21 - 41.66#, 23 - 55 #, 23 - 35.33#.   Right lateral pinch: 22 - 3#,  - 7.3#,  - 7#, 23 6#, 3/21 - 7#, 23 - 7#, 23 - 6#.  Left lateral pinch: 22 - 7.3#,  - 9.6#,  - 11.3#, 23 - 13.3#, 3/21 - 11.66# ,  - 12.33#, 23  - 12#.  Right 9 Hole Pe23 - 30.56 seconds,  - 18.78 seconds,  - 19.33 seconds, 23 - 18.94 seconds, 3/21 - 21.10 seconds, 23 - 20.77 seconds, 23 -  25.97 " "seconds.   Left 9 Hole Pe23 - 22.75 seconds,  -  19.42 seconds,  - 18.95 seconds, 23 - 18.16 seconds, 3/21 - 18.39 seconds, 23 - 16.78 seconds, 23 - 18.81 seconds.  Encouraged patient to continue with HEP. IVIG infusions are scheduled for next week.  -EN --    OT Diagnosis MMN, decreaesd UE functional use (especially R upper extremity)  -EN --    OT Rehab Potential Good  -EN --       OT Plan    OT Frequency --  Reassessment every 4 weeks prior to IVIG infusions.  -EN --    Predicted Duration of Therapy Intervention (OT) 6 months  -EN --    Planned Therapy Interventions (Optional Details) home exercise program;patient/family education;strengthening  -EN --    OT Plan Comments Continue with plan.  -EN --              User Key  (r) = Recorded By, (t) = Taken By, (c) = Cosigned By      Initials Name Provider Type    Diana Grimes OTR Occupational Therapist                   OT Exercises       Row Name 23 0900             Exercise 1    Exercise Name 1 Review of HEP, green/yellow theraputty mix issued and green theraband to use on \"good days\"  -EN                User Key  (r) = Recorded By, (t) = Taken By, (c) = Cosigned By      Initials Name Provider Type    Diana Grimes OTR Occupational Therapist                      9 Hole Peg  9-Hole Peg Left: 18.81  9-Hole Peg Right: 25.97  Quick DASH  Open a tight or new jar.: Mild Difficulty  Do heavy household chores (e.g., wash walls, wash floors): Severe Difficulty  Carry a shopping bag or briefcase: Moderate Difficulty  Wash your back: Unable  Use a knife to cut food: Moderate Difficulty  Recreational activities in which you take some force or impact through your arm, should or hand (e.g. golf, hammering, tennis, etc.): Unable  During the past week, to what extent has your arm, shoulder, or hand problem interfered with your normal social activites with family, friends, neighbors or groups?: Slightly  During the past " week, were you limited in your work or other regular daily activities as a result of your arm, shoulder or hand problem?: Slightly Limited  Arm, Shoulder, or hand pain: Moderate  Tingling (pins and needles) in your arm, shoulder, or hand: Mild  During the past week, how much difficulty have you had sleeping because of the pain in your arm, shoulder or hand?: Mild Difficulty  Number of Questions Answered: 11  Quick DASH Score: 50         Time Calculation:   OT Start Time: 0905  OT Stop Time: 1000  OT Time Calculation (min): 55 min     Therapy Charges for Today       Code Description Service Date Service Provider Modifiers Qty    77281236166  OT RE-EVAL 2 8/23/2023 Diana Richmond OTR GO 1                       JESSY Medina  8/23/2023

## 2023-08-23 NOTE — THERAPY TREATMENT NOTE
Outpatient Physical Therapy Neuro Progress Note   [place]     Patient Name: Delphine King  : 1985  MRN: [id number]  Today's Date: 2023      Visit Date: 2023    Visit Dx:    ICD-10-CM ICD-9-CM   1. MMN (multifocal motor neuropathy) ([facility]): Receieved IVIG  G61.82 357.89       Patient Active Problem List   Diagnosis    Rectovaginal fistula: s/p repair in 2016    History of pulmonary embolism: lifelong anticoagulation    MMN (multifocal motor neuropathy)    Morbid (severe) obesity due to excess calories (*specify comorbidity)                        PT Assessment/Plan       Row Name 23 1510          PT Assessment    Functional Limitations Decreased safety during functional activities;Impaired gait;Limitation in home management;Limitations in community activities;Performance in leisure activities;Performance in self-care ADL  -JV     Impairments Balance;Coordination;Endurance;Gait;Impaired muscle endurance;Motor function;Muscle strength  -JV     Assessment Comments The pt presents for assessment this date toward the end of infusion cycle. Pt is due to receive next IVIG treatment on 23. Pt reports more effort is required to perform mobility tasks the last week before infusion. Pt is wearing AFO on R LE this date. Gait is noted to be slower with more exaggerated Trendelenbeg sign on the R LE. Pt was assessed using the LEFS, 5XSTS, TUG, MILLER, and DGI. Score on  LEFS decreased from 30 to 26 points, 5XSTS increased from 21.18 to 29.9 sec, TUG increased from avg 7.6 to 11.8 sec, MILLER score was unchanged, DGI first admin score was 15/24 points. Scores below 19 indicate increased fall risk. Overall, pt appeared to have more difficulty with mobility skills, gait speed and quality were decreased.  -JV     Rehab Potential Good  -JV     Patient/caregiver participated in establishment of treatment plan and goals Yes  -JV     Patient would benefit from skilled therapy intervention Yes  -JV        PT  Plan    PT Plan Comments Assess monthly as needed, progress HEP as vishal.  -JV               User Key  (r) = Recorded By, (t) = Taken By, (c) = Cosigned By      Initials Name Provider Type    Cleopatra Glynn PT Physical Therapist                        OP Exercises       Row Name 08/23/23 1000             Precautions    Existing Precautions/Restrictions fall  -JV         Subjective Comments    Subjective Comments Pt reports her next IVIG infusion is scheduled for 8/29.  -JV         Exercise 1    Exercise Name 1 Pt was assessed with 5XSTS, TUG, MILLER, LEFS and DGI.  -JV                User Key  (r) = Recorded By, (t) = Taken By, (c) = Cosigned By      Initials Name Provider Type    Cleopatra Glynn PT Physical Therapist                                    Therapy Education  Education Details: Cont HEP  Given: HEP  Program: Reinforced  How Provided: Verbal  Provided to: Patient  Level of Understanding: Teach back education performed, Verbalized    Outcome Measure Options: 5x Sit to Stand, Miller Balance, Timed Up and Go (TUG), Lower Extremity Functional Scale (LEFS), Dynamic Gait Index  5 Times Sit to Stand  5 Times Sit to Stand (seconds): 29.9 seconds  5 Times Sit to Stand Comments: Previous score 21.18  Miller Balance Scale  Sitting to Standing: able to stand without using hands and stabilize independently  Standing Unsupported: able to stand safely for 2 minutes  Sitting with Back Unsupported but Feet Supported on Floor or on Stool: able to sit safely and securely for 2 minutes  Standing to Sitting: sits safely with minimal use of hands  Transfers: able to transfer safely with minor use of hands  Standing Unsupported with Eyes Closed: able to stand 10 seconds safely  Standing Unsupported with Feet Together: able to place feet together independently and stand 1 minute safely  Reaching Forward with Outstretched Arm While Standing: can reach forward confidently 25 cm (10 inches)   Object From the Floor From a  Standing Position: able to  object safely and easily  Turning to Look Behind Over Left and Right Shoulders While Standing: looks behind from both sides and weight shifts well  Turn 360 Degrees: able to turn 360 degrees safely in 4 seconds or less  Place Alternate Foot on Step or Stool While Standing Unsupported: able to stand independently and safely and complete 8 steps in 20 seconds  Standing Unsupported with One Foot in Front: able to place foot tandem independently and hold 30 seconds  Standing on One Leg: able to lift leg independently and hold > 10 seconds  Neal Total Score: 56  Neal Comments: No change  Dynamic Gait Index (DGI)  Gait Level Surface: Mild impairment: walks 20', uses assistive devices, slower speed, mild gait deviations  Change in Gait Speed: Moderate impairment: Makes only minor adjustments to walking speed, or accomplishes a change in speed with significant gait deviations, or changes speed but loses balance but is able to recover and continue walking.  Gait with Horizontal Head Turns: Mild impairment: Performs head turns smoothly with slight change in gait velocity, i.e. minor disruption to smooth gait path or uses walking aid.  Gait with Vertical Head Turns: Mild impairment: Performs head turns smoothly with slight change in gait velocity, i.e. minor disruption to smooth gait path or uses walking aid.  Gait and Pivot Turn: Mild impairment: pivot turns safely in >3 seconds and stops with no loss of balance.  Step Over Obstacle: Mild impairment: Is able to step over shoe box, but must slow down and adjust steps to clear box safely.  Step Around Obstacles: Normal: Is able to walk safely around cones safely without changing gait speed, no evidence of imbalance.  Steps: Moderate impairment: Two feet to a stair, must use rail.  Dynamic Gait Index Score: 15  Dynamic Gait Index Comments: First admin  Lower Extremity Functional Index  Any of your usual work, housework or school activities: A  little bit of difficulty  Your usual hobbies, recreational or sporting activities: Moderate difficulty  Getting into or out of the bath: A little bit of difficulty  Walking between rooms: A little bit of difficulty  Putting on your shoes or socks: A little bit of difficulty  Squatting: Extreme difficulty or unable to perform activity  Lifting an object, like a bag of groceries from the floor: Quite a bit of difficulty  Performing light activities around your home: A little bit of difficulty  Performing heavy activities around your home: Extreme difficulty or unable to perform activity  Getting into or out of a car: Moderate difficulty  Walking 2 blocks: Quite a bit of difficulty  Walking a mile: Extreme difficulty or unable to perform activity  Going up or down 10 stairs (about 1 flight of stairs): Quite a bit of difficulty  Standing for 1 hour: Quite a bit of difficulty  Sitting for 1 hour: Quite a bit of difficulty  Running on even ground: Extreme difficulty or unable to perform activity  Running on uneven ground: Extreme difficulty or unable to perform activity  Making sharp turns while running fast: Extreme difficulty or unable to perform activity  Hopping: Extreme difficulty or unable to perform activity  Rolling over in bed: Moderate difficulty  Total: 26  Timed Up and Go (TUG)  TUG Test 1: 12.19 seconds  TUG Test 2: 11.4 seconds  Timed Up and Go Comments: Previous scores 8.57 and 6.72      Time Calculation:   Start Time: 1000  Stop Time: 1045  Time Calculation (min): 45 min   Therapy Charges for Today       Code Description Service Date Service Provider Modifiers Qty    84519771248  PT THERAPEUTIC ACT EA 15 MIN 8/23/2023 Cleopatra Hoffmann, PT GP 3            PT G-Codes  Outcome Measure Options: 5x Sit to Stand, Neal Balance, Timed Up and Go (TUG), Lower Extremity Functional Scale (LEFS), Dynamic Gait Index  Neal Total Score: 56  Total: 26  TUG Test 1: 12.19 seconds  TUG Test 2: 11.4 seconds         Cleopatra  Tahira, PT  8/23/2023

## 2023-09-20 ENCOUNTER — HOSPITAL ENCOUNTER (OUTPATIENT)
Dept: OCCUPATIONAL THERAPY | Facility: HOSPITAL | Age: 38
Setting detail: THERAPIES SERIES
Discharge: HOME | End: 2023-09-20
Payer: COMMERCIAL

## 2023-09-20 ENCOUNTER — HOSPITAL ENCOUNTER (OUTPATIENT)
Dept: PHYSICAL THERAPY | Facility: HOSPITAL | Age: 38
Setting detail: THERAPIES SERIES
Discharge: HOME | End: 2023-09-20
Payer: COMMERCIAL

## 2023-09-20 DIAGNOSIS — G61.82 MMN (MULTIFOCAL MOTOR NEUROPATHY): Primary | ICD-10-CM

## 2023-09-20 DIAGNOSIS — G61.82 MULTIFOCAL MOTOR NEUROPATHY: Primary | ICD-10-CM

## 2023-09-20 DIAGNOSIS — G61.82 MMN (MULTIFOCAL MOTOR NEUROPATHY): ICD-10-CM

## 2023-09-20 PROCEDURE — 97530 THERAPEUTIC ACTIVITIES: CPT

## 2023-09-20 PROCEDURE — 97168 OT RE-EVAL EST PLAN CARE: CPT

## 2023-09-20 NOTE — THERAPY RE-EVALUATION
Outpatient Occupational Therapy Neuro Re-Evaluation   Robyn Karimi     Patient Name: Delphine King  : 1985  MRN: [id number]  Today's Date: 2023      Visit Date: 2023    Patient Active Problem List   Diagnosis    Rectovaginal fistula: s/p repair in     History of pulmonary embolism: lifelong anticoagulation    MMN (multifocal motor neuropathy)    Morbid (severe) obesity due to excess calories (*specify comorbidity)        Past Medical History:   Diagnosis Date    Anxiety     Asthma     Cholelithiasis     Colon polyp     Deep vein thrombosis     Depression     Fissure, anal 2017    Hyperlipidemia     Kidney stones     MMN (multifocal motor neuropathy)     Neuropathy     PE (pulmonary thromboembolism) 2017    Stroke         Past Surgical History:   Procedure Laterality Date    ANAL FISTULA REPAIR      multiple repairs     SECTION      CHOLECYSTECTOMY WITH INTRAOPERATIVE CHOLANGIOGRAM N/A 2023    Procedure: CHOLECYSTECTOMY LAPAROSCOPIC INTRAOPERATIVE CHOLANGIOGRAM;  Surgeon: Goldie Mandujano DO;  Location: [facility];  Service: General;  Laterality: N/A;    ENDOSCOPY N/A 2023    Procedure: ESOPHAGOGASTRODUODENOSCOPY WITH COLD BX'S;  Surgeon: Mitch Barclay MD;  Location: [facility] ENDOSCOPY;  Service: Gastroenterology;  Laterality: N/A;  pre: abdominal pain  post: gastritis    ERCP N/A 2023    Procedure: ENDOSCOPIC RETROGRADE CHOLANGIOPANCREATOGRAPHY WITH SPHINCTEROTOMY AND BALOON SWEEP;  Surgeon: Mitch Barclay MD;  Location: [facility] ENDOSCOPY;  Service: Gastroenterology;  Laterality: N/A;  pre: biliary obstruction, choledocholithiasis  post: choledocholithiasis    HYSTERECTOMY  2017    PORTACATH PLACEMENT Right     power port    TONSILLECTOMY  2003    WISDOM TOOTH EXTRACTION  2003         Visit Dx:      ICD-10-CM ICD-9-CM   1. Multifocal motor neuropathy  G61.82 357.89   2. MMN (multifocal motor neuropathy) (HCC): Receieved IVIG  G61.82 357.89            OT Neuro        Row Name 09/20/23 0900             Subjective Comments    Subjective Comments Patient reports she has IVIG infusions next week. Reports she has increased weakness and more difficulty with fine motor activities about a week before IVIG. Patient reports she occasionally drops things with the right hand.  -EN         Subjective Pain    Subjective Pain Comment Patient reports pain in the left shoulder continues and she is currently in physical therapy 2 X per week for the left shoulder.  -EN         Posture/Observations    Posture/Observations Comments Patient wearing right AFO. Continues to have right wrist drop, limited finger extension in the right hand.  -EN         Coordination    9-Hole Peg Left 15.35  -EN      9-Hole Peg Right 24.97  -EN         Right Upper Ext    Rt Shoulder Abduction AROM WFL  -EN      Rt Shoulder Flexion AROM WFL  -EN      Rt Elbow Supination AROM WFL  -EN      Rt Elbow Pronation AROM WFL  -EN      Rt Wrist Flexion AROM WFL  -EN      Rt Wrist Extension AROM history of wrist drop X 10 years, able to extend wrist with effort however unable to fully extend digits 2 and 3.  -EN         Left Upper Ext    Lt Shoulder Abduction AROM WFL  -EN      Lt Shoulder Flexion AROM WFL  -EN      Lt Elbow Extension/Flexion AROM WFL  -EN      Lt Elbow Supination AROM WFL  -EN      Lt Elbow Pronation AROM WFL  -EN      Lt Wrist Flexion AROM WFL  -EN      Lt Wrist Extension AROM WFL  -EN         MMT Right Upper Ext    Rt Shoulder Flexion MMT, Gross Movement (4/5) good  -EN      Rt Shoulder Extension MMT, Gross Movement (4/5) good  -EN      Rt Shoulder ABduction MMT, Gross Movement (4/5) good  -EN      Rt Shoulder ADduction MMT, Gross Movement (4/5) good  -EN      Rt Elbow Flexion MMT, Gross Movement: (4+/5) good plus  -EN      Rt Elbow Extension MMT, Gross Movement: (4+/5) good plus  -EN      Rt Forearm Supination MMT, Gross Movement (4+/5) good plus  -EN      Rt Forearm Pronation MMT, Gross Movement (4-/5)  good minus  -EN      Rt Wrist Flexion MMT, Gross Movement (3/5) fair  -EN      Rt Wrist Extension MMT, Gross Movement (3/5) fair  -EN         MMT Left Upper Ext    Lt Shoulder Flexion MMT, Gross Movement (4+/5) good plus  -EN      Lt Shoulder Extension MMT, Gross Movement (5/5) normal  -EN      Lt Shoulder ABduction MMT, Gross Movement (4+/5) good plus  -EN      Lt Shoulder ADduction MMT, Gross Movement (5/5) normal  -EN      Lt Elbow Flexion MMT, Gross Movement (5/5) normal  -EN      Lt Elbow Extension MMT, Gross Movement (5/5) normal  -EN      Lt Forearm Supination MMT, Gross Movement (5/5) normal  -EN      Lt Forearm Pronation MMT, Gross Movement (5/5) normal  -EN      Lt Wrist Flexion MMT, Gross Movement (5/5) normal  -EN      Lt Wrist Extension MMT, Gross Movement (5/5) normal  -EN                User Key  (r) = Recorded By, (t) = Taken By, (c) = Cosigned By      Initials Name Provider Type    EN Diana Richmond OTR Occupational Therapist                    Hand Therapy (last 24 hours)       Hand Eval       Row Name 09/20/23 0915             Left Flexion AROM    II- MP AROM 90  -EN      II- PIP AROM 98  -EN      II- DIP AROM 75  -EN      II- FREDERICK Left Flexion AROM 263  -EN      III- MP AROM 90  -EN      III- PIP AROM 104  -EN      III- DIP AROM 90  -EN      III- FREDERICK Left Flexion AROM 284  -EN      IV- MP AROM 75  -EN      IV- PIP AROM 104  -EN      IV- DIP AROM 80  -EN      IV- FREDERICK Left Flexion AROM 259  -EN      V- MP AROM 91  -EN      V- PIP AROM 103  -EN      V- DIP AROM 84  -EN      V- FREDERICK Left Flexion AROM 278  -EN         Right Extension AROM    II- MP AROM 0  -EN      II- PIP AROM -17  -EN      II- DIP AROM 0  -EN      II- FREDERICK Right Extension AROM -17  -EN      III- MP AROM -65  -EN      III- PIP AROM -35  -EN      III- DIP AROM 0  -EN      III- FREDERICK Right Extension AROM -100  -EN      IV- MP AROM -53  -EN      IV- PIP AROM -40  -EN      IV- DIP AROM 0  -EN      IV- FREDERICK Right Extension AROM -93  -EN       V- MP AROM 0  -EN      V- PIP AROM -21  -EN      V- DIP AROM 0  -EN      V- FREDERICK Right Extension AROM -21  -EN         Right Flexion AROM    II- MP AROM 83  -EN      II- PIP AROM 103  -EN      II- DIP AROM 50  -EN      II- FRED[place]ICK Right Flexion AROM 236  -EN      III- MP AROM 91  -EN      III- PIP AROM 101  -EN      III- DIP AROM 61  -EN      III- FRED[place]ICK Right Flexion AROM 253  -EN      IV- MP AROM 82  -EN      IV- PIP AROM 100  -EN      IV- DIP AROM 54  -EN      IV- FREDERICK Right Flexion AROM 236  -EN      V- MP AROM 86  -EN      V- PIP AROM 100  -EN      V- DIP AROM 60  -EN      V- FREDERICK Right Flexion AROM 246  -EN         Right Thumb AROM    MP Flexion - AROM 57  -EN      IP Flexion - AROM 55  -EN         Left Thumb AROM    MP Flexion - AROM 54  -EN      IP Flexion - AROM 80  -EN          Strength Right    # Reps 3  -EN      Right Rung 2  -EN      Right  Test 1 27  -EN      Right  Test 2 24  -EN      Right  Test 3 27  -EN       Strength Average Right 26  -EN          Strength Left    # Reps 3  -EN      Left Rung 2  -EN      Left  Test 1 37  -EN      Left  Test 2 46  -EN      Left  Test 3 46  -EN       Strength Average Left 43  -EN         Pinch Strength    Number of Reps 3  -EN      Affected Side Bilateral  -EN         Right Hand Strength - Pinch (lbs)    Lateral 6.3 lbs  8, 7, 4  -EN      Tip (3 point) 5.33 lbs  4,6,6  -EN         Left Hand Strength - Pinch (lbs)    Lateral 10.67 lbs  11, 9, 12  -EN      Tip (3 point) 13.33 lbs  14, 13,13  -EN         Therapy Education    Education Details Continue with HEP and compensatory strategies/adaptive equipment for improved ADL independence.  -EN      Given HEP;Symptoms/condition management  -EN      Program Reinforced  -EN      How Provided Verbal;Demonstration  -EN      Provided to Patient  -EN      Level of Understanding Teach back education performed;Verbalized;Demonstrated  -EN                User Key  (r) = Recorded By, (t) = Taken  By, (c) = Cosigned By      Initials Name Provider Type    Diana Grimes, OTR Occupational Therapist                        Therapy Education  Education Details: Continue with HEP and compensatory strategies/adaptive equipment for improved ADL independence.  Given: HEP, Symptoms/condition management  Program: Reinforced  How Provided: Verbal, Demonstration  Provided to: Patient  Level of Understanding: Teach back education performed, Verbalized, Demonstrated                OT Assessment/Plan       Row Name 23 1369          OT Assessment    Assessment Comments Patient reports she has IVIG infusions next week and states this week she does feel weak and continues to drop things occasionally with the right hand. Patient states she is able to perform ADLs/IADLs with extended time and use of adaptive equipment and compensatory strategies. Patient reports she continues to perform strengthening exercises. Patient current and past measurements as follows: Right : 22 - 2#,  - 16#,  - 18#, 23 - 21.33#, 3/21 - 20 #, 23 - 26.33#,  23 - 13.33#,  23  - #.. Left : 22 - #  - 32#,  - 52.3#, 23- 56#, 3/21 - 41.66#, 23 - 55 #, 23 - 35.33#, 23 - 43#. Right lateral pinch: 22 - 3#,  - 7.3#,  - 7#, 23 6#, 3/21 - 7#, 23 - 7#, 23 - 6#, 23 - 6.3#. Left lateral pinch: 22 - 7.3#,  - 9.6#,  - 11.3#, 23 - 13.3#, 3/21 - 11.66# ,  - 12.33#, 23 - 12#, 23 - 10.67#.  Right 9 Hole Pe23 - 30.56 seconds,  - 18.78 seconds,  - 19.33 seconds, 23 - 18.94 seconds, 3/21 - 21.10 seconds, 23 - 20.77 seconds, 23 - 25.97 seconds, 23 - 24.97 seconds. Left 9 Hole Pe23 - 22.75 seconds,  - 19.42 seconds,  - 18.95 seconds, 23 - 18.16 seconds, 3/21 - 18.39 seconds, 23 - 16.78 seconds, 23 - 18.81 seconds, 23 - 24.97 seconds.  -EN     OT  Diagnosis MMN, decreased B UE functional use (especially R UE)  -EN     OT Rehab Potential Good  -EN     Patient/caregiver participated in establishment of treatment plan and goals Yes  -EN     Patient would benefit from skilled therapy intervention Yes  -EN        OT Plan    OT Frequency --  Reassessment every 4 weeks prior to IVIG infusions per MD order  -EN     Predicted Duration of Therapy Intervention (OT) 6 months  -EN     Planned CPT's? OT EVAL LOW COMPLEXITY: 52754;OT THER ACT EA 15 MIN: [id number]  -EN     Planned Therapy Interventions (Optional Details) home exercise program;patient/family education;strengthening  -EN     OT Plan Comments Continue with plan of care.  -EN               User Key  (r) = Recorded By, (t) = Taken By, (c) = Cosigned By      Initials Name Provider Type    Diana Grimes OTR Occupational Therapist                   OT Exercises       Row Name 09/20/23 0900             Exercise 1    Exercise Name 1 review of HEP  -EN         Exercise 2    Exercise Name 2 Review of compensatory strategies and adaptive equipment for improved ADL/IADLs  -EN                User Key  (r) = Recorded By, (t) = Taken By, (c) = Cosigned By      Initials Name Provider Type    Diana Grimes OTR Occupational Therapist                      9 Hole Peg  9-Hole Peg Left: 15.35  9-Hole Peg Right: 24.97  Quick DASH  Open a tight or new jar.: Moderate Difficulty  Do heavy household chores (e.g., wash walls, wash floors): Severe Difficulty  Carry a shopping bag or briefcase: Moderate Difficulty  Wash your back: Unable  Use a knife to cut food: Mild Difficulty  Recreational activities in which you take some force or impact through your arm, should or hand (e.g. golf, hammering, tennis, etc.): Unable  During the past week, to what extent has your arm, shoulder, or hand problem interfered with your normal social activites with family, friends, neighbors or groups?: Slightly  During the past week, were  you limited in your work or other regular daily activities as a result of your arm, shoulder or hand problem?: Moderately Limited  Arm, Shoulder, or hand pain: Moderate (Left shoulder, currently seeing P.T. for this)  Tingling (pins and needles) in your arm, shoulder, or hand: Mild  During the past week, how much difficulty have you had sleeping because of the pain in your arm, shoulder or hand?: Mild Difficulty  Number of Questions Answered: 11  Quick DASH Score: 52.27         Time Calculation:   OT Start Time: 0915  OT Stop Time: 0945  OT Time Calculation (min): 30 min     Therapy Charges for Today       Code Description Service Date Service Provider Modifiers Qty    [id number]  OT RE-EVAL 2 9/20/2023 Diana Richmond OTR GO 1                       JESSY Medina  9/20/2023

## 2023-09-20 NOTE — THERAPY PROGRESS REPORT/RE-CERT
Outpatient Physical Therapy Neuro Progress Note   Robyn Karimi     Patient Name: Delphine King  : 1985  MRN: [id number]  Today's Date: 2023      Visit Date: 2023    Visit Dx:    ICD-10-CM ICD-9-CM   1. MMN (multifocal motor neuropathy) (HCC): Receieved IVIG  G61.82 357.89       Patient Active Problem List   Diagnosis    Rectovaginal fistula: s/p repair in 2016    History of pulmonary embolism: lifelong anticoagulation    MMN (multifocal motor neuropathy)    Morbid (severe) obesity due to excess calories (*specify comorbidity)            PT Neuro       Row Name 23 1000             Subjective    Subjective Comments Pt reports her next infusion is scheduled for .  -JV         Home Living    Home Equipment Cane;Grab bars;Other (Comment)  R AFO  -JV         Cognition    Overall Cognitive Status WFL  -JV      Orientation Level Oriented X4  -JV         Sensation    Sensation WNL? WNL  -JV         Posture/Observations    Alignment Options Lumbar lordosis  -JV      Lumbar lordosis Moderate;Decreased  -JV      Observations Muscle atrophy  -JV      Posture/Observations Comments PPT in sitting and standing  -JV         General ROM    GENERAL ROM COMMENTS Oj LE ROM is WFL's  -JV         MMT Right Lower Ext    Rt Hip Flexion MMT, Gross Movement (3+/5) fair plus  -JV      Rt Hip Extension MMT, Gross Movement (3+/5) fair plus  -JV      Rt Hip ABduction MMT, Gross Movement (3+/5) fair plus  -JV      Rt Hip ADduction MMT, Gross Movement (4-/5) good minus  -JV      Rt Knee Extension MMT, Gross Movement (4-/5) good minus  -JV      Rt Ankle Dorsiflexion MMT, Gross Movement (3+/5) fair plus  -JV         MMT Left Lower Ext    Lt Hip Flexion MMT, Gross Movement (3+/5) fair plus  -JV      Lt Hip Extension MMT, Gross Movement (3+/5) fair plus  -JV      Lt Hip ABduction MMT, Gross Movement (3+/5) fair plus  -JV      Lt Hip ADduction MMT, Gross Movement (4-/5) good minus  -JV      Lt Knee Extension MMT,  Gross Movement (4-/5) good minus  -JV      Lt Ankle Dorsiflexion MMT, Gross Movement (3+/5) fair plus  -JV         Transfers    Sit-Stand [place] (Transfers) modified independence  -JV      Stand-Sit [place] (Transfers) independent  -JV         Gait/Stairs (Locomotion)    [place] Level (Gait) modified independence  -JV      Pattern (Gait) step-through  -JV      Deviations/Abnormal Patterns (Gait) base of support, wide;ivania decreased;gait speed decreased;stride length decreased;right sided deviations  -JV      Bilateral Gait Deviations decreased arm swing  -JV      Right Sided Gait Deviations foot drop/toe drag;heel strike decreased  -JV      [place] Level (Stairs) modified independence  -JV      Number of Steps (Stairs) 2  -JV      Ascending Technique (Stairs) step-over-step  -JV      Descending Technique (Stairs) step-over-step  -JV                User Key  (r) = Recorded By, (t) = Taken By, (c) = Cosigned By      Initials Name Provider Type    Cleopatra Glynn PT Physical Therapist                             PT Assessment/Plan       Row Name 09/20/23 1427          PT Assessment    Functional Limitations Decreased safety during functional activities;Impaired gait;Limitation in home management;Limitations in community activities;Performance in leisure activities;Performance in self-care ADL  -JV     Impairments Balance;Coordination;Endurance;Gait;Impaired muscle endurance;Motor function;Muscle strength  -JV     Assessment Comments The pt presents for tx and re-assessment this date, next infusion is due 9/25. The pt was assessed using the LEFS, 5XSTS, TUG, MILLER, and DGI. The score on the LEFS increased from 26 to 30 points, 5XSTS decreased from 29.9 to 22.3 sec, TUG decreased from 11.8 to 9.9 sec, MILLER was unchanged 56/56, and DGI score decreased from 15 to 14/24 points. Overall, the pt's gait is slower and requires more effort, decreased strength is noted Oj LE's and decreased arm swing on  the R was observed this week.  -JV        PT Plan    PT Plan Comments Assess monthly as needed, progress HEP as vishal.  -JV               User Key  (r) = Recorded By, (t) = Taken By, (c) = Cosigned By      Initials Name Provider Type    Cleopatra Glynn, PT Physical Therapist                        OP Exercises       Row Name 09/20/23 1000             Precautions    Existing Precautions/Restrictions fall  -JV         Subjective    Subjective Comments Pt reports her next infusion is scheduled for 9/25-27.  -JV         Exercise 1    Exercise Name 1 Pt was assessed with 5XSTS, TUG, MILLER, LEFS and DGI.  -JV                User Key  (r) = Recorded By, (t) = Taken By, (c) = Cosigned By      Initials Name Provider Type    Cleopatra Glynn, PT Physical Therapist                                 PT OP Goals       Row Name 09/20/23 1000          PT Short Term Goals    STG Date to Achieve 09/08/23  -JV     STG 1 Pt will be I with HEP  -JV     STG 1 Progress Ongoing  -JV     STG 2 Admin DGI at next visit  -JV     STG 2 Progress Met  -JV        Long Term Goals    LTG Date to Achieve 10/20/23  -JV     LTG 1 Pt will amb up/down steps with reciprocal pattern and 1 HR or cane.  -JV     LTG 1 Progress Progressing  -JV     LTG 2 Pt will improve 5XSST time to < 16.5 sec.  -JV     LTG 3 Pt will increase Oj LE strength to 4/5  -JV        Time Calculation    PT Goal Re-Cert Due Date 10/20/23  -JV               User Key  (r) = Recorded By, (t) = Taken By, (c) = Cosigned By      Initials Name Provider Type    Cleopatra Glynn, PT Physical Therapist                    Therapy Education  Education Details: Cont HEP, increase frequency of LE ther ex after IVIG infusion  Given: HEP  Program: Reinforced  How Provided: Verbal  Provided to: Patient  Level of Understanding: Teach back education performed, Verbalized    Outcome Measure Options: 5x Sit to Stand, Miller Balance, Timed Up and Go (TUG), Lower Extremity Functional Scale (LEFS), Dynamic  Gait Index  5 Times Sit to Stand  5 Times Sit to Stand (seconds): 22.28 seconds  5 Times Sit to Stand Comments: Previous score 29.9  Neal Balance Scale  Sitting to Standing: able to stand without using hands and stabilize independently  Standing Unsupported: able to stand safely for 2 minutes  Sitting with Back Unsupported but Feet Supported on Floor or on Stool: able to sit safely and securely for 2 minutes  Standing to Sitting: sits safely with minimal use of hands  Transfers: able to transfer safely with minor use of hands  Standing Unsupported with Eyes Closed: able to stand 10 seconds safely  Standing Unsupported with Feet Together: able to place feet together independently and stand 1 minute safely  Reaching Forward with Outstretched Arm While Standing: can reach forward confidently 25 cm (10 inches)   Object From the Floor From a Standing Position: able to  object safely and easily  Turning to Look Behind Over Left and Right Shoulders While Standing: looks behind from both sides and weight shifts well  Turn 360 Degrees: able to turn 360 degrees safely in 4 seconds or less  Place Alternate Foot on Step or Stool While Standing Unsupported: able to stand independently and safely and complete 8 steps in 20 seconds  Standing Unsupported with One Foot in Front: able to place foot tandem independently and hold 30 seconds  Standing on One Leg: able to lift leg independently and hold > 10 seconds  Neal Total Score: 56  Dynamic Gait Index (DGI)  Gait Level Surface: Moderate impairment: walks 20’, slow speed, abnormal gait patters, evidence for imbalance  Change in Gait Speed: Moderate impairment: Makes only minor adjustments to walking speed, or accomplishes a change in speed with significant gait deviations, or changes speed but loses balance but is able to recover and continue walking.  Gait with Horizontal Head Turns: Mild impairment: Performs head turns smoothly with slight change in gait velocity,  i.e. minor disruption to smooth gait path or uses walking aid.  Gait with Vertical Head Turns: Mild impairment: Performs head turns smoothly with slight change in gait velocity, i.e. minor disruption to smooth gait path or uses walking aid.  Gait and Pivot Turn: Mild impairment: pivot turns safely in >3 seconds and stops with no loss of balance.  Step Over Obstacle: Mild impairment: Is able to step over shoe box, but must slow down and adjust steps to clear box safely.  Step Around Obstacles: Mild impairment: Is able to step around both cones, but must slow down and adjust steps to clear cones.  Steps: Mild impairment: Alternating feet, must use rail.  Dynamic Gait Index Score: 14  Dynamic Gait Index Comments: Previous score 15  Lower Extremity Functional Index  Any of your usual work, housework or school activities: Moderate difficulty  Your usual hobbies, recreational or sporting activities: Moderate difficulty  Getting into or out of the bath: Moderate difficulty  Walking between rooms: A little bit of difficulty  Putting on your shoes or socks: A little bit of difficulty  Squatting: Extreme difficulty or unable to perform activity  Lifting an object, like a bag of groceries from the floor: Moderate difficulty  Performing light activities around your home: A little bit of difficulty  Performing heavy activities around your home: Quite a bit of difficulty  Getting into or out of a car: Moderate difficulty  Walking 2 blocks: Quite a bit of difficulty  Walking a mile: Extreme difficulty or unable to perform activity  Going up or down 10 stairs (about 1 flight of stairs): Moderate difficulty  Standing for 1 hour: Moderate difficulty  Sitting for 1 hour: Moderate difficulty  Running on even ground: Extreme difficulty or unable to perform activity  Running on uneven ground: Extreme difficulty or unable to perform activity  Making sharp turns while running fast: Extreme difficulty or unable to perform activity  Hopping:  Extreme difficulty or unable to perform activity  Rolling over in bed: A little bit of difficulty  Total: 30  Timed Up and Go (TUG)  TUG Test 1: 10.12 seconds  TUG Test 2: 9.6 seconds  Timed Up and Go Comments: Previous scores 12.19 and 11.4 sec      Time Calculation:   Start Time: 1000  Stop Time: 1045  Time Calculation (min): 45 min   Therapy Charges for Today       Code Description Service Date Service Provider Modifiers Qty    17774203375  PT THERAPEUTIC ACT EA 15 MIN 9/20/2023 Cleopatra Hoffmann, PT GP 3            PT G-Codes  Outcome Measure Options: 5x Sit to Stand, Neal Balance, Timed Up and Go (TUG), Lower Extremity Functional Scale (LEFS), Dynamic Gait Index  Neal Total Score: 56  Total: 30  TUG Test 1: 10.12 seconds  TUG Test 2: 9.6 seconds         Cleopatra Hoffmann, PT  9/20/2023

## 2023-10-06 ENCOUNTER — HOSPITAL ENCOUNTER (OUTPATIENT)
Dept: GENERAL RADIOLOGY | Facility: HOSPITAL | Age: 38
Discharge: HOME | End: 2023-10-06
Admitting: NURSE PRACTITIONER
Payer: COMMERCIAL

## 2023-10-06 ENCOUNTER — TELEPHONE (OUTPATIENT)
Dept: URGENT CARE | Facility: CLINIC | Age: 38
End: 2023-10-06
Payer: COMMERCIAL
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
## 2023-10-06 DIAGNOSIS — R07.81 RIB PAIN ON LEFT SIDE: ICD-10-CM

## 2023-10-06 PROCEDURE — 71101 X-RAY EXAM UNILAT RIBS/CHEST: CPT

## 2023-10-18 ENCOUNTER — HOSPITAL ENCOUNTER (OUTPATIENT)
Dept: OCCUPATIONAL THERAPY | Facility: HOSPITAL | Age: 38
Setting detail: THERAPIES SERIES
Discharge: HOME | End: 2023-10-18
Payer: COMMERCIAL

## 2023-10-18 ENCOUNTER — HOSPITAL ENCOUNTER (OUTPATIENT)
Dept: PHYSICAL THERAPY | Facility: HOSPITAL | Age: 38
Setting detail: THERAPIES SERIES
Discharge: HOME | End: 2023-10-18
Payer: COMMERCIAL

## 2023-10-18 DIAGNOSIS — G61.82 MMN (MULTIFOCAL MOTOR NEUROPATHY): Primary | ICD-10-CM

## 2023-10-18 DIAGNOSIS — G61.82 MULTIFOCAL MOTOR NEUROPATHY: Primary | ICD-10-CM

## 2023-10-18 DIAGNOSIS — G61.82 MMN (MULTIFOCAL MOTOR NEUROPATHY): ICD-10-CM

## 2023-10-18 PROCEDURE — 97530 THERAPEUTIC ACTIVITIES: CPT

## 2023-10-18 PROCEDURE — 97168 OT RE-EVAL EST PLAN CARE: CPT

## 2023-10-18 NOTE — THERAPY TREATMENT NOTE
"  Outpatient Physical Therapy Neuro Treatment Note   [place]     Patient Name: Delphine King  : 1985  MRN: [id number]  Today's Date: 10/18/2023      Visit Date: 10/18/2023    Visit Dx:    ICD-10-CM ICD-9-CM   1. MMN (multifocal motor neuropathy) ([facility]): Receieved IVIG  G61.82 357.89       Patient Active Problem List   Diagnosis    Rectovaginal fistula: s/p repair in 2016    History of pulmonary embolism: lifelong anticoagulation    MMN (multifocal motor neuropathy)    Morbid (severe) obesity due to excess calories (*specify comorbidity)                        PT Assessment/Plan       Row Name 10/18/23 1354          PT Assessment    Functional Limitations Decreased safety during functional activities;Impaired gait;Limitation in home management;Limitations in community activities;Performance in leisure activities;Performance in self-care ADL  -JV     Impairments Balance;Coordination;Endurance;Gait;Impaired muscle endurance;Motor function;Muscle strength  -JV     Assessment Comments The pt presents for tx and re-assessment this date. Pt reports 1 fall since last visit that occured while going up stairs leading with the L LE. Pt states leg \"gave out\". Pt is also recovering from a rib injury sustained in an accident. The pt was re-assessed with the LEFS, 5XSTS, TUG, FGA, and DGI. Score on the LEFS decreased from 30 to 27 mostly due to rib injury per report from patient. The 5XSTS improved from 22.28 to 21.92 sec, TUG avg increased from 9.86 to 12.53 sec, DGI score was the same at 14/24. A modified version of the DGI called the Functional Gait Assessment was admin for the first time and score was 15/30. Pt reports increased difficulty leading up stairs with the L LE, and hyperext is noted when pt has full weight on L LE. Increased laxity noted in L tibia post drawer when compared to R knee. Also noted, pt was amb this date with single pt cane and no AFO. When last tested, the pt was wearing AFO and not using " cane. Pt was encouraged to obtain an order for PT to eval and tx laxity of L knee.  -JV     Rehab Potential Good  -JV     Patient/caregiver participated in establishment of treatment plan and goals Yes  -JV     Patient would benefit from skilled therapy intervention Yes  -JV        PT Plan    PT Plan Comments Cont plan, assess monthly as needed.  -JV               User Key  (r) = Recorded By, (t) = Taken By, (c) = Cosigned By      Initials Name Provider Type    Cleopatra Glynn, PT Physical Therapist                        OP Exercises       Row Name 10/18/23 1000             Precautions    Existing Precautions/Restrictions fall  -JV         Subjective    Subjective Comments Pt reports her next infusion is 10/23-25. Pt experienced 1 fall while going up stairs leading with L LE.  -JV         Exercise 1    Exercise Name 1 Pt was assessed with 5XSTS, TUG, LEFS, FGA, and DGI.  -JV                User Key  (r) = Recorded By, (t) = Taken By, (c) = Cosigned By      Initials Name Provider Type    Cleopatra Glynn, PT Physical Therapist                                    Therapy Education  Education Details: Cont HEP, get order for PT eval and tx L knee instability.  Given: HEP  Program: Reinforced  How Provided: Verbal  Provided to: Patient  Level of Understanding: Teach back education performed, Verbalized    Outcome Measure Options: 5x Sit to Stand, Lower Extremity Functional Scale (LEFS), Timed Up and Go (TUG), FGA (Functional Gait Assessment), Dynamic Gait Index  5 Times Sit to Stand  5 Times Sit to Stand (seconds): 21.19 seconds  5 Times Sit to Stand Comments: Previous score 22.28  Dynamic Gait Index (DGI)  Gait Level Surface: Moderate impairment: walks 20’, slow speed, abnormal gait patters, evidence for imbalance  Change in Gait Speed: Moderate impairment: Makes only minor adjustments to walking speed, or accomplishes a change in speed with significant gait deviations, or changes speed but loses balance but is able  to recover and continue walking.  Gait with Horizontal Head Turns: Mild impairment: Performs head turns smoothly with slight change in gait velocity, i.e. minor disruption to smooth gait path or uses walking aid.  Gait with Vertical Head Turns: Mild impairment: Performs head turns smoothly with slight change in gait velocity, i.e. minor disruption to smooth gait path or uses walking aid.  Gait and Pivot Turn: Mild impairment: pivot turns safely in >3 seconds and stops with no loss of balance.  Step Over Obstacle: Mild impairment: Is able to step over shoe box, but must slow down and adjust steps to clear box safely.  Step Around Obstacles: Mild impairment: Is able to step around both cones, but must slow down and adjust steps to clear cones.  Steps: Mild impairment: Alternating feet, must use rail.  Dynamic Gait Index Score: 14  Dynamic Gait Index Comments: Previous score 14  Functional Gait Assessment (FGA)  Gait Level Surface: Moderate Impairment  Change in Gait Speed: Moderate Impairment  Gait with Horizontal Head Turns: Mild Impairment  Gait with Vertical Head Turns: Mild Impairment  Gait and Pivot Turn: Mild Impairment  Step Over Obstacle: Mild Impairment  Gait with Narrow Base of Support: Moderate Impairment  Gait with Eyes Closed: Moderate Impairment  Ambulating Backwards: Moderate Impairment  Steps: Mild Impairment  FGA Total Score: 15  FGA Comments: first adm  Lower Extremity Functional Index  Any of your usual work, housework or school activities: Moderate difficulty  Your usual hobbies, recreational or sporting activities: Moderate difficulty  Getting into or out of the bath: Moderate difficulty  Walking between rooms: Moderate difficulty  Putting on your shoes or socks: Moderate difficulty  Squatting: Extreme difficulty or unable to perform activity  Lifting an object, like a bag of groceries from the floor: Moderate difficulty  Performing light activities around your home: Moderate  difficulty  Performing heavy activities around your home: Extreme difficulty or unable to perform activity  Getting into or out of a car: A little bit of difficulty  Walking 2 blocks: Quite a bit of difficulty  Walking a mile: Extreme difficulty or unable to perform activity  Going up or down 10 stairs (about 1 flight of stairs): Quite a bit of difficulty  Standing for 1 hour: A little bit of difficulty  Sitting for 1 hour: A little bit of difficulty  Running on even ground: Extreme difficulty or unable to perform activity  Running on uneven ground: Extreme difficulty or unable to perform activity  Making sharp turns while running fast: Extreme difficulty or unable to perform activity  Hopping: Extreme difficulty or unable to perform activity  Rolling over in bed: Moderate difficulty  Total: 27  Timed Up and Go (TUG)  TUG Test 1: 12.86 seconds  TUG Test 2: 12.19 seconds  Timed Up and Go Comments: Previous scores 10.12 and 9.6 sec, pt amb with st cane this date and no AFO.      Time Calculation:   Start Time: 1015  Stop Time: 1100  Time Calculation (min): 45 min   Therapy Charges for Today       Code Description Service Date Service Provider Modifiers Qty    [id number]  PT THERAPEUTIC ACT EA 15 MIN 10/18/2023 Cleopatra Hoffmann, PT GP 3            PT G-Codes  Outcome Measure Options: 5x Sit to Stand, Lower Extremity Functional Scale (LEFS), Timed Up and Go (TUG), FGA (Functional Gait Assessment), Dynamic Gait Index  FGA Total Score: 15  Total: 27  TUG Test 1: 12.86 seconds  TUG Test 2: 12.19 seconds         Cleopatra Hoffmann, SARAH  10/18/2023

## 2023-10-18 NOTE — THERAPY RE-EVALUATION
Outpatient Occupational Therapy Neuro Re-Evaluation   [facility]     Patient Name: Delphine King  : 1985  MRN: [id number]  Today's Date: 10/18/2023      Visit Date: 10/18/2023    Patient Active Problem List   Diagnosis    Rectovaginal fistula: s/p repair in     History of pulmonary embolism: lifelong anticoagulation    MMN (multifocal motor neuropathy)    Morbid (severe) obesity due to excess calories (*specify comorbidity)        Past Medical History:   Diagnosis Date    Anxiety     Asthma     Cholelithiasis     Colon polyp     Deep vein thrombosis     Depression     Fissure, anal 2017    Hyperlipidemia     Kidney stones     MMN (multifocal motor neuropathy)     Neuropathy     PE (pulmonary thromboembolism) 2017    Stroke         Past Surgical History:   Procedure Laterality Date    ANAL FISTULA REPAIR      multiple repairs     SECTION      CHOLECYSTECTOMY WITH INTRAOPERATIVE CHOLANGIOGRAM N/A 2023    Procedure: CHOLECYSTECTOMY LAPAROSCOPIC INTRAOPERATIVE CHOLANGIOGRAM;  Surgeon: Goldie Mandujano DO;  Location: [facility];  Service: General;  Laterality: N/A;    ENDOSCOPY N/A 2023    Procedure: ESOPHAGOGASTRODUODENOSCOPY WITH COLD BX'S;  Surgeon: Mitch Barclay MD;  Location: [facility] ENDOSCOPY;  Service: Gastroenterology;  Laterality: N/A;  pre: abdominal pain  post: gastritis    ERCP N/A 2023    Procedure: ENDOSCOPIC RETROGRADE CHOLANGIOPANCREATOGRAPHY WITH SPHINCTEROTOMY AND BALOON SWEEP;  Surgeon: Mitch Barclay MD;  Location: [facility] ENDOSCOPY;  Service: Gastroenterology;  Laterality: N/A;  pre: biliary obstruction, choledocholithiasis  post: choledocholithiasis    HYSTERECTOMY  2017    PORTACATH PLACEMENT Right     power port    TONSILLECTOMY  2003    WISDOM TOOTH EXTRACTION  2003         Visit Dx:      ICD-10-CM ICD-9-CM   1. Multifocal motor neuropathy  G61.82 357.89   2. MMN (multifocal motor neuropathy) ([facility]): Receieved IVIG  G61.82 357.89            OT Neuro        Row Name 10/18/23 1200             Subjective Comments    Subjective Comments Patient reports she continues to require extended time to complete ADL/IADL tasks. Reports she's been going through vocational rehab and is waiting to see what job oppurtunities she's eligible for. Patient reports she has had difficulty opening things occasionally like soda cans but has found ways to be successful.  -EN         Subjective Pain    Subjective Pain Comment Patient reports she has IVIG infusions next week. Patient reports she was discontinued from PT for her left shoulder and reports it's feeling better. Reports no pain today.  -EN         Sensation    Additional Comments Patient repots occasional tingling continues to occur.  -EN         Coordination    9-Hole Peg Left 15.95  -EN      9-Hole Peg Right 21.06  -EN         General ROM    GENERAL ROM COMMENTS Bilateral UE AROM WFL  -EN         MMT Right Upper Ext    Rt Shoulder Flexion MMT, Gross Movement (4/5) good  -EN      Rt Shoulder Extension MMT, Gross Movement (4/5) good  -EN      Rt Shoulder ABduction MMT, Gross Movement (4/5) good  -EN      Rt Shoulder ADduction MMT, Gross Movement (4/5) good  -EN      Rt Elbow Flexion MMT, Gross Movement: (4+/5) good plus  -EN      Rt Elbow Extension MMT, Gross Movement: (4+/5) good plus  -EN      Rt Forearm Supination MMT, Gross Movement (4+/5) good plus  -EN      Rt Forearm Pronation MMT, Gross Movement (4+/5) good plus  -EN      Rt Wrist Flexion MMT, Gross Movement (3+/5) fair plus  -EN      Rt Wrist Extension MMT, Gross Movement (4/5) good  -EN         MMT Left Upper Ext    Lt Shoulder Flexion MMT, Gross Movement (4+/5) good plus  -EN      Lt Shoulder Extension MMT, Gross Movement (5/5) normal  -EN      Lt Shoulder ABduction MMT, Gross Movement (4+/5) good plus  -EN      Lt Shoulder ADduction MMT, Gross Movement (5/5) normal  -EN      Lt Elbow Flexion MMT, Gross Movement (5/5) normal  -EN      Lt Elbow Extension MMT, Gross  Movement (5/5) normal  -EN      Lt Forearm Supination MMT, Gross Movement (5/5) normal  -EN      Lt Forearm Pronation MMT, Gross Movement (5/5) normal  -EN      Lt Wrist Flexion MMT, Gross Movement (5/5) normal  -EN      Lt Wrist Extension MMT, Gross Movement (5/5) normal  -EN                User Key  (r) = Recorded By, (t) = Taken By, (c) = Cosigned By      Initials Name Provider Type    EN Diana Richmond OTR Occupational Therapist                    Hand Therapy (last 24 hours)       Hand Eval       Row Name 10/18/23 0915             Left Flexion AROM    II- MP AROM --  L hand AROM WNL  -EN         Right Extension AROM    II- PIP AROM -8  -EN      III- MP AROM -65  -EN      IV- MP AROM -45  -EN      V- MP AROM -40  -EN         Right Flexion AROM    II- MP AROM 70  -EN      II- PIP AROM 96  -EN      II- DIP AROM 50  -EN      II- [place] Right Flexion AROM 216  -EN      III- MP AROM 80  -EN      III- PIP AROM 80  -EN      III- DIP AROM 60  -EN      III- [place] Right Flexion AROM 220  -EN      IV- MP AROM 74  -EN      IV- PIP AROM 91  -EN      IV- DIP AROM 50  -EN      IV- [place] Right Flexion AROM 215  -EN      V- MP AROM 80  -EN      V- PIP AROM 97  -EN      V- DIP AROM 65  -EN      V- [place] Right Flexion AROM 242  -EN         Right Thumb AROM    MP Flexion - AROM 50  -EN      IP Flexion - AROM 65  -EN          Strength Right    # Reps 3  -EN      Right Rung 2  -EN      Right  Test 1 16  -EN      Right  Test 2 22  -EN      Right  Test 3 25  -EN       Strength Average Right 21  -EN          Strength Left    # Reps 3  -EN      Left Rung 2  -EN      Left  Test 1 59  -EN      Left  Test 2 46  -EN      Left  Test 3 55  -EN       Strength Average Left 53.33  -EN         Pinch Strength    Affected Side Right  -EN         Right Hand Strength - Pinch (lbs)    Lateral 6.67 lbs  8, 6, 6  -EN      Tip (3 point) 5.33 lbs  8, 6, 6  -EN         Left Hand Strength - Pinch (lbs)    Lateral 13.67  lbs  13, 14, 14  -EN      Tip (3 point) 15 lbs  15,15,15  -EN                User Key  (r) = Recorded By, (t) = Taken By, (c) = Cosigned By      Initials Name Provider Type    Diana Grimes, OTR Occupational Therapist                        Therapy Education  Education Details: Continue with HEP  Given: HEP  Program: Reinforced  How Provided: Verbal  Provided to: Patient  Level of Understanding: Teach back education performed, Verbalized                OT Assessment/Plan       Row Name 10/18/23 6361          OT Assessment    Assessment Comments Patient reports she has IVIG infusions next week. Patient reports she continues to use compensatory strategies and adaptive equipment for ADL/IADL routines. Patient's current and past measurements as follows:                                       Right : 22 - 2#,  - 16#,  - 18#, 23 - 21.33#, 3/21 -  #, 23 - 26.33#,  23 - 13.33#,  23  - 26#, 10/18 - #     Left : 22 - #  - 32#,  - 52.3#, 23- 56#, 3/21 - 41.66#, 23 - 55 #, 23 - 35.33#, 23 - 43#, 10-/18/23 - 53.3      Right lateral pinch: 22 - 3#,  - 7.3#,  - 7#, 23 6#, 3/21 - 7#, 23 - 7#, 23 - 6#, 23 - 6.3#, 10/18/23 - 6.7    Left lateral pinch: 22 - 7.3#,  - 9.6#,  - 11.3#, 23 - 13.3#, 3/21 - 11.66# ,  - 12.33#, 23 - 12#, 23 - 10.67#, 10/23/23 - 15#        Right 9 Hole Pe23 - 30.56 seconds,  - 18.78 seconds,  - 19.33 seconds, 23 - 18.94 seconds, 3/21 - 21.10 seconds, 23 - 20.77 seconds, 23 - 25.97 seconds, 23 - 24.97 seconds, 10/18/23 - 21.06 seconds.       Left 9 Hole Pe23 - 22.75 seconds,  - 19.42 seconds,  - 18.95 seconds, 23 - 18.16 seconds, 3/21 - 18.39 seconds, 23 - 16.78 seconds, 23 - 18.81 seconds, 23 - 24.97 seconds, 10/18/23 - 15.95 seconds.  -EN     OT Diagnosis MMN, decreased B UE  functional use, oscar R UE  -EN     OT Rehab Potential Good  -EN        OT Plan    Planned CPT's? OT EVAL LOW COMPLEXITY: 27508;OT THER ACT EA 15 MIN: [id number]  -EN     Planned Therapy Interventions (Optional Details) home exercise program;patient/family education;strengthening  -EN     OT Plan Comments Continue with plan of care. Returns to MD in February.  -EN               User Key  (r) = Recorded By, (t) = Taken By, (c) = Cosigned By      Initials Name Provider Type    Diana Grimes OTR Occupational Therapist                   OT Exercises       Row Name 10/18/23 1200             Exercise 1    Exercise Name 1 reviewed HEP and compensatory strategies for improved ADL/IADL success  -EN                User Key  (r) = Recorded By, (t) = Taken By, (c) = Cosigned By      Initials Name Provider Type    Diana Grimes OTR Occupational Therapist                      9 Hole Peg  9-Hole Peg Left: 15.95  9-Hole Peg Right: 21.06  Quick DASH  Open a tight or new jar.: Moderate Difficulty  Do heavy household chores (e.g., wash walls, wash floors): Unable  Carry a shopping bag or briefcase: Moderate Difficulty  Wash your back: Unable  Use a knife to cut food: Moderate Difficulty  Recreational activities in which you take some force or impact through your arm, should or hand (e.g. golf, hammering, tennis, etc.): Unable  During the past week, to what extent has your arm, shoulder, or hand problem interfered with your normal social activites with family, friends, neighbors or groups?: Moderately  During the past week, were you limited in your work or other regular daily activities as a result of your arm, shoulder or hand problem?: Moderately Limited  Arm, Shoulder, or hand pain: Mild  Tingling (pins and needles) in your arm, shoulder, or hand: Mild  During the past week, how much difficulty have you had sleeping because of the pain in your arm, shoulder or hand?: No difficulty  Number of Questions Answered:  11  Quick DASH Score: 54.55         Time Calculation:   OT Start Time: 0915  OT Stop Time: 0945  OT Time Calculation (min): 30 min     Therapy Charges for Today       Code Description Service Date Service Provider Modifiers Qty    [id number]  OT RE-EVAL 2 10/18/2023 Diana Richmond OTR GO 1                       JESSY Medina  10/18/2023

## 2023-11-15 ENCOUNTER — HOSPITAL ENCOUNTER (OUTPATIENT)
Dept: OCCUPATIONAL THERAPY | Facility: HOSPITAL | Age: 38
Setting detail: THERAPIES SERIES
Discharge: HOME | End: 2023-11-15
Payer: COMMERCIAL

## 2023-11-15 ENCOUNTER — HOSPITAL ENCOUNTER (OUTPATIENT)
Dept: PHYSICAL THERAPY | Facility: HOSPITAL | Age: 38
Setting detail: THERAPIES SERIES
Discharge: HOME | End: 2023-11-15
Payer: COMMERCIAL

## 2023-11-15 DIAGNOSIS — G61.82 MMN (MULTIFOCAL MOTOR NEUROPATHY): ICD-10-CM

## 2023-11-15 DIAGNOSIS — G61.82 MULTIFOCAL MOTOR NEUROPATHY: Primary | ICD-10-CM

## 2023-11-15 DIAGNOSIS — G61.82 MMN (MULTIFOCAL MOTOR NEUROPATHY): Primary | ICD-10-CM

## 2023-11-15 PROCEDURE — 97168 OT RE-EVAL EST PLAN CARE: CPT

## 2023-11-15 PROCEDURE — 97530 THERAPEUTIC ACTIVITIES: CPT

## 2023-11-15 NOTE — THERAPY EVALUATION
Outpatient Occupational Therapy Ortho Re-Evaluation   [place]     Patient Name: Delphine King  : 1985  MRN: [id number]  Today's Date: 11/15/2023      Visit Date: 11/15/2023    Patient Active Problem List   Diagnosis    Rectovaginal fistula: s/p repair in     History of pulmonary embolism: lifelong anticoagulation    MMN (multifocal motor neuropathy)    Morbid (severe) obesity due to excess calories (*specify comorbidity)        Past Medical History:   Diagnosis Date    Anxiety     Asthma     Cholelithiasis     Colon polyp     Deep vein thrombosis     Depression     Fissure, anal 2017    Hyperlipidemia     Kidney stones     MMN (multifocal motor neuropathy)     Neuropathy     PE (pulmonary thromboembolism) 2017    Stroke         Past Surgical History:   Procedure Laterality Date    ANAL FISTULA REPAIR      multiple repairs     SECTION      CHOLECYSTECTOMY WITH INTRAOPERATIVE CHOLANGIOGRAM N/A 2023    Procedure: CHOLECYSTECTOMY LAPAROSCOPIC INTRAOPERATIVE CHOLANGIOGRAM;  Surgeon: Goldie Mandujano DO;  Location: [facility];  Service: General;  Laterality: N/A;    ENDOSCOPY N/A 2023    Procedure: ESOPHAGOGASTRODUODENOSCOPY WITH COLD BX'S;  Surgeon: Mitch Barclay MD;  Location: [facility] ENDOSCOPY;  Service: Gastroenterology;  Laterality: N/A;  pre: abdominal pain  post: gastritis    ERCP N/A 2023    Procedure: ENDOSCOPIC RETROGRADE CHOLANGIOPANCREATOGRAPHY WITH SPHINCTEROTOMY AND BALOON SWEEP;  Surgeon: Mitch Barclay MD;  Location: [facility] ENDOSCOPY;  Service: Gastroenterology;  Laterality: N/A;  pre: biliary obstruction, choledocholithiasis  post: choledocholithiasis    HYSTERECTOMY  2017    PORTACATH PLACEMENT Right     power port    TONSILLECTOMY  2003    WISDOM TOOTH EXTRACTION  2003         Visit Dx:    ICD-10-CM ICD-9-CM   1. Multifocal motor neuropathy  G61.82 357.89   2. MMN (multifocal motor neuropathy) (HCC): Receieved IVIG  G61.82 357.89              OT Neuro        Row Name 11/15/23 0900             Subjective Comments    Subjective Comments Patient reports she has IVIG infusions next week. Reports she's feeling pretty good.  -EN         Subjective Pain    Subjective Pain Comment Patient reports the only pain she has in in her lower extremities. Patient reports pins and needles pain in the LEs expecially at night.  -EN         Coordination    9-Hole Peg Left 16.11  -EN      9-Hole Peg Right 22.39  -EN         General ROM    GENERAL ROM COMMENTS B shoulder/elbow AROM WFL, L wrist and hand AROM WFL. right wrist drop, see hand ROM measurements)  -EN         Right Upper Ext    Rt Shoulder Abduction AROM WFL  -EN      Rt Shoulder Flexion AROM WFL  -EN      Rt Elbow Supination AROM WFL  -EN      Rt Elbow Pronation AROM WFL  -EN      Rt Wrist Flexion AROM WFL  -EN      Rt Wrist Extension AROM history of r wrist drop over 10 years. Able to extend wrist with effort, difficulty extending digits 3, 4, and 5.  -EN         Left Upper Ext    Lt Shoulder Abduction AROM WFL  -EN      Lt Shoulder Flexion AROM WFL  -EN      Lt Elbow Extension/Flexion AROM WFL  -EN      Lt Elbow Supination AROM WFL  -EN      Lt Elbow Pronation AROM WFL  -EN      Lt Wrist Flexion AROM WFL  -EN      Lt Wrist Extension AROM WFL  -EN         MMT Right Upper Ext    Rt Shoulder Flexion MMT, Gross Movement (4/5) good  -EN      Rt Shoulder Extension MMT, Gross Movement (4+/5) good plus  -EN      Rt Shoulder ABduction MMT, Gross Movement (4/5) good  -EN      Rt Shoulder ADduction MMT, Gross Movement (4+/5) good plus  -EN      Rt Elbow Flexion MMT, Gross Movement: (4+/5) good plus  -EN      Rt Elbow Extension MMT, Gross Movement: (4+/5) good plus  -EN      Rt Forearm Supination MMT, Gross Movement (4+/5) good plus  -EN      Rt Forearm Pronation MMT, Gross Movement (4+/5) good plus  -EN      Rt Wrist Flexion MMT, Gross Movement (3+/5) fair plus  -EN      Rt Wrist Extension MMT, Gross Movement (4-/5) good minus  -EN          MMT Left Upper Ext    Lt Shoulder Flexion MMT, Gross Movement (4+/5) good plus  -EN      Lt Shoulder Extension MMT, Gross Movement (5/5) normal  -EN      Lt Shoulder ABduction MMT, Gross Movement (4+/5) good plus  -EN      Lt Shoulder ADduction MMT, Gross Movement (5/5) normal  -EN      Lt Elbow Flexion MMT, Gross Movement (5/5) normal  -EN      Lt Elbow Extension MMT, Gross Movement (5/5) normal  -EN      Lt Forearm Supination MMT, Gross Movement (5/5) normal  -EN      Lt Forearm Pronation MMT, Gross Movement (5/5) normal  -EN      Lt Wrist Flexion MMT, Gross Movement (5/5) normal  -EN      Lt Wrist Extension MMT, Gross Movement (5/5) normal  -EN                User Key  (r) = Recorded By, (t) = Taken By, (c) = Cosigned By      Initials Name Provider Type    Diana Grimes OTR Occupational Therapist                  Hand Therapy (last 24 hours)       Hand Eval       Row Name 11/15/23 0900             Left Flexion AROM    II- MP AROM 85  -EN      II- PIP AROM 103  -EN      II- DIP AROM 80  -EN      II- FREDERICK Left Flexion AROM 268  -EN      III- MP AROM 85  -EN      III- PIP AROM 105  -EN      III- DIP AROM 85  -EN      III- FREDERICK Left Flexion AROM 275  -EN      IV- MP AROM 81  -EN      IV- PIP AROM 110  -EN      IV- DIP AROM 80  -EN      IV- FREDERICK Left Flexion AROM 271  -EN      V- MP AROM 85  -EN      V- PIP AROM 98  -EN      V- DIP AROM 85  -EN      V- FREDERICK Left Flexion AROM 268  -EN         Right Extension AROM    II- MP AROM 0  -EN      II- PIP AROM 0  -EN      II- DIP AROM 0  -EN      II- FREDERICK Right Extension AROM 0  -EN      III- MP AROM -76  -EN      III- PIP AROM -26  -EN      III- DIP AROM 0  -EN      III- FREDERICK Right Extension AROM -102  -EN      IV- MP AROM -75  -EN      IV- PIP AROM -41  -EN      IV- DIP AROM 0  -EN      IV- FREDERICK Right Extension AROM -116  -EN      V- MP AROM -75  -EN      V- PIP AROM -24  -EN      V- DIP AROM 0  -EN      V- FREDERICK Right Extension AROM -99  -EN         Right Flexion  AROM    II- MP AROM 76  -EN      II- PIP AROM 105  -EN      II- DIP AROM 70  -EN      II- FREDERICK Right Flexion AROM 251  -EN      III- MP AROM 76  -EN      III- PIP AROM 101  -EN      III- DIP AROM 70  -EN      III- FREDERICK Right Flexion AROM 247  -EN      IV- MP AROM 80  -EN      IV- PIP AROM 101  -EN      IV- DIP AROM 65  -EN      IV- FREDERICK Right Flexion AROM 246  -EN      V- MP AROM 85  -EN      V- PIP AROM 98  -EN      V- DIP AROM 50  -EN      V- FREDERICK Right Flexion AROM 233  -EN         Right Thumb AROM    MP Flexion - AROM 51  -EN      IP Flexion - AROM 76  -EN         Left Thumb AROM    MP Flexion - AROM 58  -EN      IP Flexion - AROM 78  -EN          Strength Right    # Reps 3  -EN      Right Rung 2  -EN      Right  Test 1 14  -EN      Right  Test 2 14  -EN      Right  Test 3 21  -EN       Strength Average Right 16.33  -EN          Strength Left    # Reps 3  -EN      Left Rung 2  -EN      Left  Test 1 55  -EN      Left  Test 2 51  -EN      Left  Test 3 48  -EN       Strength Average Left 51.33  -EN         Pinch Strength    Number of Reps 3  -EN      Affected Side Right  -EN         Right Hand Strength - Pinch (lbs)    Lateral 6 lbs  6,6,6  -EN      Tip (3 point) 7.33 lbs  7,7,8  -EN         Left Hand Strength - Pinch (lbs)    Lateral 13.33 lbs  14,14,12  -EN      Tip (3 point) 16 lbs  17,16,15  -EN         Therapy Education    Education Details Continue with HEP. Patient issued right wrist cock up to wear for functional activities such as feeding self due to hand fatigue and difficulty grasping utensils.  -EN      Given HEP;Other (comment)  -EN      Program Reinforced  -EN      How Provided Verbal;Demonstration  -EN      Provided to Patient  -EN      Level of Understanding Teach back education performed;Verbalized;Demonstrated  -EN                User Key  (r) = Recorded By, (t) = Taken By, (c) = Cosigned By      Initials Name Provider Type    Diana Grimes OTR  Occupational Therapist                        Therapy Education  Education Details: Continue with HEP. Patient issued right wrist cock up to wear for functional activities such as feeding self due to hand fatigue and difficulty grasping utensils.  Given: HEP, Other (comment)  Program: Reinforced  How Provided: Verbal, Demonstration  Provided to: Patient  Level of Understanding: Teach back education performed, Verbalized, Demonstrated     OT Goals       Row Name 11/15/23 0915          OT Short Term Goals    STG Date to Achieve 12/13/23  -EN     STG 1 Patient will demonstrate improved right  strength by 3# for improved grasp of objects. (Currently 25# pounds)  -EN     STG 1 Progress Ongoing  -EN     STG 2 Patient will report modified independence with ADL/IADL routine (compensatory strategies, adaptive equipment, etc..)  -EN     STG 2 Progress Met  -EN        Long Term Goals    LTG Date to Achieve 01/16/24  -EN     LTG 1 Patient will demonstrate improved fine motor coordination evidenced by a 5 second improved 9 hole peg score (right hand)  -EN     LTG 1 Progress Met  -EN     LTG 2 Patient will demonstrate improved right hand  strength by 20 pounds for improved grasp of objects.(30#)  -EN     LTG 2 Progress Progressing;Ongoing  -EN     LTG 3 Patient will improve Quick Dash score 50 points.  -EN     LTG 3 Progress Ongoing;Progressing  -EN               User Key  (r) = Recorded By, (t) = Taken By, (c) = Cosigned By      Initials Name Provider Type    Diana Grimes OTR Occupational Therapist                     OT Assessment/Plan       Row Name 11/15/23 1003          OT Assessment    Functional Limitations Limitation in home management;Limitations in community activities;Performance in leisure activities;Performance in self-care ADL  -EN     Impairments Coordination;Dexterity;Muscle strength;Pain;Range of motion  -EN     Assessment Comments Patient reports she has IVIG infusions next week and states  she's feeling pretty good this week. Patient reports no pain in UEs however has been having pins and needle pain in LE's, especially at night. Patient reports her wrist drop continues to be an issue for activities which require grasp, such as feeding self. Patient issued right wrist cock up to use for functional activities such as feeding. Patient demonstrated decreased right digit extension in digits 3-5 compared to last month but no significant changes with flexion. Patient's current and past measurements as follows:Right : 22 - 2#,  - 16#,  - 18#, 23 - 21.33#, 3/21 - 20 #, 23 - 26.33#,  23 - 13.33#,  23  - 26#, 10/18 - 21#, 11/15/23 -16.33#.      Left : 22 - 22#  - 32#,  - 52.3#, 23- 56#, 3/21 - 41.66#, 23 - 55 #, 23 - 35.33#, 23 - 43#, 10-/18/23 - 53.3, 11/15/23 - 51.33#.       Right lateral pinch: 22 - 3#,  - 7.3#,  - 7#, 23 6#, 3/21 - 7#, 23 - 7#, 23 - 6#, 23 - 6.3#, 10/18/23 - 6.7, 11/15/23 - 6#.     Left lateral pinch: 22 - 7.3#,  - 9.6#,  - 11.3#, 23 - 13.3#, 3/21 - 11.66# ,  - 12.33#, 23 - 12#, 23 - 10.67#, 10/23/23 - 15#, 11/15/23 - 13.33#.          Right 9 Hole Pe23 - 30.56 seconds,  - 18.78 seconds,  - 19.33 seconds, 23 - 18.94 seconds, 3/21 - 21.10 seconds, 23 - 20.77 seconds, 23 - 25.97 seconds, 23 - 24.97 seconds, 10/18/23 - 21.06 seconds, 11/15/23 - 22.39 seconds.        Left 9 Hole Pe23 - 22.75 seconds,  - 19.42 seconds,  - 18.95 seconds, 23 - 18.16 seconds, 3/21 - 18.39 seconds, 23 - 16.78 seconds, 23 - 18.81 seconds, 23 - 24.97 seconds, 10/18/23 - 15.95 seconds, 11/15/23 - 16.11 seconds.  -EN     OT Diagnosis MMN, decreased B UE functional use, oscar. R UE  -EN     OT Rehab Potential Good  -EN        OT Plan    OT Plan Comments Continue with plan of care.  -EN               User Key   (r) = Recorded By, (t) = Taken By, (c) = Cosigned By      Initials Name Provider Type    Diana Grimes OTR Occupational Therapist                      OT Exercises       Row Name 11/15/23 0900             Exercise 1    Exercise Name 1 review of HEP. Educated on use of right wrist cock up during right functional activity  -EN                User Key  (r) = Recorded By, (t) = Taken By, (c) = Cosigned By      Initials Name Provider Type    Diana Grimes OTR Occupational Therapist                      9 Hole Peg  9-Hole Peg Left: 16.11  9-Hole Peg Right: 22.39  Quick DASH  Open a tight or new jar.: Moderate Difficulty  Do heavy household chores (e.g., wash walls, wash floors): Unable  Carry a shopping bag or briefcase: Moderate Difficulty  Wash your back: Unable  Use a knife to cut food: Moderate Difficulty  Recreational activities in which you take some force or impact through your arm, should or hand (e.g. golf, hammering, tennis, etc.): Unable  During the past week, to what extent has your arm, shoulder, or hand problem interfered with your normal social activites with family, friends, neighbors or groups?: Slightly  During the past week, were you limited in your work or other regular daily activities as a result of your arm, shoulder or hand problem?: Slightly Limited  Arm, Shoulder, or hand pain: Mild  Tingling (pins and needles) in your arm, shoulder, or hand: Mild  During the past week, how much difficulty have you had sleeping because of the pain in your arm, shoulder or hand?: No difficulty  Number of Questions Answered: 11  Quick DASH Score: 50         Time Calculation:    OT Start Time: 0917  OT Stop Time: 0945  OT Time Calculation (min): 28 min     Therapy Charges for Today       Code Description Service Date Service Provider Modifiers Qty    [id number]  OT RE-EVAL 2 11/15/2023 Diana Richmond OTR GO 1                    JESSY Medina  11/15/2023

## 2023-11-15 NOTE — THERAPY PROGRESS REPORT/RE-CERT
Outpatient Physical Therapy Neuro Progress Note   [place]     Patient Name: Delphine King  : 1985  MRN: [id number]  Today's Date: 11/15/2023      Visit Date: 11/15/2023    Visit Dx:    ICD-10-CM ICD-9-CM   1. MMN (multifocal motor neuropathy) ([facility]): Receieved IVIG  G61.82 357.89       Patient Active Problem List   Diagnosis    Rectovaginal fistula: s/p repair in 2016    History of pulmonary embolism: lifelong anticoagulation    MMN (multifocal motor neuropathy)    Morbid (severe) obesity due to excess calories (*specify comorbidity)                        PT Assessment/Plan       Row Name 11/15/23 1227          PT Assessment    Functional Limitations Decreased safety during functional activities;Impaired gait;Limitation in home management;Limitations in community activities;Performance in leisure activities;Performance in self-care ADL  -JV     Impairments Balance;Coordination;Endurance;Gait;Impaired muscle endurance;Motor function;Muscle strength  -JV     Assessment Comments The pt presents for tx and re-assessment, reports no new falls. Next infusion is scheduled for . The pt was assessed with the LEFS, 5XSTS, and TUG. Scores on the MILLER, and DGI have been unchanged x 3 assessments. Pt's score on the LEFS decreased from 27 to 26, 5XSTS improved from 21.92 to 19.5 sec, and TUG average decreased from 12.53 to 10.24 sec. Pt was tested this date using cane and no AFO.  -JV        PT Plan    PT Plan Comments Cont plan, assess monthly as needed.  -JV               User Key  (r) = Recorded By, (t) = Taken By, (c) = Cosigned By      Initials Name Provider Type    Cleopatra Glynn, PT Physical Therapist                        OP Exercises       Row Name 11/15/23 1000             Precautions    Existing Precautions/Restrictions fall  -JV         Subjective    Subjective Comments Pt reports her next infusion is .  -JV         Exercise 1    Exercise Name 1 Pt was assessed with 5XSTS, TUG, and  LEFS.  -JV                User Key  (r) = Recorded By, (t) = Taken By, (c) = Cosigned By      Initials Name Provider Type    Cleopatra Glynn, SARAH Physical Therapist                                 PT OP Goals       Row Name 11/15/23 1000          PT Short Term Goals    STG Date to Achieve 09/08/23  -JV     STG 1 Pt will be I with HEP  -JV     STG 1 Progress Ongoing  -JV     STG 2 Admin DGI at next visit  -JV     STG 2 Progress Met  -JV        Long Term Goals    LTG Date to Achieve 12/15/23  -JV     LTG 1 Pt will amb up/down steps with reciprocal pattern and 1 HR or cane.  -JV     LTG 1 Progress Progressing  -JV     LTG 2 Pt will improve 5XSST time to < 16.5 sec.  -JV     LTG 3 Pt will increase Oj LE strength to 4/5  -JV        Time Calculation    PT Goal Re-Cert Due Date 12/15/23  -J               User Key  (r) = Recorded By, (t) = Taken By, (c) = Cosigned By      Initials Name Provider Type    Cleopatra Glynn PT Physical Therapist                    Therapy Education  Education Details: Cont HEP, added supine 4 way hip exercise to plan.  Given: HEP  Program: Reinforced, New  How Provided: Verbal, Demonstration, Written  Provided to: Patient  Level of Understanding: Teach back education performed, Verbalized    Outcome Measure Options: 5x Sit to Stand, Lower Extremity Functional Scale (LEFS), Timed Up and Go (TUG)  5 Times Sit to Stand  5 Times Sit to Stand (seconds): 19.5 seconds  5 Times Sit to Stand Comments: Previous time 21.92  Lower Extremity Functional Index  Any of your usual work, housework or school activities: A little bit of difficulty  Your usual hobbies, recreational or sporting activities: Moderate difficulty  Getting into or out of the bath: A little bit of difficulty  Walking between rooms: A little bit of difficulty  Putting on your shoes or socks: Moderate difficulty  Squatting: Extreme difficulty or unable to perform activity  Lifting an object, like a bag of groceries from the floor:  Moderate difficulty  Performing light activities around your home: Moderate difficulty  Performing heavy activities around your home: Extreme difficulty or unable to perform activity  Getting into or out of a car: Moderate difficulty  Walking 2 blocks: Quite a bit of difficulty  Walking a mile: Extreme difficulty or unable to perform activity  Going up or down 10 stairs (about 1 flight of stairs): Moderate difficulty  Standing for 1 hour: Quite a bit of difficulty  Sitting for 1 hour: Quite a bit of difficulty  Running on even ground: Extreme difficulty or unable to perform activity  Running on uneven ground: Extreme difficulty or unable to perform activity  Making sharp turns while running fast: Extreme difficulty or unable to perform activity  Hopping: Extreme difficulty or unable to perform activity  Rolling over in bed: Moderate difficulty  Total: 26  Timed Up and Go (TUG)  TUG Test 1: 10.26 seconds  TUG Test 2: 10.21 seconds  Timed Up and Go Comments: Previous scores 12.86 and 12.19      Time Calculation:   Start Time: 1015  Stop Time: 1100  Time Calculation (min): 45 min   Therapy Charges for Today       Code Description Service Date Service Provider Modifiers Qty    81875480673  PT THERAPEUTIC ACT EA 15 MIN 11/15/2023 Cleopatra Hoffmann, PT GP 3            PT G-Codes  Outcome Measure Options: 5x Sit to Stand, Lower Extremity Functional Scale (LEFS), Timed Up and Go (TUG)  Total: 26  TUG Test 1: 10.26 seconds  TUG Test 2: 10.21 seconds         Cleopatra Hoffmann, PT  11/15/2023

## 2023-12-12 ENCOUNTER — HOSPITAL ENCOUNTER (OUTPATIENT)
Dept: PHYSICAL THERAPY | Facility: HOSPITAL | Age: 38
Setting detail: THERAPIES SERIES
Discharge: HOME | End: 2023-12-12
Payer: COMMERCIAL

## 2023-12-12 ENCOUNTER — HOSPITAL ENCOUNTER (OUTPATIENT)
Dept: OCCUPATIONAL THERAPY | Facility: HOSPITAL | Age: 38
Setting detail: THERAPIES SERIES
Discharge: HOME | End: 2023-12-12
Payer: COMMERCIAL

## 2023-12-12 DIAGNOSIS — G61.82 MMN (MULTIFOCAL MOTOR NEUROPATHY): ICD-10-CM

## 2023-12-12 DIAGNOSIS — G61.82 MULTIFOCAL MOTOR NEUROPATHY: Primary | ICD-10-CM

## 2023-12-12 DIAGNOSIS — G61.82 MMN (MULTIFOCAL MOTOR NEUROPATHY): Primary | ICD-10-CM

## 2023-12-12 PROCEDURE — 97530 THERAPEUTIC ACTIVITIES: CPT

## 2023-12-12 PROCEDURE — 97168 OT RE-EVAL EST PLAN CARE: CPT

## 2023-12-12 NOTE — THERAPY EVALUATION
Outpatient Occupational Therapy Neuro Re-Evaluation   Robyn Karimi     Patient Name: Delphine King  : 1985  MRN: [id number]  Today's Date: 2023      Visit Date: 2023    Patient Active Problem List   Diagnosis    Rectovaginal fistula: s/p repair in     History of pulmonary embolism: lifelong anticoagulation    MMN (multifocal motor neuropathy)    Morbid (severe) obesity due to excess calories (*specify comorbidity)        Past Medical History:   Diagnosis Date    Anxiety     Asthma     Cholelithiasis     Colon polyp     Deep vein thrombosis     Depression     Fissure, anal 2017    Hyperlipidemia     Kidney stones     MMN (multifocal motor neuropathy)     Neuropathy     PE (pulmonary thromboembolism) 2017    Stroke         Past Surgical History:   Procedure Laterality Date    ANAL FISTULA REPAIR      multiple repairs     SECTION      CHOLECYSTECTOMY WITH INTRAOPERATIVE CHOLANGIOGRAM N/A 2023    Procedure: CHOLECYSTECTOMY LAPAROSCOPIC INTRAOPERATIVE CHOLANGIOGRAM;  Surgeon: Goldie Mandujano DO;  Location: [facility];  Service: General;  Laterality: N/A;    ENDOSCOPY N/A 2023    Procedure: ESOPHAGOGASTRODUODENOSCOPY WITH COLD BX'S;  Surgeon: Mitch Barclay MD;  Location: [facility] ENDOSCOPY;  Service: Gastroenterology;  Laterality: N/A;  pre: abdominal pain  post: gastritis    ERCP N/A 2023    Procedure: ENDOSCOPIC RETROGRADE CHOLANGIOPANCREATOGRAPHY WITH SPHINCTEROTOMY AND BALOON SWEEP;  Surgeon: Mitch Barclay MD;  Location: [facility] ENDOSCOPY;  Service: Gastroenterology;  Laterality: N/A;  pre: biliary obstruction, choledocholithiasis  post: choledocholithiasis    HYSTERECTOMY  2017    PORTACATH PLACEMENT Right     power port    TONSILLECTOMY  2003    WISDOM TOOTH EXTRACTION  2003         Visit Dx:      ICD-10-CM ICD-9-CM   1. Multifocal motor neuropathy  G61.82 357.89   2. MMN (multifocal motor neuropathy) (HCC): Receieved IVIG  G61.82 357.89            OT Neuro   "     Row Name 12/12/23 1100             Subjective Comments    Subjective Comments Patient reports the wrist cock up brace has been helpful for feeding. Patient states somedays her wrist drop is worse than others and when it's a bad day she wears the brace.  -EN         Subjective Pain    Subjective Pain Comment Patient reports no pain. \"I feel pretty good today!\"  -EN         Coordination    9-Hole Peg Left 15.84  -EN      9-Hole Peg Right 17.85  -EN         General ROM    GENERAL ROM COMMENTS B shoulder/elbow AROM WFL. L wrist/hand WFL, right wrist drop and decreased R finger extension in digits 3, 4, and 5.  -EN         Right Upper Ext    Rt Shoulder Abduction AROM WFL  -EN      Rt Shoulder Flexion AROM WFL  -EN      Rt Elbow Supination AROM WFL  -EN      Rt Elbow Pronation AROM WFL  -EN      Rt Wrist Flexion AROM WFL  -EN      Rt Wrist Extension AROM 10 year history of wrist drop which is worse some days than others. Patient able to extend wrist with effort, limited finger extension in digits 3, 4, and 5  -EN         Left Upper Ext    Lt Shoulder Abduction AROM WFL  -EN      Lt Shoulder Flexion AROM WFL  -EN      Lt Elbow Extension/Flexion AROM WFL  -EN      Lt Elbow Supination AROM WFL  -EN      Lt Elbow Pronation AROM WFL  -EN      Lt Wrist Flexion AROM WFL  -EN      Lt Wrist Extension AROM WFL  -EN         MMT Right Upper Ext    Rt Shoulder Flexion MMT, Gross Movement (4/5) good  -EN      Rt Shoulder Extension MMT, Gross Movement (4+/5) good plus  -EN      Rt Shoulder ABduction MMT, Gross Movement (4/5) good  -EN      Rt Shoulder ADduction MMT, Gross Movement (4+/5) good plus  -EN      Rt Elbow Flexion MMT, Gross Movement: (4+/5) good plus  -EN      Rt Elbow Extension MMT, Gross Movement: (4+/5) good plus  -EN      Rt Forearm Supination MMT, Gross Movement (4+/5) good plus  -EN      Rt Forearm Pronation MMT, Gross Movement (4+/5) good plus  -EN      Rt Wrist Flexion MMT, Gross Movement (3/5) fair  -EN      Rt " Wrist Extension MMT, Gross Movement (4-/5) good minus  -EN         MMT Left Upper Ext    Lt Shoulder Flexion MMT, Gross Movement (4+/5) good plus  -EN      Lt Shoulder Extension MMT, Gross Movement (5/5) normal  -EN      Lt Shoulder ABduction MMT, Gross Movement (4+/5) good plus  -EN      Lt Shoulder ADduction MMT, Gross Movement (5/5) normal  -EN      Lt Elbow Flexion MMT, Gross Movement (5/5) normal  -EN      Lt Elbow Extension MMT, Gross Movement (5/5) normal  -EN      Lt Forearm Supination MMT, Gross Movement (5/5) normal  -EN      Lt Forearm Pronation MMT, Gross Movement (5/5) normal  -EN      Lt Wrist Flexion MMT, Gross Movement (5/5) normal  -EN      Lt Wrist Extension MMT, Gross Movement (5/5) normal  -EN                User Key  (r) = Recorded By, (t) = Taken By, (c) = Cosigned By      Initials Name Provider Type    Diana Grimes OTR Occupational Therapist                    Hand Therapy (last 24 hours)       Hand Eval       Row Name 12/12/23 1100             Left Flexion AROM    II- MP AROM --  Left hand AROM is WFL  -EN         Right Extension AROM    II- MP AROM 0  -EN      II- PIP AROM 0  -EN      II- DIP AROM 0  -EN      II- [place] Right Extension AROM 0  -EN      III- MP AROM -80  -EN      III- PIP AROM -24  -EN      III- DIP AROM 0  -EN      III- [place] Right Extension AROM -104  -EN      IV- MP AROM -68  -EN      IV- PIP AROM -35  -EN      IV- DIP AROM 0  -EN      IV- [place] Right Extension AROM -103  -EN      V- MP AROM -67  -EN      V- PIP AROM -10  -EN      V- DIP AROM 0  -EN      V- [place] Right Extension AROM -77  -EN         Right Flexion AROM    II- MP AROM 81  -EN      II- PIP AROM 102  -EN      II- DIP AROM 80  -EN      II- [place] Right Flexion AROM 263  -EN      III- MP AROM 83  -EN      III- PIP AROM 101  -EN      III- DIP AROM 60  -EN      III- [place] Right Flexion AROM 244  -EN      IV- MP AROM 80  -EN      IV- PIP AROM 111  -EN      IV- DIP AROM 60  -EN      IV- [place] Right Flexion AROM 251   -EN      V- MP AROM 85  -EN      V- PIP AROM 96  -EN      V- DIP AROM 45  -EN      V- FR[place]ICK Right Flexion AROM 226  -EN         Right Thumb AROM    MP Flexion - AROM 51  -EN      IP Flexion - AROM 83  -EN          Strength Right    # Reps 3  -EN      Right Rung 2  -EN      Right  Test 1 21  -EN      Right  Test 2 21  -EN      Right  Test 3 14  -EN       Strength Average Right 18.67  -EN          Strength Left    # Reps 3  -EN      Left Rung 2  -EN      Left  Test 1 50  -EN      Left  Test 2 40  -EN      Left  Test 3 45  -EN       Strength Average Left 45  -EN         Pinch Strength    Number of Reps 3  -EN      Affected Side Right  -EN         Right Hand Strength - Pinch (lbs)    Lateral 6 lbs  6,6,6  -EN      Tip (3 point) 6.3 lbs  8,5,6  -EN         Left Hand Strength - Pinch (lbs)    Lateral 13.33 lbs  12,14,14  -EN      Tip (3 point) 13.33 lbs  12,14,14  -EN         Therapy Education    Education Details Continue with home strengthening program. Continue with brace as needed for improved independence with ADLs/IADLs.  -EN      Given HEP;Symptoms/condition management  -EN      Program Reinforced  -EN      How Provided Verbal;Demonstration  -EN      Provided to Patient  -EN      Level of Understanding Teach back education performed;Verbalized;Demonstrated  -EN                User Key  (r) = Recorded By, (t) = Taken By, (c) = Cosigned By      Initials Name Provider Type    Diana Grimes OTR Occupational Therapist                        Therapy Education  Education Details: Continue with home strengthening program. Continue with brace as needed for improved independence with ADLs/IADLs.  Given: HEP, Symptoms/condition management  Program: Reinforced  How Provided: Verbal, Demonstration  Provided to: Patient  Level of Understanding: Teach back education performed, Verbalized, Demonstrated                OT Assessment/Plan       Row Name 12/12/23 6439          OT  Assessment    Functional Limitations Limitation in home management;Limitations in community activities;Performance in leisure activities;Performance in self-care ADL  -EN     Impairments Coordination;Dexterity;Muscle strength;Pain;Range of motion  -EN     Assessment Comments Patient reports she has IVIG infusions next week and states she's feeling pretty good today. Patient reports her wrist drop continues to be an issue for activities which require grasp, such as feeding self  however has been wearing wrist cock up brace and this has helped.  Patient demonstrated improved right  strength this month and demonstrated an improved 9 Hole Peg score, however L  decreased 6#. Patient's current and past measurements as follows:Right : 22 - 2#,  - 16#,  - 18#, 23 - 21.33#, 3/21 - 20 #, 23 - 26.33#,  23 - 13.33#,  23  - 26#, 10/18 - 21#, 11/15/23 -16.33#, 23 - 18.47#.      Left : 22 - 22#  - 32#,  - 52.3#, 23- 56#, 3/21 - 41.66#, 23 - 55 #, 23 - 35.33#, 23 - 43#, 10-/18/23 - 53.3, 11/15/23 - 51.33#, 23 - 45#.       Right lateral pinch: 22 - 3#,  - 7.3#,  - 7#, 23 6#, 3/21 - 7#, 23 - 7#, 23 - 6#, 23 - 6.3#, 10/18/23 - 6.7, 11/15/23 - 6#, 23 - 6#.     Left lateral pinch: 22 - 7.3#,  - 9.6#,  - 11.3#, 23 - 13.3#, 3/21 - 11.66# ,  - 12.33#, 23 - 12#, 23 - 10.67#, 10/23/23 - 15#, 11/15/23 - 13.33#, 23 - 13.33#.          Right 9 Hole Pe23 - 30.56 seconds,  - 18.78 seconds,  - 19.33 seconds, 23 - 18.94 seconds, 3/21 - 21.10 seconds, 23 - 20.77 seconds, 23 - 25.97 seconds, 23 - 24.97 seconds, 10/18/23 - 21.06 seconds, 11/15/23 - 22.39 seconds, 23 - 17.85 seconds.        Left 9 Hole Pe23 - 22.75 seconds,  - 19.42 seconds,  - 18.95 seconds, 23 - 18.16 seconds, 3/21 - 18.39 seconds, 23 - 16.78  seconds, 8/23/23 - 18.81 seconds, 9/20/23 - 24.97 seconds, 10/18/23 - 15.95 seconds, 11/15/23 - 16.11 seconds, 12/12/23 - 15.84 seconds.  -EN     OT Diagnosis MMN, decreased B UE functional use, oscar. R UE  -EN     OT Rehab Potential Good  -EN     Patient/caregiver participated in establishment of treatment plan and goals Yes  -EN        OT Plan    OT Frequency --  Reassessment every 4 weeks prior to IVIG infusions per MD orders  -EN     Predicted Duration of Therapy Intervention (OT) 6 months  -EN     Planned CPT's? OT EVAL LOW COMPLEXITY: 00436  -EN     Planned Therapy Interventions (Optional Details) home exercise program;patient/family education;strengthening  -EN     OT Plan Comments Continue with plan of care.  -EN               User Key  (r) = Recorded By, (t) = Taken By, (c) = Cosigned By      Initials Name Provider Type    Diana Grimes OTR Occupational Therapist                   OT Exercises       Row Name 12/12/23 1015             Exercise 1    Exercise Name 1 review of HEP and compensatory strategies for improved ADL/IADL independence  -EN                User Key  (r) = Recorded By, (t) = Taken By, (c) = Cosigned By      Initials Name Provider Type    Diana Grimes OTR Occupational Therapist                      9 Hole Peg  9-Hole Peg Left: 15.84  9-Hole Peg Right: 17.85  Quick DASH  Open a tight or new jar.: Moderate Difficulty  Do heavy household chores (e.g., wash walls, wash floors): Severe Difficulty  Carry a shopping bag or briefcase: Moderate Difficulty  Wash your back: Unable  Use a knife to cut food: Moderate Difficulty  Recreational activities in which you take some force or impact through your arm, should or hand (e.g. golf, hammering, tennis, etc.): Unable  During the past week, to what extent has your arm, shoulder, or hand problem interfered with your normal social activites with family, friends, neighbors or groups?: Slightly  During the past week, were you limited in  your work or other regular daily activities as a result of your arm, shoulder or hand problem?: Not limited at all  Arm, Shoulder, or hand pain: Mild  Tingling (pins and needles) in your arm, shoulder, or hand: Mild  During the past week, how much difficulty have you had sleeping because of the pain in your arm, shoulder or hand?: No difficulty  Number of Questions Answered: 11  Quick DASH Score: 45.45         Time Calculation:   OT Start Time: 1019  OT Stop Time: 1045  OT Time Calculation (min): 26 min     Therapy Charges for Today       Code Description Service Date Service Provider Modifiers Qty    25127698706  OT RE-EVAL 2 12/12/2023 Diana Richmond OTR GO 1                       JESSY Medina  12/12/2023

## 2023-12-12 NOTE — THERAPY TREATMENT NOTE
Outpatient Physical Therapy Neuro Treatment Note   [place]     Patient Name: Delphine King  : 1985  MRN: [id number]  Today's Date: 2023      Visit Date: 2023    Visit Dx:    ICD-10-CM ICD-9-CM   1. MMN (multifocal motor neuropathy) ([facility]): Receieved IVIG  G61.82 357.89       Patient Active Problem List   Diagnosis    Rectovaginal fistula: s/p repair in 2016    History of pulmonary embolism: lifelong anticoagulation    MMN (multifocal motor neuropathy)    Morbid (severe) obesity due to excess calories (*specify comorbidity)                        PT Assessment/Plan       Row Name 23 1604          PT Assessment    Functional Limitations Decreased safety during functional activities;Impaired gait;Limitation in home management;Limitations in community activities;Performance in leisure activities;Performance in self-care ADL  -JV     Impairments Balance;Coordination;Endurance;Gait;Impaired muscle endurance;Motor function;Muscle strength  -JV     Assessment Comments The pt presents for tx and reports she is having a good month, and has only been using cane when out in the community. The pt was assessed with the LEFS, 5XSTS, and TUG. Pt's score on the LEFS improved from 26 to 34 points, 5XSTS time improved from 19.5 sec to 17.6 sec, and TUG average decreased from 10.24 sec to 9.43 sec.  -JV        PT Plan    PT Plan Comments Cont plan, assess monthly as needed.  -JV               User Key  (r) = Recorded By, (t) = Taken By, (c) = Cosigned By      Initials Name Provider Type    Cleopatra Glynn, PT Physical Therapist                        OP Exercises       Row Name 23 1100             Precautions    Existing Precautions/Restrictions fall  -JV         Subjective    Subjective Comments Pt reports her next infusion is .  -JV         Exercise 1    Exercise Name 1 Pt was assessed with 5XSTS, TUG, and LEFS.  -JV                User Key  (r) = Recorded By, (t) = Taken By, (c) =  Cosigned By      Initials Name Provider Type    Cleopatra Glynn, PT Physical Therapist                                    Therapy Education  Education Details: Cont HEP  Given: HEP, Symptoms/condition management  Program: Reinforced  How Provided: Verbal, Demonstration  Provided to: Patient  Level of Understanding: Teach back education performed, Verbalized, Demonstrated    Outcome Measure Options: 5x Sit to Stand, Lower Extremity Functional Scale (LEFS), Timed Up and Go (TUG)  5 Times Sit to Stand  5 Times Sit to Stand (seconds): 17.62 seconds  5 Times Sit to Stand Comments: Previous time 19.5  Lower Extremity Functional Index  Any of your usual work, housework or school activities: A little bit of difficulty  Your usual hobbies, recreational or sporting activities: A little bit of difficulty  Getting into or out of the bath: A little bit of difficulty  Walking between rooms: No difficulty  Putting on your shoes or socks: A little bit of difficulty  Squatting: Extreme difficulty or unable to perform activity  Lifting an object, like a bag of groceries from the floor: Moderate difficulty  Performing light activities around your home: A little bit of difficulty  Performing heavy activities around your home: Extreme difficulty or unable to perform activity  Getting into or out of a car: Moderate difficulty  Walking 2 blocks: Moderate difficulty  Walking a mile: Extreme difficulty or unable to perform activity  Going up or down 10 stairs (about 1 flight of stairs): Moderate difficulty  Standing for 1 hour: Moderate difficulty  Sitting for 1 hour: Moderate difficulty  Running on even ground: Extreme difficulty or unable to perform activity  Running on uneven ground: Extreme difficulty or unable to perform activity  Making sharp turns while running fast: Extreme difficulty or unable to perform activity  Hopping: Extreme difficulty or unable to perform activity  Rolling over in bed: A little bit of difficulty  Total:  34  Timed Up and Go (TUG)  TUG Test 1: 9.51 seconds  TUG Test 2: 9.34 seconds  Timed Up and Go Comments: Previous scores 10.26 and 10.21      Time Calculation:   Start Time: 1100  Stop Time: 1145  Time Calculation (min): 45 min   Therapy Charges for Today       Code Description Service Date Service Provider Modifiers Qty    49929227742  PT THERAPEUTIC ACT EA 15 MIN 12/12/2023 Cleopatra Hoffmann, PT GP 3            PT G-Codes  Outcome Measure Options: 5x Sit to Stand, Lower Extremity Functional Scale (LEFS), Timed Up and Go (TUG)  Total: 34  TUG Test 1: 9.51 seconds  TUG Test 2: 9.34 seconds         Cleopatra Hoffmann, PT  12/12/2023

## 2024-01-09 ENCOUNTER — HOSPITAL ENCOUNTER (OUTPATIENT)
Dept: PHYSICAL THERAPY | Facility: HOSPITAL | Age: 39
Setting detail: THERAPIES SERIES
Discharge: HOME | End: 2024-01-09
Payer: COMMERCIAL

## 2024-01-09 ENCOUNTER — HOSPITAL ENCOUNTER (OUTPATIENT)
Dept: OCCUPATIONAL THERAPY | Facility: HOSPITAL | Age: 39
Setting detail: THERAPIES SERIES
Discharge: HOME | End: 2024-01-09
Payer: COMMERCIAL

## 2024-01-09 DIAGNOSIS — G89.29 CHRONIC PAIN OF LEFT KNEE: Primary | ICD-10-CM

## 2024-01-09 DIAGNOSIS — G61.82 MULTIFOCAL MOTOR NEUROPATHY: Primary | ICD-10-CM

## 2024-01-09 DIAGNOSIS — G61.82 MMN (MULTIFOCAL MOTOR NEUROPATHY): Primary | ICD-10-CM

## 2024-01-09 DIAGNOSIS — M25.562 CHRONIC PAIN OF LEFT KNEE: Primary | ICD-10-CM

## 2024-01-09 DIAGNOSIS — G61.82 MMN (MULTIFOCAL MOTOR NEUROPATHY): ICD-10-CM

## 2024-01-09 PROCEDURE — 97168 OT RE-EVAL EST PLAN CARE: CPT

## 2024-01-09 PROCEDURE — 97161 PT EVAL LOW COMPLEX 20 MIN: CPT | Performed by: PHYSICAL THERAPIST

## 2024-01-09 PROCEDURE — 97530 THERAPEUTIC ACTIVITIES: CPT

## 2024-01-09 NOTE — THERAPY PROGRESS REPORT/RE-CERT
Outpatient Physical Therapy Neuro Progress Note   [place]     Patient Name: Delphine King  : 1985  MRN: [id number]  Today's Date: 2024      Visit Date: 2024    Visit Dx:    ICD-10-CM ICD-9-CM   1. MMN (multifocal motor neuropathy) ([facility]): Receieved IVIG  G61.82 357.89       Patient Active Problem List   Diagnosis    Rectovaginal fistula: s/p repair in 2016    History of pulmonary embolism: lifelong anticoagulation    MMN (multifocal motor neuropathy)    Morbid (severe) obesity due to excess calories (*specify comorbidity)                        PT Assessment/Plan       Row Name 24 1800          PT Assessment    Functional Limitations Decreased safety during functional activities;Impaired gait;Limitation in home management;Limitations in community activities;Performance in leisure activities;Performance in self-care ADL  -JV     Impairments Balance;Coordination;Endurance;Gait;Impaired muscle endurance;Motor function;Muscle strength  -JV     Assessment Comments The pt presents for tx and re-assessment, reports not having a very good month since the Holidays. Pt was also evaluated by Ortho PT prior to session and was fatigued. The pt was assessed with the LEFS, TUG and MiniBest. 5XSTS was witheld due to pt's fatigue. Score on the LEFS decreased from 34 to 33, time on the TUG increased by about 1 sec. The MiniBest test was admin for the first time this date and score was 21/28.  -JV     Patient/caregiver participated in establishment of treatment plan and goals Yes  -JV        PT Plan    PT Plan Comments Cont plan, assess monthly as needed.  -JV               User Key  (r) = Recorded By, (t) = Taken By, (c) = Cosigned By      Initials Name Provider Type    Cleopatra Glynn, PT Physical Therapist                        OP Exercises       Row Name 24 1100             Precautions    Existing Precautions/Restrictions fall  -JV         Subjective    Subjective Comments Pt reports her  next infusion is 1/15-17/24.  -JV         Exercise 1    Exercise Name 1 Pt was assessed with miniBEST, TUG, and LEFS.  -JV                User Key  (r) = Recorded By, (t) = Taken By, (c) = Cosigned By      Initials Name Provider Type    Cleopatra Glynn, SARAH Physical Therapist                                 PT OP Goals       Row Name 01/09/24 1100          PT Short Term Goals    STG Date to Achieve 09/08/23  -JV     STG 1 Pt will be I with HEP  -JV     STG 1 Progress Ongoing  -JV     STG 2 Admin DGI at next visit  -JV     STG 2 Progress Met  -JV        Long Term Goals    LTG Date to Achieve 02/08/24  -JV     LTG 1 Pt will amb up/down steps with reciprocal pattern and 1 HR or cane.  -JV     LTG 1 Progress Progressing  -JV     LTG 2 Pt will improve 5XSST time to < 16.5 sec.  -JV     LTG 3 Pt will increase Oj LE strength to 4/5  -JV        Time Calculation    PT Goal Re-Cert Due Date 02/08/24  -JV               User Key  (r) = Recorded By, (t) = Taken By, (c) = Cosigned By      Initials Name Provider Type    Cleopatra Glynn PT Physical Therapist                    Therapy Education  Education Details: Cont HEP  Given: HEP  Program: Reinforced  How Provided: Verbal  Provided to: Patient  Level of Understanding: Teach back education performed, Verbalized    Outcome Measure Options: Lower Extremity Functional Scale (LEFS), Mini-Best Test, Timed Up and Go (TUG)  5 Times Sit to Stand  5 Times Sit to Stand Comments: NT this date due to fatigue  Lower Extremity Functional Index  Any of your usual work, housework or school activities: A little bit of difficulty  Your usual hobbies, recreational or sporting activities: Extreme difficulty or unable to perform activity  Getting into or out of the bath: A little bit of difficulty  Walking between rooms: A little bit of difficulty  Putting on your shoes or socks: A little bit of difficulty  Squatting: Extreme difficulty or unable to perform activity  Lifting an object, like a  bag of groceries from the floor: Moderate difficulty  Performing light activities around your home: A little bit of difficulty  Performing heavy activities around your home: Quite a bit of difficulty  Getting into or out of a car: Moderate difficulty  Walking 2 blocks: Quite a bit of difficulty  Walking a mile: Quite a bit of difficulty  Going up or down 10 stairs (about 1 flight of stairs): Moderate difficulty  Standing for 1 hour: A little bit of difficulty  Sitting for 1 hour: A little bit of difficulty  Running on even ground: Extreme difficulty or unable to perform activity  Running on uneven ground: Extreme difficulty or unable to perform activity  Making sharp turns while running fast: Extreme difficulty or unable to perform activity  Hopping: Extreme difficulty or unable to perform activity  Rolling over in bed: A little bit of difficulty  Total: 33  Mini Best  Mini Best Score/Comments: 21  Timed Up and Go (TUG)  TUG Test 1: 10.33 seconds  TUG Test 2: 13.9 seconds  Timed Up and Go Comments: Second admin with dual task from MiniBest      Time Calculation:   Start Time: 1115  Stop Time: 1200  Time Calculation (min): 45 min   Therapy Charges for Today       Code Description Service Date Service Provider Modifiers Qty    99407233440  PT THERAPEUTIC ACT EA 15 MIN 1/9/2024 Cleopatra Hoffmann, PT GP 3            PT G-Codes  Outcome Measure Options: Lower Extremity Functional Scale (LEFS), Mini-Best Test, Timed Up and Go (TUG)  Total: 33  TUG Test 1: 10.33 seconds  TUG Test 2: 13.9 seconds         Cleopatra Hoffmann, PT  1/9/2024

## 2024-01-09 NOTE — THERAPY EVALUATION
Outpatient Physical Therapy Ortho Initial Evaluation   [place]     Patient Name: Delphine King  : 1985  MRN: [id number]  Today's Date: 2024      Visit Date: 2024    Patient Active Problem List   Diagnosis    Rectovaginal fistula: s/p repair in     History of pulmonary embolism: lifelong anticoagulation    MMN (multifocal motor neuropathy)    Morbid (severe) obesity due to excess calories (*specify comorbidity)        Past Medical History:   Diagnosis Date    Anxiety     Asthma     Cholelithiasis     Colon polyp     Deep vein thrombosis     Depression     Fissure, anal 2017    Hyperlipidemia     Kidney stones     MMN (multifocal motor neuropathy)     Neuropathy     PE (pulmonary thromboembolism) 2017    Stroke         Past Surgical History:   Procedure Laterality Date    ANAL FISTULA REPAIR      multiple repairs     SECTION      CHOLECYSTECTOMY WITH INTRAOPERATIVE CHOLANGIOGRAM N/A 2023    Procedure: CHOLECYSTECTOMY LAPAROSCOPIC INTRAOPERATIVE CHOLANGIOGRAM;  Surgeon: Goldie Mandujano DO;  Location: [facility];  Service: General;  Laterality: N/A;    ENDOSCOPY N/A 2023    Procedure: ESOPHAGOGASTRODUODENOSCOPY WITH COLD BX'S;  Surgeon: Mitch Barclay MD;  Location: [facility] ENDOSCOPY;  Service: Gastroenterology;  Laterality: N/A;  pre: abdominal pain  post: gastritis    ERCP N/A 2023    Procedure: ENDOSCOPIC RETROGRADE CHOLANGIOPANCREATOGRAPHY WITH SPHINCTEROTOMY AND BALOON SWEEP;  Surgeon: Mitch Barclay MD;  Location: [facility] ENDOSCOPY;  Service: Gastroenterology;  Laterality: N/A;  pre: biliary obstruction, choledocholithiasis  post: choledocholithiasis    HYSTERECTOMY  2017    PORTACATH PLACEMENT Right     power port    TONSILLECTOMY  2003    WISDOM TOOTH EXTRACTION         Visit Dx:     ICD-10-CM ICD-9-CM   1. Chronic pain of left knee  M25.562 719.46    G89.29 338.29          Patient History       Row Name 24 1000             History    Chief  "Complaint Balance Problems;Difficulty Walking;Difficulty with daily activities  -SP      Date Current Problem(s) Began --  approximately 9 years  -SP      Brief Description of Current Complaint per November 2022 evaluation \"Patient reports she was diagnosed with MMN approximately 9 years ago. Initial symptoms began in 2009 with right hand pain and weakness. In 2011 pt noticed difficulty climbing stairs. In 2012 patient's left hand became weaker. Patient was receiving IVIG on a regular schedule when she lived in [place] and states she was functioning well. Patient moved to [place] about 2 years ago and has been unable to continue with IVIG therapy until last week and reports she has noticed a significant decline without IVIG. Patient reports she has difficulty with writing, buttoning, zipping, dressing, toilet transfers, bed mobility, walking and doing stairs\"  Patient presents today for PT evaluation with diagnosis of \"chronic pain of left knee\"    1-9-2024 Patient reports that she is not having any knee pain.  She reports that she has one step at home and typically ambulates stairs leading with her right LE.  She states that if she tries to lead with her left LE, her leg feel weak and she is unsteady.  Patient states that her knee hyperextends and she is interested in getting a brace to control this and she also wants to work on her ability to ambulate on stairs with reciprocal pattern.  Patient is currently attending PT monthly to address strength in regards to IVIG  -SP      Previous treatment for THIS PROBLEM Rehabilitation  -SP      Patient/Caregiver Goals Improve strength;Improve mobility  -SP      Patient's Rating of General Health Fair  -SP      Hand Dominance right-handed  -SP      Patient seeing anyone else for problem(s)? OT and PT  -SP         Pain     Pain at Present 0  -SP      Pain at Worst 0  -SP      Pain Description --  patient reports no pain  -SP      What Performance Factors Make the Current " Problem(s) WORSE? Patient reports that she is having difficulty ascending stairs leading with left LE due to feeling of weakness/instability.  -SP      What Performance Factors Make the Current Problem(s) BETTER? patient reports that her strength, mobility and gait are improved following IVIG  -SP      Tolerance Time- Standing varies depending on timing of IVIG and activity  -SP      Tolerance Time- Walking varies depending on IVIG and activity  -SP      Difficulties with ADL's? difficulty with stairs  -SP         Fall Risk Assessment    Any falls in the past year: Yes  -SP      Number of falls reported in the last 12 months 3-4  -SP      Factors that contributed to the fall: Tripped  -SP         Daily Activities    Primary Language English  -SP      Are you able to read Yes  -SP      Are you able to write Yes  -SP      How does patient learn best? Listening;Reading;Demonstration;Pictures/Video  -SP      Teaching needs identified Home Exercise Program;Management of Condition  -SP      Patient is concerned about/has problems with Climbing Stairs  -SP      Does patient have problems with the following? Depression;Anxiety  -SP      Barriers to learning None  -SP         Safety    Are you being hurt, hit, or frightened by anyone at home or in your life? No  -SP      Are you being neglected by a caregiver No  -SP                User Key  (r) = Recorded By, (t) = Taken By, (c) = Cosigned By      Initials Name Provider Type    Radha Call, PT Physical Therapist                     PT Ortho       Row Name 01/09/24 1200       Precautions and Contraindications    Precautions/Limitations no known precautions/limitations  -SP       Subjective Pain    Able to rate subjective pain? yes  -SP    Pre-Treatment Pain Level 0  -SP    Post-Treatment Pain Level 0  -SP       Posture/Observations    Posture/Observations Comments mild genu recurvatum left >right  -SP       Knee (Tibiofemoral) Accessory Motions    Posterior  glide of tibia on femur Hypermobile  -SP    Anterior glide of tibia on femur Hypermobile  -SP       Knee Special Tests    Anterior drawer (ACL lesion) Left:  lax with testing; no pain  -SP    Posterior drawer (PCL lesion) --  mild laxity-good end feel; no pain  -SP    Posterior sag sign (PCL lesion) Left:;Negative  -SP    Valgus stress (MCL lesion) Left:;Negative  -SP    Varus stress (LCL lesion) Left:;Negative  -SP    Knee Special Tests Comments patient with generalized ligamentous laxity but with no pain upon testing and good end feel with PCL assessment despite genu recurvatum and instability observed with gait  -SP       Right Lower Ext    Rt Knee Extension/Flexion AROM 4-0-114  -SP       Left Lower Ext    Lt Knee Extension/Flexion AROM 7-0-114  -SP       MMT Right Lower Ext    Rt Hip Flexion MMT, Gross Movement (3+/5) fair plus  -SP    Rt Hip Extension MMT, Gross Movement (3/5) fair  -SP    Rt Hip ABduction MMT, Gross Movement (3+/5) fair plus  -SP    Rt Hip ADduction MMT, Gross Movement (4-/5) good minus  -SP    Rt Knee Extension MMT, Gross Movement (4-/5) good minus  -SP    Rt Ankle Dorsiflexion MMT, Gross Movement (3/5) fair  -SP       MMT Left Lower Ext    Lt Hip Flexion MMT, Gross Movement (3+/5) fair plus  -SP    Lt Hip Extension MMT, Gross Movement (3/5) fair  -SP    Lt Hip ABduction MMT, Gross Movement (3-/5) fair minus  -SP    Lt Hip ADduction MMT, Gross Movement (4-/5) good minus  -SP    Lt Knee Extension MMT, Gross Movement (4-/5) good minus  -SP    Lt Ankle Dorsiflexion MMT, Gross Movement (3+/5) fair plus  -SP       Transfers    Bed-Chair [place] (Transfers) independent  -SP    Chair-Bed [place] (Transfers) independent  -SP    Sit-Stand [place] (Transfers) independent  -SP    Stand-Sit [place] (Transfers) independent  -SP    Comment, (Transfers) patient states she walks in her home without assistive device at time and is observed to present with cane but ambulate without  assistive device in clinic  -SP       Gait/Stairs (Locomotion)    [place] Level (Gait) modified independence  -SP    Assistive Device (Gait) cane, straight;other (see comments)  also ambulates without cane in clinic and at home per patient report  -SP    Pattern (Gait) step-through  -SP    Deviations/Abnormal Patterns (Gait) base of support, wide;bilateral deviations;gait speed decreased;stride length decreased;ivania decreased  -SP    Left Sided Gait Deviations knee buckling, left side  intermittently and with attempts to reciprocate on stairs  -SP    Right Sided Gait Deviations heel strike decreased  -SP    [place] Level (Stairs) modified independence  -SP    Ascending Technique (Stairs) step-to-step  -SP    Descending Technique (Stairs) step-to-step  -SP    Comment, (Gait/Stairs) Patient attempts to ambulate with reciprocal pattern but exhibits left knee instability and requires use of bilateral rails.  Patient able to safely ambulate with step to pattern on stairs with right LE lead.  -SP              User Key  (r) = Recorded By, (t) = Taken By, (c) = Cosigned By      Initials Name Provider Type    Radha Call, PT Physical Therapist                                Therapy Education  Education Details: Patient given HEP to include quad set, supine and sidelying clam, bridges, sit to stand vs tband, and TKE.  Given: HEP  Program: New  How Provided: Verbal, Written  Provided to: Patient  Level of Understanding: Verbalized, Demonstrated          PT Assessment/Plan       Row Name 01/09/24 2936          PT Assessment    Assessment Comments Patient with diagnosis of multifocal motor neuropathy currently treated with monthly IVIG.  Patient has been primarily experiencing right side weakness including foot drop with occasional need for AFO.  She is experiencing increasing left LE weakness especially with attempts to ambulate stairs.  Patient presents with significant bilateral LE weakness.  She  does have generalized ligamentous knee laxity and genu recurvatum.  Patient would likely benefit from hyperextension stabilization type support/brace to allow safety with mobility.  -SP        PT Plan    PT Plan Comments Spoke with patient's lobo PT regarding obtaining bracing for patient.   Patient to continue follow up with Neuro PT  -SP               User Key  (r) = Recorded By, (t) = Taken By, (c) = Cosigned By      Initials Name Provider Type    Radha Call PT Physical Therapist                       OP Exercises       Row Name 01/09/24 1200             Subjective Pain    Able to rate subjective pain? yes  -SP      Pre-Treatment Pain Level 0  -SP      Post-Treatment Pain Level 0  -SP                User Key  (r) = Recorded By, (t) = Taken By, (c) = Cosigned By      Initials Name Provider Type    Radha Call PT Physical Therapist                                            Time Calculation:     Start Time: 0900  Stop Time: 1000  Time Calculation (min): 60 min  Untimed Charges  PT Eval/Re-eval Minutes: 30  Total Minutes  Untimed Charges Total Minutes: 30   Total Minutes: 30     Therapy Charges for Today       Code Description Service Date Service Provider Modifiers Qty    05472908175 HC PT EVAL LOW COMPLEXITY 2 1/9/2024 Radha Solorio, PT GP 1                      Radha Soloroi PT  1/9/2024

## 2024-01-09 NOTE — THERAPY PROGRESS REPORT/RE-CERT
Outpatient Occupational Therapy Neuro Re-Evaluation   [facility]     Patient Name: Delphine King  : 1985  MRN: [id number]  Today's Date: 2024      Visit Date: 2024    Patient Active Problem List   Diagnosis    Rectovaginal fistula: s/p repair in     History of pulmonary embolism: lifelong anticoagulation    MMN (multifocal motor neuropathy)    Morbid (severe) obesity due to excess calories (*specify comorbidity)        Past Medical History:   Diagnosis Date    Anxiety     Asthma     Cholelithiasis     Colon polyp     Deep vein thrombosis     Depression     Fissure, anal 2017    Hyperlipidemia     Kidney stones     MMN (multifocal motor neuropathy)     Neuropathy     PE (pulmonary thromboembolism) 2017    Stroke         Past Surgical History:   Procedure Laterality Date    ANAL FISTULA REPAIR      multiple repairs     SECTION      CHOLECYSTECTOMY WITH INTRAOPERATIVE CHOLANGIOGRAM N/A 2023    Procedure: CHOLECYSTECTOMY LAPAROSCOPIC INTRAOPERATIVE CHOLANGIOGRAM;  Surgeon: Goldie Mandujano DO;  Location: [facility];  Service: General;  Laterality: N/A;    ENDOSCOPY N/A 2023    Procedure: ESOPHAGOGASTRODUODENOSCOPY WITH COLD BX'S;  Surgeon: Mitch Barclay MD;  Location: [facility] ENDOSCOPY;  Service: Gastroenterology;  Laterality: N/A;  pre: abdominal pain  post: gastritis    ERCP N/A 2023    Procedure: ENDOSCOPIC RETROGRADE CHOLANGIOPANCREATOGRAPHY WITH SPHINCTEROTOMY AND BALOON SWEEP;  Surgeon: Mitch Barclay MD;  Location: [facility] ENDOSCOPY;  Service: Gastroenterology;  Laterality: N/A;  pre: biliary obstruction, choledocholithiasis  post: choledocholithiasis    HYSTERECTOMY  2017    PORTACATH PLACEMENT Right     power port    TONSILLECTOMY  2003    WISDOM TOOTH EXTRACTION  2003         Visit Dx:      ICD-10-CM ICD-9-CM   1. Multifocal motor neuropathy  G61.82 357.89   2. MMN (multifocal motor neuropathy) (HCC): Receieved IVIG  G61.82 357.89            OT Neuro        Row Name 01/09/24 0903             Subjective Comments    Subjective Comments Pt reports no significant issues this past month. Pt reports benefits of infusions last for about 2 weeks each month. Pt does state that her right UE seems a bit weaker than normal today.  -SD         Subjective Pain    Subjective Pain Comment Pt reports no pain.  -SD         Coordination    9-Hole Peg Left 16.23  -SD      9-Hole Peg Right 18.08  -SD         General ROM    GENERAL ROM COMMENTS B shoulder/elbow AROM WFL. L wrist/hand WFL, right wrist drop and decreased R finger extension in digits 3, 4, and 5.  -SD         Right Upper Ext    Rt Shoulder Abduction AROM WFL  -SD      Rt Shoulder Flexion AROM WFL  -SD      Rt Elbow Supination AROM WFL  -SD      Rt Elbow Pronation AROM WFL  -SD      Rt Wrist Flexion AROM WFL  -SD      Rt Wrist Extension AROM 10 year history of wrist drop which is worse some days than others. Patient able to extend wrist with effort, limited finger extension in digits 3, 4, and 5  -SD         Left Upper Ext    Lt Shoulder Abduction AROM WFL  -SD      Lt Shoulder Flexion AROM WFL  -SD      Lt Elbow Extension/Flexion AROM WFL  -SD      Lt Elbow Supination AROM WFL  -SD      Lt Elbow Pronation AROM WFL  -SD      Lt Wrist Flexion AROM WFL  -SD      Lt Wrist Extension AROM WFL  -SD         MMT Right Upper Ext    Rt Shoulder Flexion MMT, Gross Movement (4-/5) good minus  -SD      Rt Shoulder Extension MMT, Gross Movement (4/5) good  -SD      Rt Shoulder ABduction MMT, Gross Movement (4-/5) good minus  -SD      Rt Shoulder ADduction MMT, Gross Movement (4/5) good  -SD      Rt Elbow Flexion MMT, Gross Movement: (4/5) good  -SD      Rt Elbow Extension MMT, Gross Movement: (4/5) good  -SD      Rt Forearm Supination MMT, Gross Movement (4/5) good  -SD      Rt Forearm Pronation MMT, Gross Movement (4/5) good  -SD      Rt Wrist Flexion MMT, Gross Movement (3/5) fair  -SD      Rt Wrist Extension MMT, Gross Movement (3/5)  fair  -SD         MMT Left Upper Ext    Lt Shoulder Flexion MMT, Gross Movement (4+/5) good plus  -SD      Lt Shoulder Extension MMT, Gross Movement (5/5) normal  -SD      Lt Shoulder ABduction MMT, Gross Movement (4+/5) good plus  -SD      Lt Shoulder ADduction MMT, Gross Movement (5/5) normal  -SD      Lt Elbow Flexion MMT, Gross Movement (5/5) normal  -SD      Lt Elbow Extension MMT, Gross Movement (5/5) normal  -SD      Lt Forearm Supination MMT, Gross Movement (5/5) normal  -SD      Lt Forearm Pronation MMT, Gross Movement (5/5) normal  -SD      Lt Wrist Flexion MMT, Gross Movement (5/5) normal  -SD      Lt Wrist Extension MMT, Gross Movement (5/5) normal  -SD                User Key  (r) = Recorded By, (t) = Taken By, (c) = Cosigned By      Initials Name Provider Type    Paramjit Scott OTR Occupational Therapist                    Hand Therapy (last 24 hours)       Hand Eval       Row Name 01/09/24 0905              Strength Right    # Reps 3  Average last month 18.67  -SD      Right Rung 2  -SD      Right  Test 1 11  -SD      Right  Test 2 11  -SD      Right  Test 3 12  -SD       Strength Average Right 11.33  -SD          Strength Left    # Reps 3  Average last month 45  -SD      Left Rung 2  -SD      Left  Test 1 50  -SD      Left  Test 2 52  -SD      Left  Test 3 56  -SD       Strength Average Left 52.67  -SD         Right Hand Strength - Pinch (lbs)    Lateral 6 lbs  last month 6#  -SD      Tip (3 point) 6.67 lbs  last month 6.3#  -SD         Left Hand Strength - Pinch (lbs)    Lateral 11.33 lbs  last month 13.33  -SD      Tip (3 point) 15 lbs  last month 13.33  -SD                User Key  (r) = Recorded By, (t) = Taken By, (c) = Cosigned By      Initials Name Provider Type    Paramjit cSott OTR Occupational Therapist                        Therapy Education  Education Details: Reinforced HEP for BUE rom, strengthening and coordination  Given: HEP,  Symptoms/condition management  Program: Reinforced  How Provided: Verbal, Demonstration  Provided to: Patient  Level of Understanding: Teach back education performed, Verbalized, Demonstrated     OT Goals       Row Name 01/09/24 1000          OT Short Term Goals    STG Date to Achieve 12/13/23  -SD     STG 1 Patient will demonstrate improved right  strength by 3# for improved grasp of objects. (Currently 25# pounds)  -SD     STG 1 Progress Ongoing  -SD     STG 2 Patient will report modified independence with ADL/IADL routine (compensatory strategies, adaptive equipment, etc..)  -SD     STG 2 Progress Met  -SD        Long Term Goals    LTG Date to Achieve 01/16/24  -SD     LTG 1 Patient will demonstrate improved fine motor coordination evidenced by a 5 second improved 9 hole peg score (right hand)  -SD     LTG 1 Progress Met  -SD     LTG 2 Patient will demonstrate improved right hand  strength by 20 pounds for improved grasp of objects.(30#)  -SD     LTG 2 Progress Ongoing  right  decreased this month  -SD     LTG 3 Patient will improve Quick Dash score 50 points.  -SD     LTG 3 Progress Ongoing;Progressing  -SD               User Key  (r) = Recorded By, (t) = Taken By, (c) = Cosigned By      Initials Name Provider Type    Paramjit Scott, OTR Occupational Therapist                            OT Assessment/Plan       Row Name 01/09/24 1054          OT Assessment    Functional Limitations Limitation in home management;Limitations in community activities;Performance in leisure activities;Performance in self-care ADL  -SD     Impairments Coordination;Dexterity;Muscle strength;Pain;Range of motion  -SD     Assessment Comments Pt reports no significant issues this past month. Pt reports benefits of infusions last for about 2 weeks each month. Pt does state that her right UE seems a bit weaker than normal today. Right UE strength is approximately a muscle grade lower than in prior month (4 to 4+/5 last  month and 4- to 4/5 this month). Pt also demonstrated a decrease in functional right  strength from 18.67# to 11.33#. No significant change in functional coordination as demonstrated by 9 hole peg test (right 16.33 today and 15.84 seconds last month and left 18.08 today and 17.85 seconds last month). Pt did demonstrate an increase in left  strength today (45# to 52#). Reinforced pt's HEP to address daily rom, light strengthening and functional coordination activity.  -SD     OT Diagnosis MMN, decreased B UE functional use, oscar. R UE  -SD        OT Plan    OT Plan Comments continue with plan  -SD               User Key  (r) = Recorded By, (t) = Taken By, (c) = Cosigned By      Initials Name Provider Type    Paramjit Scott, OTR Occupational Therapist                        Outcome Measure Options: Quick DASH  9 Hole Peg  9-Hole Peg Left: 16.23  9-Hole Peg Right: 18.08  Quick DASH  Open a tight or new jar.: Moderate Difficulty  Do heavy household chores (e.g., wash walls, wash floors): Severe Difficulty  Carry a shopping bag or briefcase: Mild Difficulty  Wash your back: Unable  Use a knife to cut food: Moderate Difficulty  Recreational activities in which you take some force or impact through your arm, should or hand (e.g. golf, hammering, tennis, etc.): Unable  During the past week, to what extent has your arm, shoulder, or hand problem interfered with your normal social activites with family, friends, neighbors or groups?: Slightly  During the past week, were you limited in your work or other regular daily activities as a result of your arm, shoulder or hand problem?: Not limited at all  Arm, Shoulder, or hand pain: Mild  Tingling (pins and needles) in your arm, shoulder, or hand: Mild  During the past week, how much difficulty have you had sleeping because of the pain in your arm, shoulder or hand?: No difficulty  Number of Questions Answered: 11  Quick DASH Score: 43.18         Time Calculation:   OT  Start Time: 0955  OT Stop Time: 1030  OT Time Calculation (min): 35 min  Untimed Charges  OT Eval/Re-eval Minutes: 30  Total Minutes  Untimed Charges Total Minutes: 30   Total Minutes: 30     Therapy Charges for Today       Code Description Service Date Service Provider Modifiers Qty    36583762196 HC OT RE-EVAL 2 1/9/2024 Praamjit Bradshaw, OTR GO 1                       JESSY Kent  1/9/2024

## 2024-02-06 ENCOUNTER — HOSPITAL ENCOUNTER (OUTPATIENT)
Dept: PHYSICAL THERAPY | Facility: HOSPITAL | Age: 39
Setting detail: THERAPIES SERIES
Discharge: HOME | End: 2024-02-06
Payer: COMMERCIAL

## 2024-02-06 ENCOUNTER — HOSPITAL ENCOUNTER (OUTPATIENT)
Dept: OCCUPATIONAL THERAPY | Facility: HOSPITAL | Age: 39
Setting detail: THERAPIES SERIES
Discharge: HOME | End: 2024-02-06
Payer: COMMERCIAL

## 2024-02-06 DIAGNOSIS — G61.82 MMN (MULTIFOCAL MOTOR NEUROPATHY): Primary | ICD-10-CM

## 2024-02-06 DIAGNOSIS — G61.82 MMN (MULTIFOCAL MOTOR NEUROPATHY): ICD-10-CM

## 2024-02-06 DIAGNOSIS — G61.82 MULTIFOCAL MOTOR NEUROPATHY: Primary | ICD-10-CM

## 2024-02-06 PROCEDURE — 97168 OT RE-EVAL EST PLAN CARE: CPT

## 2024-02-06 PROCEDURE — 97530 THERAPEUTIC ACTIVITIES: CPT

## 2024-02-06 NOTE — THERAPY EVALUATION
Outpatient Occupational Therapy Ortho Re-Evaluation   Robyn Karimi     Patient Name: Delphine King  : 1985  MRN: [id number]  Today's Date: 2024      Visit Date: 2024    Patient Active Problem List   Diagnosis    Rectovaginal fistula: s/p repair in     History of pulmonary embolism: lifelong anticoagulation    MMN (multifocal motor neuropathy)    Morbid (severe) obesity due to excess calories (*specify comorbidity)        Past Medical History:   Diagnosis Date    Anxiety     Asthma     Cholelithiasis     Colon polyp     Deep vein thrombosis     Depression     Fissure, anal 2017    Hyperlipidemia     Kidney stones     MMN (multifocal motor neuropathy)     Neuropathy     PE (pulmonary thromboembolism) 2017    Stroke         Past Surgical History:   Procedure Laterality Date    ANAL FISTULA REPAIR      multiple repairs     SECTION      CHOLECYSTECTOMY WITH INTRAOPERATIVE CHOLANGIOGRAM N/A 2023    Procedure: CHOLECYSTECTOMY LAPAROSCOPIC INTRAOPERATIVE CHOLANGIOGRAM;  Surgeon: Goldie Mandujano DO;  Location: [facility];  Service: General;  Laterality: N/A;    ENDOSCOPY N/A 2023    Procedure: ESOPHAGOGASTRODUODENOSCOPY WITH COLD BX'S;  Surgeon: Mitch Barclay MD;  Location: [facility] ENDOSCOPY;  Service: Gastroenterology;  Laterality: N/A;  pre: abdominal pain  post: gastritis    ERCP N/A 2023    Procedure: ENDOSCOPIC RETROGRADE CHOLANGIOPANCREATOGRAPHY WITH SPHINCTEROTOMY AND BALOON SWEEP;  Surgeon: Mitch Barclay MD;  Location: [facility] ENDOSCOPY;  Service: Gastroenterology;  Laterality: N/A;  pre: biliary obstruction, choledocholithiasis  post: choledocholithiasis    HYSTERECTOMY  2017    PORTACATH PLACEMENT Right     power port    TONSILLECTOMY  2003    WISDOM TOOTH EXTRACTION  2003         Visit Dx:    ICD-10-CM ICD-9-CM   1. Multifocal motor neuropathy  G61.82 357.89   2. MMN (multifocal motor neuropathy) (HCC): Receieved IVIG  G61.82 357.89            OT Ortho        Row Name 02/06/24 1000             Subjective Pain    Subjective Pain Comment no pain reported  -SD         Right Hand Strength - Pinch (lbs)    Lateral 6.3 lbs  last month 6#  -SD      Tip (3 point) 6.67 lbs  last month 6.67  -SD         Left Hand Strength - Pinch (lbs)    Lateral 12 lbs  -SD      Tip (3 point) 15.67 lbs  -SD                User Key  (r) = Recorded By, (t) = Taken By, (c) = Cosigned By      Initials Name Provider Type    SD Paramjit Bradshaw OTR Occupational Therapist                   OT Neuro       Row Name 02/06/24 1000             Subjective Comments    Subjective Comments Pt reports no significant changes from last month. She states her treatments are being changed from every 4 weeks to every 5 weeks.  -SD         Coordination    9-Hole Peg Left 15.16  -SD      9-Hole Peg Right 20.42  -SD         General ROM    GENERAL ROM COMMENTS B shoulder/elbow AROM WFL. L wrist/hand WFL, right wrist drop and decreased R finger extension in digits 3, 4, and 5.  -SD         Right Upper Ext    Rt Shoulder Abduction AROM wfl  -SD      Rt Shoulder Flexion AROM wfl  -SD      Rt Elbow Supination AROM wfl  -SD      Rt Elbow Pronation AROM wfl  -SD      Rt Wrist Flexion AROM wfl  -SD      Rt Wrist Extension AROM 10 year history of wrist drop which is worse some days than others. Patient able to extend wrist with effort, limited finger extension in digits 3, 4, and 5  -SD         Left Upper Ext    Lt Shoulder Abduction AROM wfl  -SD      Lt Shoulder Flexion AROM wfl  -SD      Lt Elbow Extension/Flexion AROM wfl  -SD      Lt Elbow Supination AROM wfl  -SD      Lt Elbow Pronation AROM wfl  -SD      Lt Wrist Flexion AROM wfl  -SD      Lt Wrist Extension AROM wfl  -SD         MMT Right Upper Ext    Rt Shoulder Flexion MMT, Gross Movement (4-/5) good minus  -SD      Rt Shoulder Extension MMT, Gross Movement (4/5) good  -SD      Rt Shoulder ABduction MMT, Gross Movement (4-/5) good minus  -SD      Rt Shoulder ADduction  MMT, Gross Movement (4/5) good  -SD      Rt Elbow Flexion MMT, Gross Movement: (4/5) good  -SD      Rt Elbow Extension MMT, Gross Movement: (4/5) good  -SD      Rt Forearm Supination MMT, Gross Movement (4/5) good  -SD      Rt Forearm Pronation MMT, Gross Movement (4/5) good  -SD      Rt Wrist Flexion MMT, Gross Movement (3+/5) fair plus  -SD      Rt Wrist Extension MMT, Gross Movement (3/5) fair  -SD         MMT Left Upper Ext    Lt Shoulder Flexion MMT, Gross Movement (4+/5) good plus  -SD      Lt Shoulder Extension MMT, Gross Movement (5/5) normal  -SD      Lt Shoulder ABduction MMT, Gross Movement (4+/5) good plus  -SD      Lt Shoulder ADduction MMT, Gross Movement (5/5) normal  -SD      Lt Elbow Flexion MMT, Gross Movement (5/5) normal  -SD      Lt Elbow Extension MMT, Gross Movement (5/5) normal  -SD      Lt Forearm Supination MMT, Gross Movement (5/5) normal  -SD      Lt Forearm Pronation MMT, Gross Movement (5/5) normal  -SD      Lt Wrist Flexion MMT, Gross Movement (5/5) normal  -SD      Lt Wrist Extension MMT, Gross Movement (5/5) normal  -SD                User Key  (r) = Recorded By, (t) = Taken By, (c) = Cosigned By      Initials Name Provider Type    Paramjit Scott, OTR Occupational Therapist                  Hand Therapy (last 24 hours)       Hand Eval       Row Name 02/06/24 1000             Subjective    Subjective Comments pt reports no significant changes in hand rom/strength compared to last month.  -SD         Subjective Pain    Able to rate subjective pain? yes  -SD      Pre-Treatment Pain Level 0  -SD      Post-Treatment Pain Level 0  -SD          Strength Right    # Reps 3  -SD      Right Rung 2  last month 11.33  strength for right  -SD      Right  Test 1 18  -SD      Right  Test 2 14  -SD      Right  Test 3 11  -SD       Strength Average Right 14.33  -SD          Strength Left    # Reps 3  -SD      Left Rung 2  last month  strength 52.67 for left  -SD       Left  Test 1 53  -SD      Left  Test 2 50  -SD      Left  Test 3 51  -SD       Strength Average Left 51.33  -SD                User Key  (r) = Recorded By, (t) = Taken By, (c) = Cosigned By      Initials Name Provider Type    Paramjit Scott, OTR Occupational Therapist                              OT Goals       Row Name 02/06/24 1200          OT Short Term Goals    STG Date to Achieve 12/13/23  -SD     STG 1 Patient will demonstrate improved right  strength by 3# for improved grasp of objects. (Currently 25# pounds)  -SD     STG 1 Progress Ongoing  -SD     STG 2 Patient will report modified independence with ADL/IADL routine (compensatory strategies, adaptive equipment, etc..)  -SD     STG 2 Progress Met  -SD        Long Term Goals    LTG Date to Achieve 01/16/24  -SD     LTG 1 Patient will demonstrate improved fine motor coordination evidenced by a 5 second improved 9 hole peg score (right hand)  -SD     LTG 1 Progress Met  -SD     LTG 2 Patient will demonstrate improved right hand  strength by 20 pounds for improved grasp of objects.(30#)  -SD     LTG 2 Progress Ongoing  right  decreased this month  -SD     LTG 3 Patient will improve Quick Dash score 50 points.  -SD     LTG 3 Progress Ongoing  -SD               User Key  (r) = Recorded By, (t) = Taken By, (c) = Cosigned By      Initials Name Provider Type    Paramjit Scott, OTR Occupational Therapist                     OT Assessment/Plan       Row Name 02/06/24 1224          OT Assessment    Assessment Comments Pt reports no significant issues this past month. Pt reports benefits of infusions last for about 2-3 weeks each month. Pt continues with more weakness in RUE compared LUE, yet no significant changes from prior months. Pt right /pinch strength is minimally improved from last month (right  14.33 today and 11.33 # last month). No significant change in functional coordination as demonstrated by 9 hole peg  test. Reinforced pt's HEP to address daily rom, light strengthening and functional coordination activity. Pt states plan is to change infusions from every 4 weeks to every 5 weeks. Will re-assess next month to determine if there are any functional changes with the increased interval between infusions.  -SD     OT Diagnosis MMN, decreased B UE functional use, oscar. R UE  -SD        OT Plan    OT Plan Comments continue with plan. Next appointment set for 5 weeks (infusions are being adjusted from every 4 weeks to every 5 weeks.  -SD               User Key  (r) = Recorded By, (t) = Taken By, (c) = Cosigned By      Initials Name Provider Type    Paramjit Scott, OTR Occupational Therapist                           Outcome Measure Options: Quick DASH  9 Hole Peg  9-Hole Peg Left: 15.16  9-Hole Peg Right: 20.42  Quick DASH  Open a tight or new jar.: Mild Difficulty  Do heavy household chores (e.g., wash walls, wash floors): Severe Difficulty  Carry a shopping bag or briefcase: Mild Difficulty  Wash your back: Unable  Use a knife to cut food: Moderate Difficulty  Recreational activities in which you take some force or impact through your arm, should or hand (e.g. golf, hammering, tennis, etc.): Unable  During the past week, to what extent has your arm, shoulder, or hand problem interfered with your normal social activites with family, friends, neighbors or groups?: Slightly  During the past week, were you limited in your work or other regular daily activities as a result of your arm, shoulder or hand problem?: Slightly Limited  Arm, Shoulder, or hand pain: Mild  Tingling (pins and needles) in your arm, shoulder, or hand: None  During the past week, how much difficulty have you had sleeping because of the pain in your arm, shoulder or hand?: No difficulty  Number of Questions Answered: 11  Quick DASH Score: 40.91         Time Calculation:    OT Start Time: 1016  OT Stop Time: 1045  OT Time Calculation (min): 29  min  Untimed Charges  OT Eval/Re-eval Minutes: 29  Total Minutes  Untimed Charges Total Minutes: 29   Total Minutes: 29     Therapy Charges for Today       Code Description Service Date Service Provider Modifiers Qty    76322575622  OT RE-EVAL 2 2/6/2024 Paramjit Bradshaw OTR GO 1                    JESSY Kent  2/6/2024

## 2024-02-06 NOTE — THERAPY TREATMENT NOTE
Outpatient Physical Therapy Neuro Treatment Note   [place]     Patient Name: Delphine King  : 1985  MRN: [id number]  Today's Date: 2024      Visit Date: 2024    Visit Dx:    ICD-10-CM ICD-9-CM   1. MMN (multifocal motor neuropathy) ([facility]): Receieved IVIG  G61.82 357.89       Patient Active Problem List   Diagnosis    Rectovaginal fistula: s/p repair in 2016    History of pulmonary embolism: lifelong anticoagulation    MMN (multifocal motor neuropathy)    Morbid (severe) obesity due to excess calories (*specify comorbidity)                        PT Assessment/Plan       Row Name 24 1545          PT Assessment    Functional Limitations Decreased safety during functional activities;Impaired gait;Limitation in home management;Limitations in community activities;Performance in leisure activities;Performance in self-care ADL  -JV     Impairments Balance;Coordination;Endurance;Gait;Impaired muscle endurance;Motor function;Muscle strength  -JV     Assessment Comments The pt presents this date for tx and assessment with the LEFS, TUG, 5XSTS, and Mini-BESTest. Next IVIG infusion is scheduled for -23. Pt reports Neuro is going to do a trial of 5 weeks in between infusions vs monthly. The pt's score on the LEFS decreased from 33 to 32 points, TUG average improved from 10.3 to 9.5, 5XSTS decreased from 17.62 to 25.42 sec, and Mini-BESTest score increased from 21 to 23/28.  -JV        PT Plan    PT Plan Comments Cont plan, assess monthly as needed.  -JV               User Key  (r) = Recorded By, (t) = Taken By, (c) = Cosigned By      Initials Name Provider Type    Cleopatra Glynn, PT Physical Therapist                        OP Exercises       Row Name 24 1100             Precautions    Existing Precautions/Restrictions fall  -JV         Subjective    Subjective Comments Pt reports she fell to her knees when there was snow on the ground and was unable to perform LE strengthening  for a couple of weeks due to pain.  -JV         Exercise 1    Exercise Name 1 Pt was assessed with miniBEST, TUG, 5XSTS and LEFS.  -JV                User Key  (r) = Recorded By, (t) = Taken By, (c) = Cosigned By      Initials Name Provider Type    Cleopatra Glynn, PT Physical Therapist                                    Therapy Education  Education Details: Cont HEP as vishal, contact [employer] for [employer] Knee cage for the L LE.  Given: HEP, Symptoms/condition management  Program: Reinforced  How Provided: Verbal  Provided to: Patient  Level of Understanding: Teach back education performed, Verbalized    Outcome Measure Options: Lower Extremity Functional Scale (LEFS), Mini-Best Test, 5x Sit to Stand, Timed Up and Go (TUG)  5 Times Sit to Stand  5 Times Sit to Stand (seconds): 25.42 seconds  5 Times Sit to Stand Comments: Previous time 17.62 sec  Lower Extremity Functional Index  Any of your usual work, housework or school activities: A little bit of difficulty  Your usual hobbies, recreational or sporting activities: Extreme difficulty or unable to perform activity  Getting into or out of the bath: A little bit of difficulty  Walking between rooms: A little bit of difficulty  Putting on your shoes or socks: A little bit of difficulty  Squatting: Extreme difficulty or unable to perform activity  Lifting an object, like a bag of groceries from the floor: Moderate difficulty  Performing light activities around your home: A little bit of difficulty  Performing heavy activities around your home: Quite a bit of difficulty  Getting into or out of a car: Moderate difficulty  Walking 2 blocks: Moderate difficulty  Walking a mile: Extreme difficulty or unable to perform activity  Going up or down 10 stairs (about 1 flight of stairs): Quite a bit of difficulty  Standing for 1 hour: A little bit of difficulty  Sitting for 1 hour: A little bit of difficulty  Running on even ground: Extreme difficulty or unable to perform  activity  Running on uneven ground: Extreme difficulty or unable to perform activity  Making sharp turns while running fast: Extreme difficulty or unable to perform activity  Hopping: Extreme difficulty or unable to perform activity  Rolling over in bed: A little bit of difficulty  Total: 32  Mini Best  Mini Best Score/Comments: 23/28  Timed Up and Go (TUG)  TUG Test 1: 9.71 seconds  TUG Test 2: 9.43 seconds  Timed Up and Go Comments: Admin with dual task from [employer] was 10.9 sec.      Time Calculation:   Start Time: 1100  Stop Time: 1145  Time Calculation (min): 45 min   Therapy Charges for Today       Code Description Service Date Service Provider Modifiers Qty    52621082391 HC PT THERAPEUTIC ACT EA 15 MIN 2/6/2024 Cleopatra Hoffmann, PT GP 3            PT G-Codes  Outcome Measure Options: Lower Extremity Functional Scale (LEFS), Mini-Best Test, 5x Sit to Stand, Timed Up and Go (TUG)  Total: 32  TUG Test 1: 9.71 seconds  TUG Test 2: 9.43 seconds         Cleopatra Hoffmann, PT  2/6/2024

## 2024-03-13 ENCOUNTER — HOSPITAL ENCOUNTER (OUTPATIENT)
Dept: OCCUPATIONAL THERAPY | Facility: HOSPITAL | Age: 39
Setting detail: THERAPIES SERIES
Discharge: HOME | End: 2024-03-13
Payer: COMMERCIAL

## 2024-03-13 ENCOUNTER — HOSPITAL ENCOUNTER (OUTPATIENT)
Dept: PHYSICAL THERAPY | Facility: HOSPITAL | Age: 39
Setting detail: THERAPIES SERIES
Discharge: HOME | End: 2024-03-13
Payer: COMMERCIAL

## 2024-03-13 DIAGNOSIS — G61.82 MULTIFOCAL MOTOR NEUROPATHY: Primary | ICD-10-CM

## 2024-03-13 DIAGNOSIS — G61.82 MMN (MULTIFOCAL MOTOR NEUROPATHY): ICD-10-CM

## 2024-03-13 DIAGNOSIS — G61.82 MMN (MULTIFOCAL MOTOR NEUROPATHY): Primary | ICD-10-CM

## 2024-03-13 PROCEDURE — 97168 OT RE-EVAL EST PLAN CARE: CPT

## 2024-03-13 PROCEDURE — 97530 THERAPEUTIC ACTIVITIES: CPT

## 2024-03-13 NOTE — THERAPY RE-EVALUATION
Outpatient Occupational Therapy Neuro Re-Evaluation   Robyn Karimi     Patient Name: Delphine King  : 1985  MRN: [id number]  Today's Date: 3/13/2024      Visit Date: 2024    Patient Active Problem List   Diagnosis    Rectovaginal fistula: s/p repair in     History of pulmonary embolism: lifelong anticoagulation    MMN (multifocal motor neuropathy)    Morbid (severe) obesity due to excess calories (*specify comorbidity)        Past Medical History:   Diagnosis Date    Anxiety     Asthma     Cholelithiasis     Colon polyp     Deep vein thrombosis     Depression     Fissure, anal 2017    Hyperlipidemia     Kidney stones     MMN (multifocal motor neuropathy)     Neuropathy     PE (pulmonary thromboembolism) 2017    Stroke         Past Surgical History:   Procedure Laterality Date    ANAL FISTULA REPAIR      multiple repairs     SECTION      CHOLECYSTECTOMY WITH INTRAOPERATIVE CHOLANGIOGRAM N/A 2023    Procedure: CHOLECYSTECTOMY LAPAROSCOPIC INTRAOPERATIVE CHOLANGIOGRAM;  Surgeon: Goldie Mandujano DO;  Location: [facility];  Service: General;  Laterality: N/A;    ENDOSCOPY N/A 2023    Procedure: ESOPHAGOGASTRODUODENOSCOPY WITH COLD BX'S;  Surgeon: Mitch Barclay MD;  Location: [facility] ENDOSCOPY;  Service: Gastroenterology;  Laterality: N/A;  pre: abdominal pain  post: gastritis    ERCP N/A 2023    Procedure: ENDOSCOPIC RETROGRADE CHOLANGIOPANCREATOGRAPHY WITH SPHINCTEROTOMY AND BALOON SWEEP;  Surgeon: Mitch Barclay MD;  Location: [facility] ENDOSCOPY;  Service: Gastroenterology;  Laterality: N/A;  pre: biliary obstruction, choledocholithiasis  post: choledocholithiasis    HYSTERECTOMY  2017    PORTACATH PLACEMENT Right     power port    TONSILLECTOMY  2003    WISDOM TOOTH EXTRACTION  2003         Visit Dx:      ICD-10-CM ICD-9-CM   1. Multifocal motor neuropathy  G61.82 357.89   2. MMN (multifocal motor neuropathy) (HCC): Receieved IVIG  G61.82 357.89            OT Neuro        Row Name 03/13/24 1100             Coordination    9-Hole Peg Left 15.34  -EN      9-Hole Peg Right 18.89  -EN                User Key  (r) = Recorded By, (t) = Taken By, (c) = Cosigned By      Initials Name Provider Type    Diana Grimes OTR Occupational Therapist                    Hand Therapy (Last 24 Hours)       Hand Eval       Row Name 03/13/24 1100              Strength Right    # Reps 3  -EN      Right Rung 2  -EN      Right  Test 1 26  -EN      Right  Test 2 35  -EN      Right  Test 3 32  -EN       Strength Average Right 31  -EN          Strength Left    # Reps 3  -EN      Left Rung 2  -EN      Left  Test 1 48  -EN      Left  Test 2 52  -EN      Left  Test 3 50  -EN       Strength Average Left 50  -EN         Therapy Education    Education Details continue with HEP. Issued coban to wrap pens and educated on use of slanted surface for improved handwriting success (binder). Encouraged to continue with daily ROM and light strengthening HEP.  -EN      Given HEP;Symptoms/condition management  -EN      Program Reinforced  -EN      How Provided Verbal  -EN      Provided to Patient  -EN      Level of Understanding Teach back education performed;Verbalized;Demonstrated  -EN                User Key  (r) = Recorded By, (t) = Taken By, (c) = Cosigned By      Initials Name Provider Type    Diana Grimes OTR Occupational Therapist                   OT Ortho       Row Name 03/13/24 1100             Subjective Comments    Subjective Comments Patient reports she started a new job this week and is very excited. 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  -EN         Subjective Pain    Subjective Pain Comment Patient reports she's feeling pretty good today. She states the only complaint is she's been tripping more the past week.  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 Patient able to extend wrist with effort, limited extension in digits 3, 4, and 5.  -EN         Left Upper Ext    Lt Shoulder Abduction AROM WFL  -EN      Lt Shoulder Flexion AROM WFL  -EN      Lt Elbow Extension/Flexion AROM WFL  -EN      Lt Elbow Supination AROM WFL  -EN      Lt Elbow Pronation AROM WFL  -EN      Lt Wrist Flexion AROM WFL  -EN      Lt Wrist Extension AROM WFL  -EN         MMT Right Upper Ext    Rt Shoulder Flexion MMT, Gross Movement (4/5) good  -EN      Rt Shoulder Extension MMT, Gross Movement (4+/5) good plus  -EN      Rt Shoulder ABduction MMT, Gross Movement (4/5) good  -EN      Rt Shoulder ADduction MMT, Gross Movement (4/5) good  -EN      Rt Elbow Flexion MMT, Gross Movement: (4+/5) good plus  -EN      Rt Elbow Extension MMT, Gross Movement: (4+/5) good plus  -EN      Rt Forearm Supination MMT, Gross Movement (4/5) good  -EN      Rt Forearm Pronation MMT, Gross Movement (4/5) good  -EN      Rt Wrist Flexion MMT, Gross Movement (3+/5) fair plus  -EN      Rt Wrist Extension MMT, Gross Movement (4-/5) good minus  -EN         MMT Left Upper Ext    Lt Shoulder Flexion MMT, Gross Movement (4+/5) good plus  -EN      Lt Shoulder Extension MMT, Gross Movement (5/5) normal  -EN      Lt Shoulder ABduction MMT, Gross Movement (4+/5) good plus  -EN      Lt Shoulder ADduction MMT, Gross Movement (5/5) normal  -EN      Lt Elbow Flexion MMT, Gross Movement (5/5) normal   -EN      Lt Elbow Extension MMT, Gross Movement (5/5) normal  -EN      Lt Forearm Supination MMT, Gross Movement (5/5) normal  -EN      Lt Forearm Pronation MMT, Gross Movement (5/5) normal  -EN      Lt Wrist Flexion MMT, Gross Movement (5/5) normal  -EN      Lt Wrist Extension MMT, Gross Movement (5/5) normal  -EN                User Key  (r) = Recorded By, (t) = Taken By, (c) = Cosigned By      Initials Name Provider Type    Diana Grimes OTR Occupational Therapist                      Therapy Education  Education Details: continue with HEP. Issued coban to wrap pens and educated on use of slanted surface for improved handwriting success (binder). Encouraged to continue with daily ROM and light strengthening HEP.  Given: HEP, Symptoms/condition management  Program: Reinforced  How Provided: Verbal  Provided to: Patient  Level of Understanding: Teach back education performed, Verbalized, Demonstrated     OT Goals       Row Name 03/13/24 1015          OT Short Term Goals    STG Date to Achieve 12/13/23  -EN     STG 1 Patient will demonstrate improved right  strength by 3# for improved grasp of objects. (Currently 25# pounds)  -EN     STG 1 Progress Met  -EN     STG 1 Progress Comments 31#  -EN     STG 2 Patient will report modified independence with ADL/IADL routine (compensatory strategies, adaptive equipment, etc..)  -EN     STG 2 Progress Met  -EN        Long Term Goals    LTG Date to Achieve 01/16/24  -EN     LTG 1 Patient will demonstrate improved fine motor coordination evidenced by a 5 second improved 9 hole peg score (right hand)  -EN     LTG 1 Progress Met  -EN     LTG 2 Patient will demonstrate improved right hand  strength by 20 pounds for improved grasp of objects.(30#)  -EN     LTG 2 Progress --  right  decreased this month  -EN     LTG 3 Patient will improve Quick Dash score 50 points.  -EN               User Key  (r) = Recorded By, (t) = Taken By, (c) = Cosigned By      Initials  Name Provider Type    Diana Grimes OTR Occupational Therapist                            OT Assessment/Plan       Row Name 03/13/24 0221          OT Assessment    Assessment Comments Patient reports she's feeling pretty good today and is walking without an assistive device. Patient reports she has noticed she's tripped more the past week however no other issues. Patient continues to demonstrate more weakness in the R UE compared to the L UE however no significant changes from previous months. Patient's right  has improved siginificantly since last visit on 2/6/24 (11.33 pounds to 31 pounds) and left  has no signicant changes (51 pounds last visit to 50 pounds today). Patient's 9 Hole peg scores improved in the right hand from 20.42 seconds to 18.89 seconds, no significant changes in the left hand (15.16 seconds to 15.34 seconds). There are no significant changes with right pinch compared to last visit (R Lateral pinch 6.3 pounds to 6 pounds, R three point pinch 6.67 pounds to 7.3 pounds. Left pinch did have improvements (L lateral pinch 12 pounds to 14.3 pounds, L three point pinceh 15.67 pounds to 17 pounds). Overall patient demonstrated significant improvement with right  strength and slight improvements with right hand coordination.  -EN     OT Diagnosis MMN, decreased B UE functional use, oscar R UE  -EN     OT Rehab Potential Good  -EN     Patient/caregiver participated in establishment of treatment plan and goals Yes  -EN        OT Plan    OT Plan Comments Continue with plan. Patient to return in 5 weeks (infusions now every 5 weeks)  -EN               User Key  (r) = Recorded By, (t) = Taken By, (c) = Cosigned By      Initials Name Provider Type    Diana Grimes OTR Occupational Therapist                        9 Hole Peg  9-Hole Peg Left: 15.34  9-Hole Peg Right: 18.89  Quick DASH  Open a tight or new jar.: Moderate Difficulty  Do heavy household chores (e.g., wash walls,  wash floors): Unable  Carry a shopping bag or briefcase: Mild Difficulty  Wash your back: Unable  Use a knife to cut food: Mild Difficulty  Recreational activities in which you take some force or impact through your arm, should or hand (e.g. golf, hammering, tennis, etc.): Unable  During the past week, to what extent has your arm, shoulder, or hand problem interfered with your normal social activites with family, friends, neighbors or groups?: Slightly  During the past week, were you limited in your work or other regular daily activities as a result of your arm, shoulder or hand problem?: Slightly Limited  Arm, Shoulder, or hand pain: None  Tingling (pins and needles) in your arm, shoulder, or hand: Mild  During the past week, how much difficulty have you had sleeping because of the pain in your arm, shoulder or hand?: No difficulty  Number of Questions Answered: 11  Quick DASH Score: 43.18         Time Calculation:   OT Start Time: 1019  OT Stop Time: 1047  OT Time Calculation (min): 28 min     Therapy Charges for Today       Code Description Service Date Service Provider Modifiers Qty    72658724815  OT RE-EVAL 2 3/13/2024 Diana Richmond OTR GO 1                       JESSY Medina  3/13/2024

## 2024-03-13 NOTE — THERAPY PROGRESS REPORT/RE-CERT
Outpatient Physical Therapy Neuro Progress Note   [place]     Patient Name: Delphine King  : 1985  MRN: [id number]  Today's Date: 3/13/2024      Visit Date: 2024    Visit Dx:    ICD-10-CM ICD-9-CM   1. MMN (multifocal motor neuropathy) ([facility]): Receieved IVIG  G61.82 357.89       Patient Active Problem List   Diagnosis    Rectovaginal fistula: s/p repair in 2016    History of pulmonary embolism: lifelong anticoagulation    MMN (multifocal motor neuropathy)    Morbid (severe) obesity due to excess calories (*specify comorbidity)                        PT Assessment/Plan       Row Name 24 1454          PT Assessment    Functional Limitations Decreased safety during functional activities;Impaired gait;Limitation in home management;Limitations in community activities;Performance in leisure activities;Performance in self-care ADL  -JV     Impairments Balance;Coordination;Endurance;Gait;Impaired muscle endurance;Motor function;Muscle strength  -JV     Assessment Comments The pt presents for tx and re-assessment with the LEFS, TUG, 5XSTS, and Mini-BESTest. Pt reports she is doing a trial of having IVIG infusion every 5 weeks instead of every 4 weeks. Next infusion is scheduled for 3/18-3/20/24. Pt reports feeling good this week, but is experiencing increased frequency of tripping. Pt also states she has started a new job with [employer]. Overall, pt is doing well and all of the objective test measures improved. LEFS score improved from 32 to 38 points, TUG avg improved from from 9.5 to 8.7 sec, 5XSTS improved from 25.42 to 22.04 sec, and Mini-BESTest score increased from 23 to 27/28. Seated hip ADD vs small ball was added to HEP.  -JV        PT Plan    PT Plan Comments Cont plan, assess as needed.  -JV               User Key  (r) = Recorded By, (t) = Taken By, (c) = Cosigned By      Initials Name Provider Type    Cleopatra Glynn, PT Physical Therapist                        OP Exercises       Row  Name 03/13/24 1100             Precautions    Existing Precautions/Restrictions fall  -JV         Subjective    Subjective Comments Pt reports she is doing a trial of having IVIG infusions every 5 weeks instead of every 4 weeks. Next infusion is scheduled for 3/18-3/20/24.  -JV         Exercise 1    Exercise Name 1 Pt was assessed with miniBEST, TUG, 5XSTS and LEFS.  -JV         Exercise 2    Exercise Name 2 Seated hip ADD with small ball  -JV      Cueing 2 Verbal;Demo  -JV      Sets 2 2  -JV      Reps 2 10  -JV                User Key  (r) = Recorded By, (t) = Taken By, (c) = Cosigned By      Initials Name Provider Type    Cleopatra Glynn PT Physical Therapist                                 PT OP Goals       Row Name 03/13/24 1100          PT Short Term Goals    STG Date to Achieve 09/08/23  -JV     STG 1 Pt will be I with HEP  -JV     STG 1 Progress Ongoing  -JV     STG 2 Admin DGI at next visit  -JV     STG 2 Progress Met  -JV        Long Term Goals    LTG Date to Achieve 04/12/24  -JV     LTG 1 Pt will amb up/down steps with reciprocal pattern and 1 HR or cane.  -JV     LTG 1 Progress Progressing  -JV     LTG 2 Pt will improve 5XSST time to < 16.5 sec.  -JV     LTG 3 Pt will increase Oj LE strength to 4/5  -JV     LTG 3 Progress Progressing  -JV        Time Calculation    PT Goal Re-Cert Due Date 04/12/24  -JV               User Key  (r) = Recorded By, (t) = Taken By, (c) = Cosigned By      Initials Name Provider Type    Cleopatra Glynn PT Physical Therapist                    Therapy Education  Education Details: Cont HEP, add seated hip ADD with small ball to LE exercises.  Given: HEP  Program: New, Reinforced  How Provided: Verbal, Demonstration  Provided to: Patient  Level of Understanding: Teach back education performed, Verbalized, Demonstrated    Outcome Measure Options: Lower Extremity Functional Scale (LEFS), Mini-Best Test, 5x Sit to Stand, Timed Up and Go (TUG)  5 Times Sit to Stand  5 Times  Sit to Stand (seconds): 22.04 seconds  5 Times Sit to Stand Comments: Previous time 25.42  Lower Extremity Functional Index  Any of your usual work, housework or school activities: A little bit of difficulty  Your usual hobbies, recreational or sporting activities: A little bit of difficulty  Getting into or out of the bath: A little bit of difficulty  Walking between rooms: A little bit of difficulty  Putting on your shoes or socks: A little bit of difficulty  Squatting: Quite a bit of difficulty  Lifting an object, like a bag of groceries from the floor: A little bit of difficulty  Performing light activities around your home: A little bit of difficulty  Performing heavy activities around your home: Quite a bit of difficulty  Getting into or out of a car: Moderate difficulty  Walking 2 blocks: Moderate difficulty  Walking a mile: Extreme difficulty or unable to perform activity  Going up or down 10 stairs (about 1 flight of stairs): Moderate difficulty  Standing for 1 hour: A little bit of difficulty  Sitting for 1 hour: A little bit of difficulty  Running on even ground: Extreme difficulty or unable to perform activity  Running on uneven ground: Extreme difficulty or unable to perform activity  Making sharp turns while running fast: Extreme difficulty or unable to perform activity  Hopping: Extreme difficulty or unable to perform activity  Rolling over in bed: A little bit of difficulty  Total: 38  Mini Best  Mini Best Score/Comments: 27/28  Timed Up and Go (TUG)  TUG Test 1: 8.28 seconds  TUG Test 2: 9.31 seconds  Timed Up and Go Comments: Admin with dual task from MiniBEST was 9.42 sec.      Time Calculation:   Start Time: 1100  Stop Time: 1145  Time Calculation (min): 45 min   Therapy Charges for Today       Code Description Service Date Service Provider Modifiers Qty    85505027947 HC PT THERAPEUTIC ACT EA 15 MIN 3/13/2024 Cleopatra Hoffmann, PT GP 3            PT G-Codes  Outcome Measure Options: Lower Extremity  Functional Scale (LEFS), Mini-Best Test, 5x Sit to Stand, Timed Up and Go (TUG)  Total: 38  TUG Test 1: 8.28 seconds  TUG Test 2: 9.31 seconds         Cleopatra Hoffmann, PT  3/13/2024

## 2024-04-10 ENCOUNTER — HOSPITAL ENCOUNTER (OUTPATIENT)
Dept: OCCUPATIONAL THERAPY | Facility: HOSPITAL | Age: 39
Setting detail: THERAPIES SERIES
Discharge: HOME | End: 2024-04-10
Payer: COMMERCIAL

## 2024-04-10 ENCOUNTER — HOSPITAL ENCOUNTER (OUTPATIENT)
Dept: PHYSICAL THERAPY | Facility: HOSPITAL | Age: 39
Setting detail: THERAPIES SERIES
Discharge: HOME | End: 2024-04-10
Payer: COMMERCIAL

## 2024-04-10 DIAGNOSIS — G61.82 MMN (MULTIFOCAL MOTOR NEUROPATHY): Primary | ICD-10-CM

## 2024-04-10 DIAGNOSIS — G61.82 MMN (MULTIFOCAL MOTOR NEUROPATHY): ICD-10-CM

## 2024-04-10 DIAGNOSIS — G61.82 MULTIFOCAL MOTOR NEUROPATHY: Primary | ICD-10-CM

## 2024-04-10 PROCEDURE — 97530 THERAPEUTIC ACTIVITIES: CPT

## 2024-04-10 PROCEDURE — 97168 OT RE-EVAL EST PLAN CARE: CPT

## 2024-04-10 NOTE — THERAPY RE-EVALUATION
Outpatient Occupational Therapy Neuro Re-Evaluation   Robyn Karimi     Patient Name: Delphine King  : 1985  MRN: [id number]  Today's Date: 4/10/2024      Visit Date: 04/10/2024    Patient Active Problem List   Diagnosis    Rectovaginal fistula: s/p repair in     History of pulmonary embolism: lifelong anticoagulation    MMN (multifocal motor neuropathy)    Morbid (severe) obesity due to excess calories (*specify comorbidity)        Past Medical History:   Diagnosis Date    Anxiety     Asthma     Cholelithiasis     Colon polyp     Deep vein thrombosis     Depression     Fissure, anal 2017    Hyperlipidemia     Kidney stones     MMN (multifocal motor neuropathy)     Neuropathy     PE (pulmonary thromboembolism) 2017    Stroke         Past Surgical History:   Procedure Laterality Date    ANAL FISTULA REPAIR      multiple repairs     SECTION      CHOLECYSTECTOMY WITH INTRAOPERATIVE CHOLANGIOGRAM N/A 2023    Procedure: CHOLECYSTECTOMY LAPAROSCOPIC INTRAOPERATIVE CHOLANGIOGRAM;  Surgeon: Goldie Mandujano DO;  Location: [facility];  Service: General;  Laterality: N/A;    ENDOSCOPY N/A 2023    Procedure: ESOPHAGOGASTRODUODENOSCOPY WITH COLD BX'S;  Surgeon: Mitch Barclay MD;  Location: [facility] ENDOSCOPY;  Service: Gastroenterology;  Laterality: N/A;  pre: abdominal pain  post: gastritis    ERCP N/A 2023    Procedure: ENDOSCOPIC RETROGRADE CHOLANGIOPANCREATOGRAPHY WITH SPHINCTEROTOMY AND BALOON SWEEP;  Surgeon: Mitch Barclay MD;  Location: [facility] ENDOSCOPY;  Service: Gastroenterology;  Laterality: N/A;  pre: biliary obstruction, choledocholithiasis  post: choledocholithiasis    HYSTERECTOMY  2017    PORTACATH PLACEMENT Right     power port    TONSILLECTOMY  2003    WISDOM TOOTH EXTRACTION  2003         Visit Dx:    No diagnosis found.         OT Neuro       Row Name 04/10/24 1300             Subjective Comments    Subjective Comments Patient reports she has had a decline since  "the last part of last week. Patient states she has had a lot of R UE pain with spasms and \"cecilia horses\". Patient states she's had to use her cane since last Friday. Patient reports she feels she will have to go back to infusions every 4 weeks due to decline.  -EN         Subjective Pain    Subjective Pain Comment Patient reports no pain at rest however has reached 7/10 in the R UE /hand since last week.  -EN         Sensation    Additional Comments reports spasms/\"cecilia horses in the R UE, especially hand. Reports difficulty typing at work due to spasms.  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 Patient able to fully extend with effort but has limited finger extension in digits 3,4, and .5  -EN         Left Upper Ext    Lt Shoulder Abduction AROM WFL  -EN      Lt Shoulder Flexion AROM WFL  -EN      Lt Elbow Extension/Flexion AROM WFL  -EN      Lt Elbow Supination AROM WFL  -EN      Lt Elbow Pronation AROM WFL  -EN      Lt Wrist Flexion AROM WFL  -EN      Lt Wrist Extension AROM WFL  -EN         MMT Right Upper Ext    Rt Shoulder Flexion MMT, Gross Movement (4-/5) good minus  -EN      Rt Shoulder Extension MMT, Gross Movement (4-/5) good minus  -EN      Rt Shoulder ABduction MMT, Gross Movement (4-/5) good minus  -EN      Rt Shoulder ADduction MMT, Gross Movement (4-/5) good minus  -EN      Rt Elbow Flexion MMT, Gross Movement: (4/5) good  -EN      Rt Elbow Extension MMT, Gross Movement: (4/5) good  -EN      Rt Forearm Supination MMT, Gross Movement (4-/5) good minus  -EN      Rt Forearm Pronation MMT, Gross Movement (4-/5) good minus  -EN      Rt Wrist " Flexion MMT, Gross Movement (3/5) fair  -EN      Rt Wrist Extension MMT, Gross Movement (3/5) fair  -EN         MMT Left Upper Ext    Lt Shoulder Flexion MMT, Gross Movement (5/5) normal  -EN      Lt Shoulder Extension MMT, Gross Movement (5/5) normal  -EN      Lt Shoulder ABduction MMT, Gross Movement (4+/5) good plus  -EN      Lt Shoulder ADduction MMT, Gross Movement (5/5) normal  -EN      Lt Elbow Flexion MMT, Gross Movement (5/5) normal  -EN      Lt Elbow Extension MMT, Gross Movement (5/5) normal  -EN      Lt Forearm Supination MMT, Gross Movement (5/5) normal  -EN      Lt Forearm Pronation MMT, Gross Movement (5/5) normal  -EN      Lt Wrist Flexion MMT, Gross Movement (5/5) normal  -EN      Lt Wrist Extension MMT, Gross Movement (5/5) normal  -EN         Functional Mobility    Functional Mobility- Comment Patient walking with cane, reports she had to start using the cane last Friday.  -EN                User Key  (r) = Recorded By, (t) = Taken By, (c) = Cosigned By      Initials Name Provider Type    Diana Grimes OTR Occupational Therapist                    Hand Therapy (Last 24 Hours)       Hand Eval       Row Name 04/10/24 1300              Strength Right    # Reps 3  -EN      Right Rung 2  -EN      Right  Test 1 10  -EN      Right  Test 2 10  -EN      Right  Test 3 12  -EN       Strength Average Right 10.67  -EN          Strength Left    # Reps 3  -EN      Left Rung 2  -EN      Left  Test 1 49  -EN      Left  Test 2 30  -EN      Left  Test 3 50  -EN       Strength Average Left 43  -EN         Right Hand Strength - Pinch (lbs)    Lateral 6.7 lbs  8,6, 6  -EN      Tip (3 point) 5.3 lbs  6,5,5  -EN         Left Hand Strength - Pinch (lbs)    Lateral 11.7 lbs  12,11,12  -EN      Tip (3 point) 15.3 lbs  15,16,15  -EN         Therapy Education    Education Details Continue HEP, elevated keyboard for typing (reports she tried to wear wrist cock up typing  however it was too bulky and caused typing errors)  -EN      Given Symptoms/condition management;HEP  -EN      Program Reinforced  -EN      How Provided Verbal;Demonstration  -EN      Provided to Patient  -EN      Level of Understanding Teach back education performed;Demonstrated;Verbalized  -EN                User Key  (r) = Recorded By, (t) = Taken By, (c) = Cosigned By      Initials Name Provider Type    Diana Grimes, OTR Occupational Therapist                        Therapy Education  Education Details: Continue HEP, elevated keyboard for typing (reports she tried to wear wrist cock up typing however it was too bulky and caused typing errors)  Given: Symptoms/condition management, HEP  Program: Reinforced  How Provided: Verbal, Demonstration  Provided to: Patient  Level of Understanding: Teach back education performed, Demonstrated, Verbalized       04/10/24 1400   OT Short Term Goals   STG Date to Achieve 12/13/23   STG 1 Patient will demonstrate improved right  strength by 3# for improved grasp of objects. (Currently 25# pounds)   STG 1 Progress Met   STG 2 Patient will report modified independence with ADL/IADL routine (compensatory strategies, adaptive equipment, etc..)   STG 2 Progress Met   Long Term Goals   LTG Date to Achieve 05/29/24   LTG 1 Patient will demonstrate improved fine motor coordination evidenced by a 5 second improved 9 hole peg score (right hand)   LTG 1 Progress Met   LTG 2 Patient will demonstrate improved right hand  strength by 20 pounds for improved grasp of objects.(30#)   LTG 2 Progress Ongoing  (right  decreased this month significantly)   LTG 3 Patient will improve Quick Dash score 50 points.   LTG 3 Progress Ongoing                 OT Assessment/Plan       Row Name 04/10/24 7508          OT Assessment    Functional Limitations Limitation in home management;Limitations in community activities;Performance in leisure activities;Performance in self-care ADL   "-EN     Impairments Coordination;Dexterity;Muscle strength;Pain;Range of motion  -EN     Assessment Comments Patient reports she's noticed a significant decline since the end of last week. Patient reports pain and spasms in the R UE started last Friday and she has been \"having a lot of cecilia horses in my right arm\". Patient reports she has also required use of her cane since last Friday. Patient demonstrated decreased strength and coordination in the R UE as follows: Right  decreased since last visit from 31 pounds to 10.7 pounds, Left  decreased from 50 pounds to 43 pounds. R lateral pinch was the only improvement since last month and increased from 6 pounds to 6.7 pounds. Left lateral pinch decreased from 14.3 pounds to 11.7 pounds. 3 point pinch decreased in the right hand from 7.3 pounds to 6.7 pounds and L three point pinch decreased from 17 pounds to 15.3 pounds. Patient also demonstrated decreased right shoulder, elbow, forearm and wrist strength but no significant changes in the L UE shoulder, elbow, forearm or wrist. 9 hole peg times in the right hand increased from 18.89 seconds to 23.94 seconds and in the left hand increased from 15.34 seconds to 17.42 seconds. Patient reports she had to decrease her work hours significantly this week due to pain and spasms and hopes she can go back to infusions every 4 weeks. Patient educated on compensatory strategies for deficits and reinforced HEP.  -EN     Please refer to paper survey for additional self-reported information No  -EN     OT Diagnosis MMN, decreased B UE functional use, especially R UE  -EN     OT Rehab Potential Good  -EN        OT Plan    OT Frequency --  reassessment every 4 weeks  -EN     Predicted Duration of Therapy Intervention (OT) 6 months  -EN     Planned CPT's? OT EVAL LOW COMPLEXITY: 85175  -EN     Planned Therapy Interventions (Optional Details) home exercise program;patient/family education;strengthening  -EN     OT Plan " Comments Continue with plan of care.  -EN               User Key  (r) = Recorded By, (t) = Taken By, (c) = Cosigned By      Initials Name Provider Type    Diana Grimes OTR Occupational Therapist                        9 Hole Peg  9-Hole Peg Left: 17.42  9-Hole Peg Right: 23.94  Quick DASH  Open a tight or new jar.: Severe Difficulty  Do heavy household chores (e.g., wash walls, wash floors): Severe Difficulty  Carry a shopping bag or briefcase: Moderate Difficulty  Wash your back: Unable  Use a knife to cut food: Severe Difficulty  Recreational activities in which you take some force or impact through your arm, should or hand (e.g. golf, hammering, tennis, etc.): Unable  During the past week, to what extent has your arm, shoulder, or hand problem interfered with your normal social activites with family, friends, neighbors or groups?: Quite a bit  During the past week, were you limited in your work or other regular daily activities as a result of your arm, shoulder or hand problem?: Very limited  Arm, Shoulder, or hand pain: Extreme  Tingling (pins and needles) in your arm, shoulder, or hand: Severe  During the past week, how much difficulty have you had sleeping because of the pain in your arm, shoulder or hand?: Mild Difficulty  Number of Questions Answered: 11  Quick DASH Score: 75  Work Module (Optional)  Using your usual technique for your work?: Severe Difficulty  Doing your usual work because of arm, shoulder or hand pain?: Severe Difficulty  Doing your work as well as you would like?: Severe Difficulty  Spending your usual amount of time doing your work?: Severe Difficulty  Work Module Score: 75         Time Calculation:   OT Start Time: 1300  OT Stop Time: 1330  OT Time Calculation (min): 30 min     Therapy Charges for Today       Code Description Service Date Service Provider Modifiers Qty    53349530971  OT RE-EVAL 2 4/10/2024 Diana Richmond OTR GO 1                       Diana BROOKS  Yi, OTR  4/10/2024

## 2024-04-10 NOTE — THERAPY PROGRESS REPORT/RE-CERT
Outpatient Physical Therapy Neuro Progress Note   [place]     Patient Name: Delphine King  : 1985  MRN: [id number]  Today's Date: 4/10/2024      Visit Date: 04/10/2024    Visit Dx:    ICD-10-CM ICD-9-CM   1. MMN (multifocal motor neuropathy) ([facility]): Receieved IVIG  G61.82 357.89       Patient Active Problem List   Diagnosis    Rectovaginal fistula: s/p repair in 2016    History of pulmonary embolism: lifelong anticoagulation    MMN (multifocal motor neuropathy)    Morbid (severe) obesity due to excess calories (*specify comorbidity)                        PT Assessment/Plan       Row Name 04/10/24 1632          PT Assessment    Functional Limitations Decreased safety during functional activities;Impaired gait;Limitation in home management;Limitations in community activities;Performance in leisure activities;Performance in self-care ADL  -JV     Impairments Balance;Coordination;Endurance;Gait;Impaired muscle endurance;Motor function;Muscle strength  -JV     Assessment Comments The pt presents for tx and re-assessment this date, reports increased difficulty with mobility and ADL's since IVIG infusion has been moved to 5 week intervals. Pt's last infusion was 3/18-3/20/24 and next one is scheduled for -24. Pt notes residual weakness and fatigue after recent 5 week interval treatment schedule, from which she did not recover fully. The pt has experienced 1 fall since last assessment, is unable to walk up steps without assist, and is no longer able to stand up from a chair without using arms. The pt was assessed using the LEFS, TUG, 5XSTS, MILLER, and DGI. All objective measures worsened when compared to previous assessments. The LEFS went down from 38 to 22 points, TUG time increased from 8.79 to 15.77 sec, 5XSTS increased from 22.04 to 34.18 and pt had to use UE's to assist with sit-stand from chair, MILLER score decreased from 56 to 54 points, and the DGI decreased from 14 to 13 points.  -JV         PT Plan    PT Plan Comments Cont plan, assess as needed.  -JV               User Key  (r) = Recorded By, (t) = Taken By, (c) = Cosigned By      Initials Name Provider Type    Cleopatra Glynn PT Physical Therapist                        OP Exercises       Row Name 04/10/24 1400             Precautions    Existing Precautions/Restrictions fall  -JV         Subjective    Subjective Comments Pt reports she is having a hard time since trial of IVIG every 5 weeks. Pt is unable to go up steps without assist and has experienced 1 fall since last assessment.  -JV         Exercise 1    Exercise Name 1 Pt was assessed with the MILLER, DGI, TUG, 5XSTS and LEFS.  -JV                User Key  (r) = Recorded By, (t) = Taken By, (c) = Cosigned By      Initials Name Provider Type    Cleopatra Glynn PT Physical Therapist                                    Therapy Education  Education Details: Cont HEP as vishal, discuss Swedish Knee Cage brace for L LE at next visit with Neuro.  Given: Symptoms/condition management, HEP  Program: Reinforced  How Provided: Verbal  Provided to: Patient  Level of Understanding: Teach back education performed, Verbalized    Outcome Measure Options: Lower Extremity Functional Scale (LEFS), 5x Sit to Stand, Timed Up and Go (TUG), Dynamic Gait Index  5 Times Sit to Stand  5 Times Sit to Stand (seconds): 34.18 seconds  5 Times Sit to Stand Comments: Previous time 22.04 without use of hands.  Miller Balance Scale  Sitting to Standing: able to stand independently using hands  Standing Unsupported: able to stand safely for 2 minutes  Sitting with Back Unsupported but Feet Supported on Floor or on Stool: able to sit safely and securely for 2 minutes  Standing to Sitting: sits safely with minimal use of hands  Transfers: able to transfer safely definite need of hands  Standing Unsupported with Eyes Closed: able to stand 10 seconds safely  Standing Unsupported with Feet Together: able to place feet together  independently and stand 1 minute safely  Reaching Forward with Outstretched Arm While Standing: can reach forward confidently 25 cm (10 inches)   Object From the Floor From a Standing Position: able to  object safely and easily  Turning to Look Behind Over Left and Right Shoulders While Standing: looks behind from both sides and weight shifts well  Turn 360 Degrees: able to turn 360 degrees safely in 4 seconds or less  Place Alternate Foot on Step or Stool While Standing Unsupported: able to stand independently and safely and complete 8 steps in 20 seconds  Standing Unsupported with One Foot in Front: able to place foot tandem independently and hold 30 seconds  Standing on One Leg: able to lift leg independently and hold > 10 seconds  Neal Total Score: 54  Neal Comments: Previous score 56  Dynamic Gait Index (DGI)  Gait Level Surface: Moderate impairment: walks 20’, slow speed, abnormal gait patters, evidence for imbalance  Change in Gait Speed: Moderate impairment: Makes only minor adjustments to walking speed, or accomplishes a change in speed with significant gait deviations, or changes speed but loses balance but is able to recover and continue walking.  Gait with Horizontal Head Turns: Mild impairment: Performs head turns smoothly with slight change in gait velocity, i.e. minor disruption to smooth gait path or uses walking aid.  Gait with Vertical Head Turns: Mild impairment: Performs head turns smoothly with slight change in gait velocity, i.e. minor disruption to smooth gait path or uses walking aid.  Gait and Pivot Turn: Mild impairment: pivot turns safely in >3 seconds and stops with no loss of balance.  Step Over Obstacle: Mild impairment: Is able to step over shoe box, but must slow down and adjust steps to clear box safely.  Step Around Obstacles: Mild impairment: Is able to step around both cones, but must slow down and adjust steps to clear cones.  Steps: Moderate impairment: Two feet to  a stair, must use rail.  Dynamic Gait Index Score: 13  Dynamic Gait Index Comments: Previous score 14  Lower Extremity Functional Index  Any of your usual work, housework or school activities: Quite a bit of difficulty  Your usual hobbies, recreational or sporting activities: Moderate difficulty  Getting into or out of the bath: Moderate difficulty  Walking between rooms: A little bit of difficulty  Putting on your shoes or socks: A little bit of difficulty  Squatting: Extreme difficulty or unable to perform activity  Lifting an object, like a bag of groceries from the floor: Quite a bit of difficulty  Performing light activities around your home: Moderate difficulty  Performing heavy activities around your home: Extreme difficulty or unable to perform activity  Getting into or out of a car: Quite a bit of difficulty  Walking 2 blocks: Quite a bit of difficulty  Walking a mile: Extreme difficulty or unable to perform activity  Going up or down 10 stairs (about 1 flight of stairs): Quite a bit of difficulty  Standing for 1 hour: Moderate difficulty  Sitting for 1 hour: Moderate difficulty  Running on even ground: Extreme difficulty or unable to perform activity  Running on uneven ground: Extreme difficulty or unable to perform activity  Making sharp turns while running fast: Extreme difficulty or unable to perform activity  Hopping: Extreme difficulty or unable to perform activity  Rolling over in bed: Quite a bit of difficulty  Total: 22  Timed Up and Go (TUG)  TUG Test 1: 15.77 seconds  TUG Test 2: 19.7 seconds  Timed Up and Go Comments: Second attempt with dual task of counting backward by 3, previous avg was 8.79 sec.      Time Calculation:   Start Time: 1400  Stop Time: 1500  Time Calculation (min): 60 min   Therapy Charges for Today       Code Description Service Date Service Provider Modifiers Qty    12073118372 HC PT THERAPEUTIC ACT EA 15 MIN 4/10/2024 Cleopatra Hoffmann, PT GP 4            PT G-Codes  Outcome  Measure Options: Lower Extremity Functional Scale (LEFS), 5x Sit to Stand, Timed Up and Go (TUG), Dynamic Gait Index  Neal Total Score: 54  Total: 22  TUG Test 1: 15.77 seconds  TUG Test 2: 19.7 seconds         Cleopatra Hoffmann, PT  4/10/2024

## 2024-05-21 ENCOUNTER — APPOINTMENT (OUTPATIENT)
Dept: PHYSICAL THERAPY | Facility: HOSPITAL | Age: 39
End: 2024-05-21
Payer: COMMERCIAL

## 2024-05-21 ENCOUNTER — APPOINTMENT (OUTPATIENT)
Dept: OCCUPATIONAL THERAPY | Facility: HOSPITAL | Age: 39
End: 2024-05-21
Payer: COMMERCIAL

## 2024-06-11 ENCOUNTER — HOSPITAL ENCOUNTER (OUTPATIENT)
Dept: OCCUPATIONAL THERAPY | Facility: HOSPITAL | Age: 39
Setting detail: THERAPIES SERIES
Discharge: HOME | End: 2024-06-11
Payer: COMMERCIAL

## 2024-06-11 ENCOUNTER — HOSPITAL ENCOUNTER (OUTPATIENT)
Dept: PHYSICAL THERAPY | Facility: HOSPITAL | Age: 39
Setting detail: THERAPIES SERIES
Discharge: HOME | End: 2024-06-11
Payer: COMMERCIAL

## 2024-06-11 DIAGNOSIS — G61.82 MULTIFOCAL MOTOR NEUROPATHY: Primary | ICD-10-CM

## 2024-06-11 DIAGNOSIS — G61.82 MMN (MULTIFOCAL MOTOR NEUROPATHY): ICD-10-CM

## 2024-06-11 DIAGNOSIS — G61.82 MMN (MULTIFOCAL MOTOR NEUROPATHY): Primary | ICD-10-CM

## 2024-06-11 PROCEDURE — 97530 THERAPEUTIC ACTIVITIES: CPT

## 2024-06-11 PROCEDURE — 97168 OT RE-EVAL EST PLAN CARE: CPT

## 2024-06-11 NOTE — THERAPY PROGRESS REPORT/RE-CERT
Outpatient Physical Therapy Neuro Progress Note   [place]     Patient Name: Delphine King  : 1985  MRN: [id number]  Today's Date: 2024      Visit Date: 2024    Visit Dx:    ICD-10-CM ICD-9-CM   1. MMN (multifocal motor neuropathy) ([facility]): Receieved IVIG  G61.82 357.89       Patient Active Problem List   Diagnosis    Rectovaginal fistula: s/p repair in 2016    History of pulmonary embolism: lifelong anticoagulation    MMN (multifocal motor neuropathy)    Morbid (severe) obesity due to excess calories (*specify comorbidity)                        PT Assessment/Plan       Row Name 24 1159          PT Assessment    Functional Limitations Decreased safety during functional activities;Impaired gait;Limitation in home management;Limitations in community activities;Performance in leisure activities;Performance in self-care ADL  -JV     Impairments Balance;Coordination;Endurance;Gait;Impaired muscle endurance;Motor function;Muscle strength  -JV     Assessment Comments The pt presents for tx and re-assessment, reports she has resumed 4 week schedule for IVIG infusions. Next scheduled infusion is 24, pt returns to Neuro in August. The pt was assessed using the LEFS, TUG, 5XSTS and DGI. Overall, the pt has improved, but she does not feel like she has returned to baseline. LEFS score increased from 22 to 30 points, TUG time decreased from 15.77 to 10.26 sec, 5XSTS time decreased from 34.18 to 19.54 sec, and the DGI increased from 13 to 14 based on improved amb on stairs. STG #1 is ongoing for changes to HEP, STG #2 has been met. LTG #1 has been met, and LTG's #2 and 3 are progressing.  -JV        PT Plan    PT Plan Comments Cont plan, assess as needed.  -JV               User Key  (r) = Recorded By, (t) = Taken By, (c) = Cosigned By      Initials Name Provider Type    Cleopatra Glynn, PT Physical Therapist                        OP Exercises       Row Name 24 0900              Precautions    Existing Precautions/Restrictions fall  -JV         Subjective    Subjective Comments Pt reports she has resumed IVIG infusions every 4 weeks, but is not back to baseline.  -JV         Exercise 1    Exercise Name 1 Pt was assessed with the DGI, TUG, 5XSTS and LEFS.  -JV                User Key  (r) = Recorded By, (t) = Taken By, (c) = Cosigned By      Initials Name Provider Type    Cleopatra Glynn, SARAH Physical Therapist                                 PT OP Goals       Row Name 06/11/24 1000          PT Short Term Goals    STG Date to Achieve 09/08/23  -JV     STG 1 Pt will be I with HEP  -JV     STG 1 Progress Ongoing  -JV     STG 2 Admin DGI at next visit  -JV     STG 2 Progress Met  -JV        Long Term Goals    LTG Date to Achieve 07/11/24  -JV     LTG 1 Pt will amb up/down steps with reciprocal pattern and 1 HR or cane.  -JV     LTG 1 Progress Met  -JV     LTG 2 Pt will improve 5XSST time to < 16.5 sec.  -JV     LTG 2 Progress Progressing  -JV     LTG 3 Pt will increase Oj LE strength to 4/5  -JV     LTG 3 Progress Progressing  -JV        Time Calculation    PT Goal Re-Cert Due Date 07/11/24  -JV               User Key  (r) = Recorded By, (t) = Taken By, (c) = Cosigned By      Initials Name Provider Type    Cleopatra Glynn, SARAH Physical Therapist                    Therapy Education  Education Details: Cont HEP  Given: HEP  Program: Reinforced  How Provided: Verbal  Provided to: Patient  Level of Understanding: Teach back education performed, Verbalized    Outcome Measure Options: Lower Extremity Functional Scale (LEFS), 5x Sit to Stand, Timed Up and Go (TUG), Dynamic Gait Index  5 Times Sit to Stand  5 Times Sit to Stand (seconds): 19.54 seconds  5 Times Sit to Stand Comments: Previous time 34.18  Dynamic Gait Index (DGI)  Gait Level Surface: Moderate impairment: walks 20’, slow speed, abnormal gait patters, evidence for imbalance  Change in Gait Speed: Moderate impairment: Makes only minor  adjustments to walking speed, or accomplishes a change in speed with significant gait deviations, or changes speed but loses balance but is able to recover and continue walking.  Gait with Horizontal Head Turns: Mild impairment: Performs head turns smoothly with slight change in gait velocity, i.e. minor disruption to smooth gait path or uses walking aid.  Gait with Vertical Head Turns: Mild impairment: Performs head turns smoothly with slight change in gait velocity, i.e. minor disruption to smooth gait path or uses walking aid.  Gait and Pivot Turn: Mild impairment: pivot turns safely in >3 seconds and stops with no loss of balance.  Step Over Obstacle: Mild impairment: Is able to step over shoe box, but must slow down and adjust steps to clear box safely.  Step Around Obstacles: Mild impairment: Is able to step around both cones, but must slow down and adjust steps to clear cones.  Steps: Mild impairment: Alternating feet, must use rail.  Dynamic Gait Index Score: 14  Dynamic Gait Index Comments: Previous score 13  Lower Extremity Functional Index  Any of your usual work, housework or school activities: Moderate difficulty  Your usual hobbies, recreational or sporting activities: Moderate difficulty  Getting into or out of the bath: A little bit of difficulty  Walking between rooms: A little bit of difficulty  Putting on your shoes or socks: A little bit of difficulty  Squatting: Extreme difficulty or unable to perform activity  Lifting an object, like a bag of groceries from the floor: Moderate difficulty  Performing light activities around your home: A little bit of difficulty  Performing heavy activities around your home: Quite a bit of difficulty  Getting into or out of a car: Moderate difficulty  Walking 2 blocks: Quite a bit of difficulty  Walking a mile: Extreme difficulty or unable to perform activity  Going up or down 10 stairs (about 1 flight of stairs): Quite a bit of difficulty  Standing for 1 hour:  Moderate difficulty  Sitting for 1 hour: Moderate difficulty  Running on even ground: Extreme difficulty or unable to perform activity  Running on uneven ground: Extreme difficulty or unable to perform activity  Making sharp turns while running fast: Extreme difficulty or unable to perform activity  Hopping: Extreme difficulty or unable to perform activity  Rolling over in bed: A little bit of difficulty  Total: 30  Timed Up and Go (TUG)  TUG Test 1: 10.26 seconds  TUG Test 2: 11.27 seconds  Timed Up and Go Comments: Second attempt with dual task of counting backward by 3      Time Calculation:   Start Time: 0900  Stop Time: 0945  Time Calculation (min): 45 min   Therapy Charges for Today       Code Description Service Date Service Provider Modifiers Qty    42482888198 HC PT THERAPEUTIC ACT EA 15 MIN 6/11/2024 Cleopatra Hoffmann, PT GP 3            PT G-Codes  Outcome Measure Options: Lower Extremity Functional Scale (LEFS), 5x Sit to Stand, Timed Up and Go (TUG), Dynamic Gait Index  Total: 30  TUG Test 1: 10.26 seconds  TUG Test 2: 11.27 seconds         Cleopatra Hoffmann, PT  6/11/2024

## 2024-06-11 NOTE — THERAPY RE-EVALUATION
Outpatient Occupational Therapy Neuro Re-Evaluation   [facility]     Patient Name: Delphine King  : 1985  MRN: [id number]  Today's Date: 2024      Visit Date: 2024    Patient Active Problem List   Diagnosis    Rectovaginal fistula: s/p repair in     History of pulmonary embolism: lifelong anticoagulation    MMN (multifocal motor neuropathy)    Morbid (severe) obesity due to excess calories (*specify comorbidity)        Past Medical History:   Diagnosis Date    Anxiety     Asthma     Cholelithiasis     Colon polyp     Deep vein thrombosis     Depression     Fissure, anal 2017    Hyperlipidemia     Kidney stones     MMN (multifocal motor neuropathy)     Neuropathy     PE (pulmonary thromboembolism) 2017    Stroke         Past Surgical History:   Procedure Laterality Date    ANAL FISTULA REPAIR      multiple repairs     SECTION      CHOLECYSTECTOMY WITH INTRAOPERATIVE CHOLANGIOGRAM N/A 2023    Procedure: CHOLECYSTECTOMY LAPAROSCOPIC INTRAOPERATIVE CHOLANGIOGRAM;  Surgeon: Goldie Mandujano DO;  Location: [facility];  Service: General;  Laterality: N/A;    ENDOSCOPY N/A 2023    Procedure: ESOPHAGOGASTRODUODENOSCOPY WITH COLD BX'S;  Surgeon: Mitch Barclay MD;  Location: [facility] ENDOSCOPY;  Service: Gastroenterology;  Laterality: N/A;  pre: abdominal pain  post: gastritis    ERCP N/A 2023    Procedure: ENDOSCOPIC RETROGRADE CHOLANGIOPANCREATOGRAPHY WITH SPHINCTEROTOMY AND BALOON SWEEP;  Surgeon: Mitch Barclay MD;  Location: [facility] ENDOSCOPY;  Service: Gastroenterology;  Laterality: N/A;  pre: biliary obstruction, choledocholithiasis  post: choledocholithiasis    HYSTERECTOMY  2017    PORTACATH PLACEMENT Right     power port    TONSILLECTOMY  2003    WISDOM TOOTH EXTRACTION  2003         Visit Dx:      ICD-10-CM ICD-9-CM   1. Multifocal motor neuropathy  G61.82 357.89   2. MMN (multifocal motor neuropathy) ([facility]): Receieved IVIG  G61.82 357.89            OT Neuro   "     Row Name 06/11/24 1000             Subjective Comments    Subjective Comments Patient reports she switched back to IVIG infusions every 4 weeks and reports she is doing better. Patient did report she is tired today and states she just got back from a 10 day vacation and it was a very long drive.  -EN         Subjective Pain    Subjective Pain Comment Patient reports no pain today, just fatigue. Reports she has an infusion next week (6/17/24)  -EN         Sensation    Additional Comments Report no \"cecilia horses\" or spasms lately.  -EN         Posture/Observations    Posture/Observations Comments Patient walking today without a cane.  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 Unable to fully extend digit 3, 4, and 5.  -EN      Rt Upper Extremity Comments  Reports wrist weakness has interfered with typing at work and \"I have to hunt and parry with my fingers.\"  -EN         Left Upper Ext    Lt Shoulder Abduction AROM WFL  -EN      Lt Shoulder Flexion AROM WFL  -EN      Lt Elbow Extension/Flexion AROM WFL  -EN      Lt Elbow Supination AROM WFL  -EN      Lt Elbow Pronation AROM WFL  -EN      Lt Wrist Flexion AROM WFL  -EN      Lt Wrist Extension AROM WFL  -EN         MMT Right Upper Ext    Rt Shoulder Flexion MMT, Gross Movement (4/5) good  -EN      Rt Shoulder Extension MMT, Gross Movement (4+/5) good plus  -EN      Rt Shoulder ABduction MMT, Gross Movement (4/5) good  -EN      Rt Shoulder ADduction MMT, Gross Movement (4+/5) good plus  -EN      Rt Elbow Flexion MMT, Gross Movement: (4+/5) good plus  -EN      Rt Elbow Extension MMT, Gross Movement: (4/5) good  -EN "      Rt Forearm Supination MMT, Gross Movement (4/5) good  -EN      Rt Forearm Pronation MMT, Gross Movement (4-/5) good minus  -EN      Rt Wrist Flexion MMT, Gross Movement (3+/5) fair plus  -EN      Rt Wrist Extension MMT, Gross Movement (3/5) fair  -EN         MMT Left Upper Ext    Lt Shoulder Flexion MMT, Gross Movement (5/5) normal  -EN      Lt Shoulder Extension MMT, Gross Movement (5/5) normal  -EN      Lt Shoulder ABduction MMT, Gross Movement (5/5) normal  -EN      Lt Shoulder ADduction MMT, Gross Movement (5/5) normal  -EN      Lt Elbow Flexion MMT, Gross Movement (5/5) normal  -EN      Lt Elbow Extension MMT, Gross Movement (5/5) normal  -EN      Lt Forearm Supination MMT, Gross Movement (5/5) normal  -EN      Lt Forearm Pronation MMT, Gross Movement (5/5) normal  -EN      Lt Wrist Flexion MMT, Gross Movement (5/5) normal  -EN      Lt Wrist Extension MMT, Gross Movement (5/5) normal  -EN         Functional Mobility    Functional Mobility- Comment walking independently without an assistive device  -EN         ADL Assessment/Intervention    Comment, IADL Assessment/Training reports independence with ADLs. Reports only concern is difficulty with typing with right hand due to wrist drop/limited finger extension.  -EN                User Key  (r) = Recorded By, (t) = Taken By, (c) = Cosigned By      Initials Name Provider Type    EN Diana Richmond OTR Occupational Therapist                    Hand Therapy (Last 24 Hours)       Hand Eval       Row Name 06/11/24 1000             Splint Form    Splint Type --  wrist cock up issued to wear while typing (right wrist)  -EN          Strength Right    # Reps 3  -EN      Right Rung 2  -EN      Right  Test 1 21  -EN      Right  Test 2 29  -EN      Right  Test 3 26  -EN       Strength Average Right 25.33  -EN          Strength Left    # Reps 3  -EN      Left Rung 2  -EN      Left  Test 1 60  -EN      Left  Test 2 47  -EN      Left   Test 3 60  -EN       Strength Average Left 55.67  -EN         Right Hand Strength - Pinch (lbs)    Lateral 6 lbs  6, 6, 6  -EN      Tip (3 point) 6.3 lbs  6,7,6  -EN         Left Hand Strength - Pinch (lbs)    Lateral 13.3 lbs  12,14,14  -EN      Tip (3 point) 14.7 lbs  16,14,14  -EN         Therapy Education    Education Details Educated on right wrist cock up to wear while typing at work. Reinforced HEP.  -EN      Given HEP;Symptoms/condition management  -EN      Program Reinforced;New  -EN      How Provided Verbal;Demonstration  -EN      Provided to Patient  -EN      Level of Understanding Teach back education performed;Verbalized;Demonstrated  -EN                User Key  (r) = Recorded By, (t) = Taken By, (c) = Cosigned By      Initials Name Provider Type    Diana Grimes OTR Occupational Therapist                        Therapy Education  Education Details: Educated on right wrist cock up to wear while typing at work. Reinforced HEP.  Given: HEP, Symptoms/condition management  Program: Reinforced, New  How Provided: Verbal, Demonstration  Provided to: Patient  Level of Understanding: Teach back education performed, Verbalized, Demonstrated     OT Goals       Row Name 06/11/24 1000          OT Short Term Goals    STG Date to Achieve 12/13/23  -EN     STG 1 Patient will demonstrate improved right  strength by 3# for improved grasp of objects. (Currently 25# pounds)  -EN     STG 1 Progress Met  -EN     STG 2 Patient will report modified independence with ADL/IADL routine (compensatory strategies, adaptive equipment, etc..)  -EN     STG 2 Progress Met  -EN        Long Term Goals    LTG Date to Achieve 08/13/24  -EN     LTG 1 Patient will demonstrate improved fine motor coordination evidenced by a 5 second improved 9 hole peg score (right hand)  -EN     LTG 1 Progress Met  -EN     LTG 2 Patient will demonstrate improved right hand  strength by 20 pounds for improved grasp of  objects.(30#)  -EN     LTG 2 Progress Ongoing;Progressing  -EN     LTG 3 Patient will improve Quick Dash score 50 points.  -EN     LTG 3 Progress Ongoing;Progressing  -EN               User Key  (r) = Recorded By, (t) = Taken By, (c) = Cosigned By      Initials Name Provider Type    Diana Grimes OTR Occupational Therapist                            OT Assessment/Plan       Row Name 06/11/24 1228          OT Assessment    Assessment Comments Patient reports she is now receiving IVIG infusions every 4 weeks and reports she's noticed an improvement. Patient states she has no pain and has not had any spasms lately. Patient reports the only difficulty she has noticed is keyboarding at work and states the wrist drop and limited finger extension makes keyboarding very difficult. Patient issued a wrist cock up to wear while typing for improved performance. Patient demonstrated significant improvements with  strength and coordination compared to April measurements (on vacation in May). Right  improved from 10.7 pounds to 25.3 pounds.  L  improved from 43 pounds to 55.7 pounds. Patient demonstrated a slight decrease in pinch strength in both hands. R lateral pinch decreased from 6.7 pounds to 6 pounds and 3 point pinch decreaesd from 6.7 pounds to 6.3 pounds.  Left lateral pinch improved from 11.7 pounds to 13.3 pounds and left 3 point pinch decreased from 15.3 pounds to 14.7 pounds.  Right hand coordination  (9 Hole Peg score) improved from 23.94 seconds to 21.12 seconds and left hand coordination time improved from 17.42 seconds to 15.01 seconds. Overall patient reports she feels better but still fatigued. Patient is doing well with HEP and utilizing compensatory strategies for improved ADL/IADL independence.  -EN     OT Diagnosis MMN, decreased B UE functional use, especially R UE  -EN     OT Rehab Potential Good  -EN        OT Plan    Planned CPT's? OT EVAL LOW COMPLEXITY: 12218  -EN     Planned  Therapy Interventions (Optional Details) home exercise program;patient/family education;strengthening  -EN     OT Plan Comments Continue with plan of care.  -EN               User Key  (r) = Recorded By, (t) = Taken By, (c) = Cosigned By      Initials Name Provider Type    Diana Grimes OTR Occupational Therapist                   OT Exercises       Row Name 06/11/24 0945             Exercise 1    Exercise Name 1 Review of HEP and compensatory strategies  -EN         Exercise 2    Exercise Name 2 Educated on right wrist cock up and use during keyboarding at work  -EN                User Key  (r) = Recorded By, (t) = Taken By, (c) = Cosigned By      Initials Name Provider Type    Diana Grimes OTR Occupational Therapist                      9 Hole Peg  9-Hole Peg Left: 15.01  9-Hole Peg Right: 21.12  Quick DASH  Open a tight or new jar.: Moderate Difficulty  Do heavy household chores (e.g., wash walls, wash floors): Severe Difficulty  Carry a shopping bag or briefcase: Moderate Difficulty  Wash your back: Severe Difficulty  Use a knife to cut food: Severe Difficulty  Recreational activities in which you take some force or impact through your arm, should or hand (e.g. golf, hammering, tennis, etc.): Unable  During the past week, to what extent has your arm, shoulder, or hand problem interfered with your normal social activites with family, friends, neighbors or groups?: Slightly  During the past week, were you limited in your work or other regular daily activities as a result of your arm, shoulder or hand problem?: Moderately Limited  Arm, Shoulder, or hand pain: Mild  Tingling (pins and needles) in your arm, shoulder, or hand: Moderate  During the past week, how much difficulty have you had sleeping because of the pain in your arm, shoulder or hand?: No difficulty  Number of Questions Answered: 11  Quick DASH Score: 52.27  Work Module (Optional)  Using your usual technique for your work?:  Moderate Difficulty  Doing your usual work because of arm, shoulder or hand pain?: Moderate Difficulty  Doing your work as well as you would like?: Moderate Difficulty  Spending your usual amount of time doing your work?: Moderate Difficulty  Work Module Score: 50         Time Calculation:   OT Start Time: 0945  OT Stop Time: 1015  OT Time Calculation (min): 30 min     Therapy Charges for Today       Code Description Service Date Service Provider Modifiers Qty    [id number]  OT RE-EVAL 2 6/11/2024 Diana Richmond OTR GO 1                       JESSY Medina  6/11/2024

## 2024-07-10 ENCOUNTER — HOSPITAL ENCOUNTER (OUTPATIENT)
Dept: OCCUPATIONAL THERAPY | Facility: HOSPITAL | Age: 39
Setting detail: THERAPIES SERIES
Discharge: HOME | End: 2024-07-10
Payer: COMMERCIAL

## 2024-07-10 ENCOUNTER — HOSPITAL ENCOUNTER (OUTPATIENT)
Dept: PHYSICAL THERAPY | Facility: HOSPITAL | Age: 39
Setting detail: THERAPIES SERIES
Discharge: HOME | End: 2024-07-10
Payer: COMMERCIAL

## 2024-07-10 DIAGNOSIS — G61.82 MMN (MULTIFOCAL MOTOR NEUROPATHY): Primary | ICD-10-CM

## 2024-07-10 DIAGNOSIS — G61.82 MMN (MULTIFOCAL MOTOR NEUROPATHY): ICD-10-CM

## 2024-07-10 DIAGNOSIS — G61.82 MULTIFOCAL MOTOR NEUROPATHY: Primary | ICD-10-CM

## 2024-07-10 PROCEDURE — 97116 GAIT TRAINING THERAPY: CPT

## 2024-07-10 PROCEDURE — 97530 THERAPEUTIC ACTIVITIES: CPT

## 2024-07-10 PROCEDURE — 97112 NEUROMUSCULAR REEDUCATION: CPT

## 2024-07-10 PROCEDURE — 97535 SELF CARE MNGMENT TRAINING: CPT

## 2024-07-10 PROCEDURE — 97168 OT RE-EVAL EST PLAN CARE: CPT

## 2024-07-10 NOTE — THERAPY TREATMENT NOTE
Outpatient Physical Therapy Neuro Treatment Note   [place]     Patient Name: Delphine King  : 1985  MRN: [id number]  Today's Date: 7/10/2024      Visit Date: 07/10/2024    Visit Dx:    ICD-10-CM ICD-9-CM   1. MMN (multifocal motor neuropathy) ([facility]): Receieved IVIG  G61.82 357.89       Patient Active Problem List   Diagnosis    Rectovaginal fistula: s/p repair in 2016    History of pulmonary embolism: lifelong anticoagulation    MMN (multifocal motor neuropathy)    Morbid (severe) obesity due to excess calories (*specify comorbidity)            PT Neuro       Row Name 07/10/24 1100             Subjective    Subjective Comments Pt reports increased difficulty with sit-stand oscar when getting out of the car.  -JV                User Key  (r) = Recorded By, (t) = Taken By, (c) = Cosigned By      Initials Name Provider Type    Cleopatra Glynn, PT Physical Therapist                             PT Assessment/Plan       Row Name 07/10/24 1357          PT Assessment    Functional Limitations Decreased safety during functional activities;Impaired gait;Limitation in home management;Limitations in community activities;Performance in leisure activities;Performance in self-care ADL  -JV     Impairments Balance;Coordination;Endurance;Gait;Impaired muscle endurance;Motor function;Muscle strength  -JV     Assessment Comments The pt presents for tx this date and has next scheduled infusion -. Pt reports increased difficulty with sit-stand, oscar when getting in/out of the car. Pt also c/o new onset pain in Oj feet, worse in the morning. Pt was provided with info for stretching and taping Oj feet for plantar fasciitis. The pt was assessed using the LEFS, TUG and 5XSTS. LEFS decreased from 30 to 29 pts, TUG time decreased from 10.26 to 9.99 sec, and 5XSTS increased from 19.54 to 25.83 sec.  -JV        PT Plan    PT Plan Comments Cont plan, assess as needed.  -JV               User Key  (r) = Recorded By, (t) =  Taken By, (c) = Cosigned By      Initials Name Provider Type    Cleopatra Glynn PT Physical Therapist                        OP Exercises       Row Name 07/10/24 1100             Precautions    Existing Precautions/Restrictions fall  -JV         Subjective    Subjective Comments Pt reports increased difficulty with sit-stand oscar when getting out of the car.  -JV         Exercise 1    Exercise Name 1 Pt was assessed with the TUG, 5XSTS and LEFS.  -JV         Exercise 2    Exercise Name 2 Pt c/o pain Oj feet, provided with stretching and taping instructions for plantar fasciitis.  -JV                User Key  (r) = Recorded By, (t) = Taken By, (c) = Cosigned By      Initials Name Provider Type    Cleopatra Glynn, PT Physical Therapist                                    Therapy Education  Education Details: Cont HEP, use T'band around knees to stabilize while performing sit-stand from chair.  Given: HEP  Program: Reinforced, Modified  How Provided: Verbal, Demonstration  Provided to: Patient  Level of Understanding: Teach back education performed, Verbalized    Outcome Measure Options: Lower Extremity Functional Scale (LEFS), 5x Sit to Stand, Timed Up and Go (TUG)  5 Times Sit to Stand  5 Times Sit to Stand (seconds): 25.83 seconds  5 Times Sit to Stand Comments: Previous time 19.54  Lower Extremity Functional Index  Any of your usual work, housework or school activities: A little bit of difficulty  Your usual hobbies, recreational or sporting activities: A little bit of difficulty  Getting into or out of the bath: A little bit of difficulty  Walking between rooms: A little bit of difficulty  Putting on your shoes or socks: Moderate difficulty  Squatting: Extreme difficulty or unable to perform activity  Lifting an object, like a bag of groceries from the floor: Quite a bit of difficulty  Performing light activities around your home: Moderate difficulty  Performing heavy activities around your home: Extreme  difficulty or unable to perform activity  Getting into or out of a car: Quite a bit of difficulty  Walking 2 blocks: A little bit of difficulty  Walking a mile: Extreme difficulty or unable to perform activity  Going up or down 10 stairs (about 1 flight of stairs): Quite a bit of difficulty  Standing for 1 hour: Moderate difficulty  Sitting for 1 hour: Moderate difficulty  Running on even ground: Extreme difficulty or unable to perform activity  Running on uneven ground: Extreme difficulty or unable to perform activity  Making sharp turns while running fast: Extreme difficulty or unable to perform activity  Hopping: Extreme difficulty or unable to perform activity  Rolling over in bed: A little bit of difficulty  Total: 29  Timed Up and Go (TUG)  TUG Test 1: 9.99 seconds  TUG Test 2: 11.26 seconds  Timed Up and Go Comments: Second attempt with dual task of counting backward by 3      Time Calculation:   Start Time: 1100  Stop Time: 1200  Time Calculation (min): 60 min   Therapy Charges for Today       Code Description Service Date Service Provider Modifiers Qty    22935005301 HC PT NEUROMUSC RE EDUCATION EA 15 MIN 7/10/2024 Cleopatra Hoffmann, PT GP 1    67250524679 HC GAIT TRAINING EA 15 MIN 7/10/2024 Cleopatra Hoffmann, PT GP 1    42924302151 HC PT THERAPEUTIC ACT EA 15 MIN 7/10/2024 Cleopatra Hoffmann, PT GP 1    67142760855 HC PT SELF CARE/MGMT/TRAIN EA 15 MIN 7/10/2024 Cleopatra Hoffmann, PT GP 1            PT G-Codes  Outcome Measure Options: Lower Extremity Functional Scale (LEFS), 5x Sit to Stand, Timed Up and Go (TUG)  Total: 29  TUG Test 1: 9.99 seconds  TUG Test 2: 11.26 seconds         Cleopatra Hoffmann PT  7/10/2024

## 2024-07-10 NOTE — THERAPY RE-EVALUATION
Outpatient Occupational Therapy Neuro Re-Evaluation   [facility]     Patient Name: Delphine King  : 1985  MRN: [id number]  Today's Date: 7/10/2024      Visit Date: 07/10/2024    Patient Active Problem List   Diagnosis    Rectovaginal fistula: s/p repair in     History of pulmonary embolism: lifelong anticoagulation    MMN (multifocal motor neuropathy)    Morbid (severe) obesity due to excess calories (*specify comorbidity)        Past Medical History:   Diagnosis Date    Anxiety     Asthma     Cholelithiasis     Colon polyp     Deep vein thrombosis     Depression     Fissure, anal 2017    Hyperlipidemia     Kidney stones     MMN (multifocal motor neuropathy)     Neuropathy     PE (pulmonary thromboembolism) 2017    Stroke         Past Surgical History:   Procedure Laterality Date    ANAL FISTULA REPAIR      multiple repairs     SECTION      CHOLECYSTECTOMY WITH INTRAOPERATIVE CHOLANGIOGRAM N/A 2023    Procedure: CHOLECYSTECTOMY LAPAROSCOPIC INTRAOPERATIVE CHOLANGIOGRAM;  Surgeon: Goldie Mandujano DO;  Location: [facility];  Service: General;  Laterality: N/A;    ENDOSCOPY N/A 2023    Procedure: ESOPHAGOGASTRODUODENOSCOPY WITH COLD BX'S;  Surgeon: Mitch Barclay MD;  Location: [facility] ENDOSCOPY;  Service: Gastroenterology;  Laterality: N/A;  pre: abdominal pain  post: gastritis    ERCP N/A 2023    Procedure: ENDOSCOPIC RETROGRADE CHOLANGIOPANCREATOGRAPHY WITH SPHINCTEROTOMY AND BALOON SWEEP;  Surgeon: Mitch Barclay MD;  Location: [facility] ENDOSCOPY;  Service: Gastroenterology;  Laterality: N/A;  pre: biliary obstruction, choledocholithiasis  post: choledocholithiasis    HYSTERECTOMY  2017    PORTACATH PLACEMENT Right     power port    TONSILLECTOMY  2003    WISDOM TOOTH EXTRACTION  2003         Visit Dx:      ICD-10-CM ICD-9-CM   1. Multifocal motor neuropathy  G61.82 357.89   2. MMN (multifocal motor neuropathy) ([facility]): Receieved IVIG  G61.82 357.89            OT Neuro   "     Row Name 07/10/24 1015             Subjective Comments    Subjective Comments Patient reports \"they upped my anti-depressant and it made me more foggy\".  -EN         Subjective Pain    Subjective Pain Comment Patient reports she has some pain in arches of feet, \"I think it might be my shoes\" Reports she will talk to the physical therapist about it. Reports no UE pain.  -EN         Sensation    Additional Comments Reports no numbness in UEs.  -EN         Posture/Observations    Posture/Observations Comments Patient walking without a device.  Continues to have limited right hand/finger extension. Patient reports she has had this issue for 12 years (hand), \"I don't think it's going to get better.\"  -EN         Right Upper Ext    Rt Shoulder Abduction AROM WFL  -EN      Rt Shoulder Flexion AROM WFL  -EN      Rt Elbow Supination AROM WFL  -EN      Rt Elbow Pronation AROM WFL  -EN      Rt Wrist Flexion AROM WFL  -EN      Rt Wrist Extension AROM history of right wrist drop and limited finger extension in digits 3,4, and 5.  -EN      Rt Upper Extremity Comments  Reports wrist cock up has helped with typing skills.  -EN         Left Upper Ext    Lt Shoulder Abduction AROM WFL  -EN      Lt Shoulder Flexion AROM WFL  -EN      Lt Elbow Extension/Flexion AROM WFL  -EN      Lt Elbow Supination AROM WFL  -EN      Lt Elbow Pronation AROM WFL  -EN      Lt Wrist Flexion AROM WFL  -EN      Lt Wrist Extension AROM WFL  -EN         MMT Right Upper Ext    Rt Shoulder Flexion MMT, Gross Movement (4/5) good  -EN      Rt Shoulder Extension MMT, Gross Movement (4+/5) good plus  -EN      Rt Shoulder ABduction MMT, Gross Movement (4/5) good  -EN      Rt Shoulder ADduction MMT, Gross Movement (4+/5) good plus  -EN      Rt Elbow Flexion MMT, Gross Movement: (4+/5) good plus  -EN      Rt Elbow Extension MMT, Gross Movement: (4/5) good  -EN      Rt Forearm Supination MMT, Gross Movement (4/5) good  -EN      Rt Forearm Pronation MMT, Gross " Movement (4/5) good  -EN      Rt Wrist Flexion MMT, Gross Movement (3+/5) fair plus  -EN      Rt Wrist Extension MMT, Gross Movement (3/5) fair  -EN         MMT Left Upper Ext    Lt Shoulder Flexion MMT, Gross Movement (5/5) normal  -EN      Lt Shoulder Extension MMT, Gross Movement (5/5) normal  -EN      Lt Shoulder ABduction MMT, Gross Movement (5/5) normal  -EN      Lt Shoulder ADduction MMT, Gross Movement (5/5) normal  -EN      Lt Elbow Flexion MMT, Gross Movement (5/5) normal  -EN      Lt Elbow Extension MMT, Gross Movement (5/5) normal  -EN      Lt Forearm Supination MMT, Gross Movement (5/5) normal  -EN      Lt Forearm Pronation MMT, Gross Movement (5/5) normal  -EN      Lt Wrist Flexion MMT, Gross Movement (5/5) normal  -EN      Lt Wrist Extension MMT, Gross Movement (5/5) normal  -EN         Functional Mobility    Functional Mobility- Comment walking independently without a cane  -EN         ADL Assessment/Intervention    Comment, IADL Assessment/Training Reports she continues to be independent with ADL/IADLs. Reports she's able to vacuum, dust, and basically all light house work.  -EN                User Key  (r) = Recorded By, (t) = Taken By, (c) = Cosigned By      Initials Name Provider Type    EN Diana Richmond OTR Occupational Therapist                    Hand Therapy (Last 24 Hours)       Hand Eval       Row Name 07/10/24 1100             Splint Form    Splint Type --  reports she wears wrist cock up for typing and this has helped  -EN         Hand ROM Tested?    Hand ROM Tested? --  no changes in B hand AROM  -EN          Strength Right    # Reps 3  -EN      Right Rung 2  -EN      Right  Test 1 15  -EN      Right  Test 2 20  -EN      Right  Test 3 23  -EN       Strength Average Right 19.33  -EN          Strength Left    # Reps 3  -EN      Left Rung 2  -EN      Left  Test 1 67  -EN      Left  Test 2 49  -EN      Left  Test 3 56  -EN       Strength  Average Left 57.33  -EN         Right Hand Strength - Pinch (lbs)    Lateral 6 lbs  7, 5, 6  -EN      Tip (3 point) 7 lbs  8,6,7  -EN         Left Hand Strength - Pinch (lbs)    Lateral 11.7 lbs  11,12,12  -EN      Tip (3 point) 14.67 lbs  16,14,14  -EN         Therapy Education    Given HEP  -EN      Program Reinforced  -EN      How Provided Verbal;Demonstration  -EN      Provided to Patient  -EN      Level of Understanding Teach back education performed;Verbalized;Demonstrated  -EN                User Key  (r) = Recorded By, (t) = Taken By, (c) = Cosigned By      Initials Name Provider Type    Diana Grimes OTR Occupational Therapist                        Therapy Education  Given: HEP  Program: Reinforced  How Provided: Verbal, Demonstration  Provided to: Patient  Level of Understanding: Teach back education performed, Verbalized, Demonstrated     OT Goals       Row Name 07/10/24 1100          OT Short Term Goals    STG Date to Achieve 12/13/23  -EN     STG 1 Patient will demonstrate improved right  strength by 3# for improved grasp of objects. (Currently 25# pounds)  -EN     STG 1 Progress Met  -EN     STG 2 Patient will report modified independence with ADL/IADL routine (compensatory strategies, adaptive equipment, etc..)  -EN     STG 2 Progress Met  -EN        Long Term Goals    LTG Date to Achieve 08/13/24  -EN     LTG 1 Patient will demonstrate improved fine motor coordination evidenced by a 5 second improved 9 hole peg score (right hand)  -EN     LTG 1 Progress Met  -EN     LTG 2 Patient will demonstrate improved right hand  strength by 20 pounds for improved grasp of objects.(30#)  -EN     LTG 2 Progress Ongoing  -EN     LTG 3 Patient will improve Quick Dash score 50 points.  -EN     LTG 3 Progress Ongoing;Progressing  -EN               User Key  (r) = Recorded By, (t) = Taken By, (c) = Cosigned By      Initials Name Provider Type    Diana Grimes OTR Occupational Therapist  "                           OT Assessment/Plan       Row Name 07/10/24 1122          OT Assessment    Functional Limitations Limitation in home management;Limitations in community activities;Performance in leisure activities;Performance in self-care ADL  -EN     Impairments Coordination;Dexterity;Muscle strength;Pain;Range of motion  -EN     Assessment Comments Patient reports she continues to receive IVIG infusions every 4 weeks and has infusions next week. Patient states she has been a little more \"foggy\" and tired the past couple weeks due to PCP \"upping my antidepressant\". Patient reports no UE pain and states she continues to be modified independent with light IADLs and ADLs. Patient states she's been using her wrist cock up splint while typing and this has helped. Patient's right  decreased since last month however left  increased. There were no significant changes in pinch strength. Patient's right 9 hole peg score improved slightly and Left 9 hole peg score had no significant change. Measurements are as follows:  Right  decreased from 25.3 pounds to 19.33 pounds.  L  improved from 55.7 pounds to 57.33 pounds.  R lateral pinch did not change and was 6 pounds and right 3 point pinch increaesd from 6.3 pounds to 7 pounds.  Left lateral pinch decreased from 13.3 pounds to 11.7 pounds and left 3 point pinch increased from 14.7 pounds to 15.96 pounds.  Right hand coordination  (9 Hole Peg score) improved from 21.12 seconds to 19.79 seconds and left hand coordination time decreased from 15.01 seconds to 15.96 seconds. Overall patient reports she feels better but still fatigued. Patient is doing well with HEP and utilizing compensatory strategies for improved ADL/IADL independence.  -EN     OT Diagnosis MMN, decreased B UE functional use, oscar R UE  -EN     OT Rehab Potential Good  -EN        OT Plan    OT Frequency --  reassessment every 4 weeks  -EN     Predicted Duration of Therapy Intervention (OT) " 6 months  -EN     Planned CPT's? OT EVAL LOW COMPLEXITY: 11526  -EN     Planned Therapy Interventions (Optional Details) home exercise program;patient/family education;strengthening  -EN     OT Plan Comments Continue with plan of care.  -EN               User Key  (r) = Recorded By, (t) = Taken By, (c) = Cosigned By      Initials Name Provider Type    Diana Grimes, OTR Occupational Therapist                        9 Hole Peg  9-Hole Peg Left: 15.96  9-Hole Peg Right: 19.79  Quick DASH  Open a tight or new jar.: Severe Difficulty  Do heavy household chores (e.g., wash walls, wash floors): Unable  Carry a shopping bag or briefcase: Mild Difficulty  Wash your back: Unable  Use a knife to cut food: Moderate Difficulty  Recreational activities in which you take some force or impact through your arm, should or hand (e.g. golf, hammering, tennis, etc.): Unable  During the past week, to what extent has your arm, shoulder, or hand problem interfered with your normal social activites with family, friends, neighbors or groups?: Not at all  During the past week, were you limited in your work or other regular daily activities as a result of your arm, shoulder or hand problem?: Not limited at all  Arm, Shoulder, or hand pain: Mild  Tingling (pins and needles) in your arm, shoulder, or hand: Moderate  During the past week, how much difficulty have you had sleeping because of the pain in your arm, shoulder or hand?: No difficulty  Number of Questions Answered: 11  Quick DASH Score: 47.73  Work Module (Optional)  Using your usual technique for your work?: Moderate Difficulty  Doing your usual work because of arm, shoulder or hand pain?: Mild Difficulty  Doing your work as well as you would like?: Mild Difficulty  Spending your usual amount of time doing your work?: Moderate Difficulty  Work Module Score: 37.5         Time Calculation:   OT Start Time: 1015  OT Stop Time: 1045  OT Time Calculation (min): 30 min     Therapy  Charges for Today       Code Description Service Date Service Provider Modifiers Qty    56952658996 HC OT RE-EVAL 2 7/10/2024 Diana Richmond, OTR GO 1                       Diana Richmond, OTPATRICIA  7/10/2024

## 2024-08-07 ENCOUNTER — HOSPITAL ENCOUNTER (OUTPATIENT)
Dept: PHYSICAL THERAPY | Facility: HOSPITAL | Age: 39
Setting detail: THERAPIES SERIES
Discharge: HOME | End: 2024-08-07
Payer: COMMERCIAL

## 2024-08-07 ENCOUNTER — HOSPITAL ENCOUNTER (OUTPATIENT)
Dept: OCCUPATIONAL THERAPY | Facility: HOSPITAL | Age: 39
Setting detail: THERAPIES SERIES
Discharge: HOME | End: 2024-08-07
Payer: COMMERCIAL

## 2024-08-07 DIAGNOSIS — G61.82 MMN (MULTIFOCAL MOTOR NEUROPATHY): Primary | ICD-10-CM

## 2024-08-07 DIAGNOSIS — G61.82 MULTIFOCAL MOTOR NEUROPATHY: Primary | ICD-10-CM

## 2024-08-07 DIAGNOSIS — G61.82 MMN (MULTIFOCAL MOTOR NEUROPATHY): ICD-10-CM

## 2024-08-07 PROCEDURE — 97010 HOT OR COLD PACKS THERAPY: CPT

## 2024-08-07 PROCEDURE — 97110 THERAPEUTIC EXERCISES: CPT

## 2024-08-07 PROCEDURE — 97168 OT RE-EVAL EST PLAN CARE: CPT

## 2024-08-07 PROCEDURE — G0283 ELEC STIM OTHER THAN WOUND: HCPCS

## 2024-08-07 NOTE — THERAPY EVALUATION
Outpatient Occupational Therapy Neuro Re-Evaluation   Robyn Karimi     Patient Name: Delphine King  : 1985  MRN: [id number]  Today's Date: 2024      Visit Date: 2024    Patient Active Problem List   Diagnosis    Rectovaginal fistula: s/p repair in     History of pulmonary embolism: lifelong anticoagulation    MMN (multifocal motor neuropathy)    Morbid (severe) obesity due to excess calories (*specify comorbidity)        Past Medical History:   Diagnosis Date    Anxiety     Asthma     Cholelithiasis     Colon polyp     Deep vein thrombosis     Depression     Fissure, anal 2017    Hyperlipidemia     Kidney stones     MMN (multifocal motor neuropathy)     Neuropathy     PE (pulmonary thromboembolism) 2017    Stroke         Past Surgical History:   Procedure Laterality Date    ANAL FISTULA REPAIR      multiple repairs     SECTION      CHOLECYSTECTOMY WITH INTRAOPERATIVE CHOLANGIOGRAM N/A 2023    Procedure: CHOLECYSTECTOMY LAPAROSCOPIC INTRAOPERATIVE CHOLANGIOGRAM;  Surgeon: Goldie Mandujano DO;  Location: [facility];  Service: General;  Laterality: N/A;    ENDOSCOPY N/A 2023    Procedure: ESOPHAGOGASTRODUODENOSCOPY WITH COLD BX'S;  Surgeon: Mitch Barclay MD;  Location: [facility] ENDOSCOPY;  Service: Gastroenterology;  Laterality: N/A;  pre: abdominal pain  post: gastritis    ERCP N/A 2023    Procedure: ENDOSCOPIC RETROGRADE CHOLANGIOPANCREATOGRAPHY WITH SPHINCTEROTOMY AND BALOON SWEEP;  Surgeon: Mitch Barclay MD;  Location: [facility] ENDOSCOPY;  Service: Gastroenterology;  Laterality: N/A;  pre: biliary obstruction, choledocholithiasis  post: choledocholithiasis    HYSTERECTOMY  2017    PORTACATH PLACEMENT Right     power port    TONSILLECTOMY  2003    WISDOM TOOTH EXTRACTION  2003         Visit Dx:      ICD-10-CM ICD-9-CM   1. Multifocal motor neuropathy  G61.82 357.89   2. MMN (multifocal motor neuropathy) (HCC): Receieved IVIG  G61.82 357.89            OT Neuro   "     Row Name 08/07/24 1100             Subjective Comments    Subjective Comments Patient reports she continues to use cock up brace for typing. Reports exercise program is going well and she continues to be independent with ADLs/IADLs.  -EN         Subjective Pain    Subjective Pain Comment Patient reports no pain, \"just fatigue\"  -EN         Coordination    9-Hole Peg Left 17.37  -EN      9-Hole Peg Right 19.95  -EN         Right Upper Ext    Rt Shoulder Abduction AROM WFL  -EN      Rt Shoulder Flexion AROM WFL  -EN      Rt Elbow Supination AROM WFL  -EN      Rt Elbow Pronation AROM WFL  -EN      Rt Wrist Flexion AROM WFL  -EN      Rt Wrist Extension AROM history of right wrist drop and limited finger extension in digits 3, 4, and 5 (>10 years)  -EN         Left Upper Ext    Lt Shoulder Abduction AROM WFL  -EN      Lt Shoulder Flexion AROM WFL  -EN      Lt Elbow Extension/Flexion AROM WFL  -EN      Lt Elbow Supination AROM WFL  -EN      Lt Elbow Pronation AROM WFL  -EN      Lt Wrist Flexion AROM WFL  -EN      Lt Wrist Extension AROM WFL  -EN         MMT Right Upper Ext    Rt Shoulder Flexion MMT, Gross Movement (4/5) good  -EN      Rt Shoulder Extension MMT, Gross Movement (4+/5) good plus  -EN      Rt Shoulder ABduction MMT, Gross Movement (4/5) good  -EN      Rt Shoulder ADduction MMT, Gross Movement (4+/5) good plus  -EN      Rt Elbow Flexion MMT, Gross Movement: (4+/5) good plus  -EN      Rt Elbow Extension MMT, Gross Movement: (4+/5) good plus  -EN      Rt Forearm Supination MMT, Gross Movement (4+/5) good plus  -EN      Rt Forearm Pronation MMT, Gross Movement (4/5) good  -EN      Rt Wrist Flexion MMT, Gross Movement (3+/5) fair plus  -EN      Rt Wrist Extension MMT, Gross Movement (3/5) fair  -EN         MMT Left Upper Ext    Lt Shoulder Flexion MMT, Gross Movement (5/5) normal  -EN      Lt Shoulder Extension MMT, Gross Movement (5/5) normal  -EN      Lt Shoulder ABduction MMT, Gross Movement (5/5) normal  " -EN      Lt Shoulder ADduction MMT, Gross Movement (5/5) normal  -EN      Lt Elbow Flexion MMT, Gross Movement (5/5) normal  -EN      Lt Elbow Extension MMT, Gross Movement (5/5) normal  -EN      Lt Forearm Supination MMT, Gross Movement (5/5) normal  -EN      Lt Forearm Pronation MMT, Gross Movement (5/5) normal  -EN      Lt Wrist Flexion MMT, Gross Movement (5/5) normal  -EN      Lt Wrist Extension MMT, Gross Movement (5/5) normal  -EN         Functional Mobility    Functional Mobility- Comment walking without an AD, wearing a left swedish knee cage which she received last week and reports it is helping the knee hyperextension she was having climbing steps.  -EN         ADL Assessment/Intervention    Comment, IADL Assessment/Training Reports she continues to be independent with ADLs/IADLs. Continues to use wrist cock up brace when typing.  -EN                User Key  (r) = Recorded By, (t) = Taken By, (c) = Cosigned By      Initials Name Provider Type    Diana Grimes OTR Occupational Therapist                    Hand Therapy (Last 24 Hours)       Hand Eval       Row Name 08/07/24 1000              Strength Right    # Reps 3  -EN      Right Rung 2  -EN      Right  Test 1 20  -EN      Right  Test 2 20  -EN      Right  Test 3 30  -EN       Strength Average Right 23.33  -EN          Strength Left    # Reps 3  -EN      Left Rung 2  -EN      Left  Test 1 63  -EN      Left  Test 2 44  -EN      Left  Test 3 63  -EN       Strength Average Left 56.67  -EN         Right Hand Strength - Pinch (lbs)    Lateral 6.67 lbs  8,6,6  -EN      Tip (3 point) 6 lbs  6,6,6  -EN         Left Hand Strength - Pinch (lbs)    Lateral 12.33 lbs  12,12,13  -EN      Tip (3 point) 16 lbs  16,16,16  -EN         Therapy Education    Education Details Continue with theraband and theraputty.  -EN      Given HEP;Symptoms/condition management  -EN      Program Reinforced  -EN      How Provided  Verbal;Demonstration  -EN      Provided to Patient  -EN      Level of Understanding Teach back education performed;Verbalized;Demonstrated  -EN                User Key  (r) = Recorded By, (t) = Taken By, (c) = Cosigned By      Initials Name Provider Type    Diana Grimes OTR Occupational Therapist                        Therapy Education  Education Details: Continue with theraband and theraputty.  Given: HEP, Symptoms/condition management  Program: Reinforced  How Provided: Verbal, Demonstration  Provided to: Patient  Level of Understanding: Teach back education performed, Verbalized, Demonstrated     OT Goals       Row Name 08/07/24 1030          OT Short Term Goals    STG Date to Achieve 12/13/23  -EN     STG 1 Patient will demonstrate improved right  strength by 3# for improved grasp of objects. (Currently 25# pounds)  -EN     STG 1 Progress Met  -EN     STG 2 Patient will report modified independence with ADL/IADL routine (compensatory strategies, adaptive equipment, etc..)  -EN     STG 2 Progress Met  -EN        Long Term Goals    LTG Date to Achieve 10/02/24  -EN     LTG 1 Patient will demonstrate improved fine motor coordination evidenced by a 5 second improved 9 hole peg score (right hand)  -EN     LTG 1 Progress Met  -EN     LTG 2 Patient will demonstrate improved right hand  strength by 20 pounds for improved grasp of objects.(30#)  -EN     LTG 2 Progress Ongoing  -EN     LTG 3 Patient will improve Quick Dash score 50 points.  -EN     LTG 3 Progress Ongoing;Progressing  -EN               User Key  (r) = Recorded By, (t) = Taken By, (c) = Cosigned By      Initials Name Provider Type    Diana Grimes OTR Occupational Therapist                            OT Assessment/Plan       Row Name 08/07/24 1233          OT Assessment    Assessment Comments Patient reports she continues to receive IVIG infusions every 4 weeks and has one next week. Patient reports she is not having pain,  just fatigue. Patient continues to use compensatory strategies/adaptive equipment for ADL/IADL independence. Patient's strength and coordination did not change significantly the past month. Patient's right  increased since last month however left  decreased slightly. There were no significant changes in pinch strength. Patient's 9 hole peg scores decreased slightly. Measurements are as follows:  Right  increased from 19.33 pounds to 23.33 pounds.  L  decreased from 57.33 pounds to 56.67 pounds.  R lateral pinch improved from 6 pounds to 6.67 and right 3 point pinch decreased from 7 pounds to 6 pounds.  Left lateral pinch increased from 11.7pounds to 12.33 pounds and left 3 point pinch increased from 15.96 pounds to 16 pounds.  Right hand coordination  (9 Hole Peg score) decreased from 19.79 seconds to 19.95 seconds and left hand coordination time delclined from 15.96 seconds to 17.37 seconds. Patient is doing well with HEP and is pain free.  -EN     OT Diagnosis MMN, decreased B UE functional use, especially R UE  -EN     OT Rehab Potential Good  -EN        OT Plan    OT Frequency --  reassessment every 4 weeks  -EN     Planned CPT's? OT EVAL LOW COMPLEXITY: 19681  -EN     Planned Therapy Interventions (Optional Details) home exercise program;patient/family education;strengthening  -EN     OT Plan Comments Continue with plan of care  -EN               User Key  (r) = Recorded By, (t) = Taken By, (c) = Cosigned By      Initials Name Provider Type    EN Diana Richmond, OTR Occupational Therapist                        9 Hole Peg  9-Hole Peg Left: 17.37  9-Hole Peg Right: 19.95  Quick DASH  Open a tight or new jar.: Moderate Difficulty  Do heavy household chores (e.g., wash walls, wash floors): Severe Difficulty  Carry a shopping bag or briefcase: Mild Difficulty  Wash your back: Unable  Use a knife to cut food: Mild Difficulty  Recreational activities in which you take some force or impact  through your arm, should or hand (e.g. golf, hammering, tennis, etc.): Unable  During the past week, to what extent has your arm, shoulder, or hand problem interfered with your normal social activites with family, friends, neighbors or groups?: Slightly  During the past week, were you limited in your work or other regular daily activities as a result of your arm, shoulder or hand problem?: Slightly Limited  Arm, Shoulder, or hand pain: Mild  Tingling (pins and needles) in your arm, shoulder, or hand: Mild  During the past week, how much difficulty have you had sleeping because of the pain in your arm, shoulder or hand?: No difficulty  Number of Questions Answered: 11  Quick DASH Score: 43.18         Time Calculation:   OT Start Time: 1030  OT Stop Time: 1055  OT Time Calculation (min): 25 min     Therapy Charges for Today       Code Description Service Date Service Provider Modifiers Qty    88661695661  OT RE-EVAL 2 8/7/2024 Diana Richmond OTR GO 1                       JESSY Medina  8/7/2024

## 2024-08-07 NOTE — THERAPY TREATMENT NOTE
Outpatient Physical Therapy Neuro Progress Note   [place]     Patient Name: Delphine King  : 1985  MRN: [id number]  Today's Date: 2024      Visit Date: 2024    Visit Dx:    ICD-10-CM ICD-9-CM   1. MMN (multifocal motor neuropathy) ([facility]): Receieved IVIG  G61.82 357.89       Patient Active Problem List   Diagnosis    Rectovaginal fistula: s/p repair in 2016    History of pulmonary embolism: lifelong anticoagulation    MMN (multifocal motor neuropathy)    Morbid (severe) obesity due to excess calories (*specify comorbidity)                        PT Assessment/Plan       Row Name 24 1556          PT Assessment    Functional Limitations Decreased safety during functional activities;Impaired gait;Limitation in home management;Limitations in community activities;Performance in leisure activities;Performance in self-care ADL  -JV     Impairments Balance;Coordination;Endurance;Gait;Impaired muscle endurance;Motor function;Muscle strength  -JV     Assessment Comments The pt presents for tx and re-assessment this date, reports she got Bulgarian knee cage about a week ago. Pt states brace is really helping her gait stability and amb on stairs. Pt reports fatigue in L LE muscles was encouraged to gradually build of brace wear time. [place] estim was applied to L quads and pt performed SAQ's x 5 min as a trial. While getting up from plinth, pt experienced sharp pain in L/S area. Pt needed assist to transfer from plinth to chair, pain was not alleviated with sitting. MH and IFC was applied to low back x 15 min with almost complete resolution of LBP. Pt was unable to participate with functional testing this date, will complete assessments at next scheduled visit.  -JV     Rehab Potential Good  -JV     Patient/caregiver participated in establishment of treatment plan and goals Yes  -JV        PT Plan    PT Plan Comments Cont plan, assess as needed.  -JV               User Key  (r) = Recorded By, (t) =  "Taken By, (c) = Cosigned By      Initials Name Provider Type    Cleopatra Glynn PT Physical Therapist                      Modalities       Row Name 08/07/24 1130 08/07/24 1115          Precautions    Existing Precautions/Restrictions fall  -JV --        Subjective    Subjective Comments Pt reports Swedish knee cage was obtained about a week ago. Pt reports it is really helping stabilize her leg when walking and doing stairs.  -JV --        Moist Heat    MH Applied Yes  -JV --     Location low back  -JV --     PT Moist Heat Minutes 15  -JV --     MH S/P Rx Yes  -JV --        ELECTRICAL STIMULATION    Attended/Unattended Unattended  -JV Unattended  -JV     Stimulation Type IFC  -JV Gambian  -JV     Max mAmp 20  -JV 20  -JV     Location/Electrode Placement/Other low back  -JV L quads  -JV     PT E-Stim Unattended Minutes 15  -JV 5  -JV               User Key  (r) = Recorded By, (t) = Taken By, (c) = Cosigned By      Initials Name Provider Type    Cleopatra Glynn PT Physical Therapist                   OP Exercises       Row Name 08/07/24 1130             Precautions    Existing Precautions/Restrictions fall  -JV         Subjective    Subjective Comments Pt reports Swedish knee cage was obtained about a week ago. Pt reports it is really helping stabilize her leg when walking and doing stairs.  -JV         Exercise 1    Exercise Name 1 Pt was positioned in hooklying on plinth with roll under Oj knees. [place] estim was applied to L quads and pt performed SAQ's x 5 min with good vishal. Upon rolling R and moving from sidelying to sitting, pt reported sharp pain in low back and said she was \"stuck\". Pt was assisted to upright sitting and climbed down from plinth using step stool with handle. Pt sat in chair briefly, but pain did not improve. Pt with no changes in sensation or motor control, but central LBP with some radiation into Oj hips. Pt was placed on MH with IFC x 15 min and reports almost complete resolution " of pain. Pt left clinic ambulating at baseline, encouraged to go to ED if any loss of motor control/sensation or bowel/bladder control occured. Pt verbalized understanding of instructions. Pt was unable to participate in assessment tasks planned for this session. Will complete at next visit.  -MARLENA                User Key  (r) = Recorded By, (t) = Taken By, (c) = Cosigned By      Initials Name Provider Type    Cleopatra Glynn, SARAH Physical Therapist                                    Therapy Education  Education Details: Gradually build up wear time of new knee brace, cont HEP.  Given: HEP  Program: New, Reinforced  How Provided: Verbal  Provided to: Patient  Level of Understanding: Teach back education performed, Verbalized              Time Calculation:   Start Time: 1100  Stop Time: 1200  Time Calculation (min): 60 min  Untimed Charges  PT E-Stim Unattended Minutes: 15  PT Moist Heat Minutes: 15  Total Minutes  Untimed Charges Total Minutes: 30   Total Minutes: 30   Therapy Charges for Today       Code Description Service Date Service Provider Modifiers Qty    59786357438 HC PT ELECTRICAL STIM UNATTENDED 8/7/2024 Cleopatra Hoffmann, PT  1    31428529214 HC PT THER PROC EA 15 MIN 8/7/2024 Cleopatra Hoffmann, PT GP 1    14077703346 HC PT HOT/COLD PACK WC NONMCARE 8/7/2024 Cleopatra Hoffmann, PT GP 1                      Cleopatra Hoffmann PT  8/7/2024

## 2024-08-12 ENCOUNTER — OFFICE VISIT (OUTPATIENT)
Dept: OBSTETRICS AND GYNECOLOGY | Facility: CLINIC | Age: 39
End: 2024-08-12
Payer: COMMERCIAL

## 2024-08-12 VITALS
HEIGHT: 67 IN | WEIGHT: 273 LBS | BODY MASS INDEX: 42.85 KG/M2 | DIASTOLIC BLOOD PRESSURE: 76 MMHG | SYSTOLIC BLOOD PRESSURE: 110 MMHG

## 2024-08-12 DIAGNOSIS — Z01.419 SMEAR, VAGINAL, AS PART OF ROUTINE GYNECOLOGICAL EXAMINATION: Primary | ICD-10-CM

## 2024-08-12 DIAGNOSIS — Z11.3 ROUTINE SCREENING FOR STI (SEXUALLY TRANSMITTED INFECTION): ICD-10-CM

## 2024-08-12 DIAGNOSIS — Z12.72 SMEAR, VAGINAL, AS PART OF ROUTINE GYNECOLOGICAL EXAMINATION: Primary | ICD-10-CM

## 2024-08-12 DIAGNOSIS — Z01.419 ROUTINE GYNECOLOGICAL EXAMINATION: ICD-10-CM

## 2024-08-12 LAB
B-HCG UR QL: NEGATIVE
BILIRUB BLD-MCNC: NEGATIVE MG/DL
CLARITY, POC: CLEAR
COLOR UR: YELLOW
EXPIRATION DATE: NORMAL
GLUCOSE UR STRIP-MCNC: NEGATIVE MG/DL
INTERNAL NEGATIVE CONTROL: NORMAL
INTERNAL POSITIVE CONTROL: NORMAL
KETONES UR QL: NEGATIVE
LEUKOCYTE EST, POC: NEGATIVE
Lab: NORMAL
NITRITE UR-MCNC: NEGATIVE MG/ML
PH UR: 5 [PH] (ref 5–8)
PROT UR STRIP-MCNC: NEGATIVE MG/DL
RBC # UR STRIP: NEGATIVE /UL
SP GR UR: 1 (ref 1–1.03)
UROBILINOGEN UR QL: NORMAL

## 2024-08-12 PROCEDURE — 81002 URINALYSIS NONAUTO W/O SCOPE: CPT | Performed by: NURSE PRACTITIONER

## 2024-08-12 PROCEDURE — 99395 PREV VISIT EST AGE 18-39: CPT | Performed by: NURSE PRACTITIONER

## 2024-08-12 PROCEDURE — 81025 URINE PREGNANCY TEST: CPT | Performed by: NURSE PRACTITIONER

## 2024-08-12 PROCEDURE — 1159F MED LIST DOCD IN RCRD: CPT | Performed by: NURSE PRACTITIONER

## 2024-08-12 PROCEDURE — 1160F RVW MEDS BY RX/DR IN RCRD: CPT | Performed by: NURSE PRACTITIONER

## 2024-08-12 RX ORDER — FEXOFENADINE HCL 180 MG/1
180 TABLET ORAL DAILY
COMMUNITY
Start: 2023-11-27 | End: 2024-11-26

## 2024-08-12 RX ORDER — EPINEPHRINE 0.3 MG/.3ML
INJECTION SUBCUTANEOUS
COMMUNITY
Start: 2024-06-10

## 2024-08-12 RX ORDER — LAMOTRIGINE 25 MG/1
50 TABLET ORAL
COMMUNITY
Start: 2024-02-20

## 2024-08-12 RX ORDER — ACETAMINOPHEN 160 MG
2000 TABLET,DISINTEGRATING ORAL DAILY
COMMUNITY

## 2024-08-12 NOTE — PROGRESS NOTES
GYN Annual Exam     Chief Complaint   Patient presents with    Gynecologic Exam     Annual       Delphine King is a 38 y.o. female who presents for annual well woman exam. She is new to me - no. She is sexually active. She is s/p hysterectomy- still has partial cervix and both OV.    Episode of bleeding x 1 two years ago from vaginal area.  Went to colorectal surgeon at [facility]; said bleeding was from her cervix.  Colonoscopy was neg; +polyps; gets c-scope every 5 years.  Performing SBE: yes     Hx of rare autoimmune disorder- MMN; gets IVIG monthly. Hx of PE & DVT- on Xeralto.  Hx of rectovaginal fistula in 2016- s/p 6 surgeries to repair    HPI    OB History          3    Para   1    Term   1       0    AB   2    Living   1         SAB   2    IAB   0    Ectopic   0    Molar   0    Multiple   0    Live Births   1                Last Pap: 2023- NIL  Last Mammogram: N/A; due at 40 w.o.  Last Colonoscopy: 2023- polyp removed  History of abnormal Pap smear: no  Family history of uterine, colon or ovarian cancer: no  Family history of breast cancer: yes - maternal aunt in her 40's; pt had genetic cancer testing- neg   History of abnormal mammogram: N/A  Gardasil Vaccine: unsure    Past Medical History:   Diagnosis Date    Anxiety     Asthma     Cholelithiasis     Colon polyp     Deep vein thrombosis     Depression     Fissure, anal     Hyperlipidemia     Kidney stones     MMN (multifocal motor neuropathy)     Neuropathy     PE (pulmonary thromboembolism) 2017    Stroke        Past Surgical History:   Procedure Laterality Date    ANAL FISTULA REPAIR      multiple repairs     SECTION      CHOLECYSTECTOMY WITH INTRAOPERATIVE CHOLANGIOGRAM N/A 2023    Procedure: CHOLECYSTECTOMY LAPAROSCOPIC INTRAOPERATIVE CHOLANGIOGRAM;  Surgeon: Goldie Mandujano DO;  Location: [facility];  Service: General;  Laterality: N/A;    ENDOSCOPY N/A 2023    Procedure: ESOPHAGOGASTRODUODENOSCOPY  WITH COLD BX'S;  Surgeon: Mitch Barclay MD;  Location: [facility] ENDOSCOPY;  Service: Gastroenterology;  Laterality: N/A;  pre: abdominal pain  post: gastritis    ERCP N/A 04/13/2023    Procedure: ENDOSCOPIC RETROGRADE CHOLANGIOPANCREATOGRAPHY WITH SPHINCTEROTOMY AND BALOON SWEEP;  Surgeon: Mitch Barclay MD;  Location: [facility] ENDOSCOPY;  Service: Gastroenterology;  Laterality: N/A;  pre: biliary obstruction, choledocholithiasis  post: choledocholithiasis    HYSTERECTOMY  2017    PORTACATH PLACEMENT Right     power port    TONSILLECTOMY  2003    WISDOM TOOTH EXTRACTION  2003         Current Outpatient Medications:     albuterol sulfate  (90 Base) MCG/ACT inhaler, Inhale 2 puffs Every 6 (Six) Hours As Needed for Wheezing., Disp: , Rfl:     atorvastatin (LIPITOR) 20 MG tablet, Take 1 tablet by mouth Every Night., Disp: , Rfl:     buPROPion XL (WELLBUTRIN XL) 300 MG 24 hr tablet, Take 1 tablet by mouth Daily., Disp: , Rfl:     chlorhexidine (PERIDEX) 0.12 % solution, , Disp: , Rfl:     Cholecalciferol (Vitamin D3) 50 MCG (2000 UT) capsule, Take 1 capsule by mouth Daily., Disp: , Rfl:     DULoxetine (CYMBALTA) 30 MG capsule, Take 1 capsule by mouth Daily., Disp: , Rfl:     EPINEPHrine (EPIPEN) 0.3 MG/0.3ML solution auto-injector injection, , Disp: , Rfl:     fexofenadine (ALLEGRA) 180 MG tablet, Take 1 tablet by mouth Daily., Disp: , Rfl:     gabapentin (NEURONTIN) 100 MG capsule, Take 2 capsules by mouth., Disp: , Rfl:     hydrOXYzine (ATARAX) 25 MG tablet, Take 1 tablet by mouth At Night As Needed for Itching., Disp: , Rfl:     hydrOXYzine pamoate (VISTARIL) 25 MG capsule, , Disp: , Rfl:     ibuprofen (ADVIL,MOTRIN) 800 MG tablet, , Disp: , Rfl:     lamoTRIgine (LaMICtal) 25 MG tablet, 2 tablets., Disp: , Rfl:     multivitamin with minerals tablet tablet, Take 1 tablet by mouth Daily., Disp: , Rfl:     ondansetron ODT (ZOFRAN-ODT) 4 MG disintegrating tablet, Take 1 tablet by mouth., Disp: , Rfl:     pantoprazole  "(PROTONIX) 40 MG EC tablet, Take 1 tablet by mouth Daily., Disp: , Rfl:     Panzyga 10 GM/100ML solution infusion, , Disp: , Rfl:     rivaroxaban (XARELTO) 10 MG tablet, Take 1 tablet by mouth Daily., Disp: , Rfl:     solifenacin (VESICARE) 10 MG tablet, Take 1 tablet by mouth Daily., Disp: , Rfl:     cetirizine (zyrTEC) 10 MG tablet, Take 1 tablet by mouth Daily As Needed for Allergies. (Patient not taking: Reported on 8/12/2024), Disp: , Rfl:     gabapentin (NEURONTIN) 400 MG capsule, Take 200 mg by mouth., Disp: , Rfl:     Allergies   Allergen Reactions    Other Anaphylaxis and Hives     Seafood     Seafood    Penicillins Hives and Shortness Of Breath    Pineapple Hives, Anaphylaxis, Itching and Shortness Of Breath    Pseudoeph-Doxylamine-Dm-Apap Hives    Shellfish-Derived Products Anaphylaxis, Hives, Itching and Shortness Of Breath    Menthol Rash    Methyl Salicylate Rash    Vancomycin Itching and Hives     ITCHY     ITCHY    ITCHY    Fish-Derived Products Other (See Comments)     ANAPHYLAXIS seafoods as well. IVdye ok.    Icy Hot Other (See Comments)       Social History     Tobacco Use    Smoking status: Every Day     Current packs/day: 0.50     Types: Cigarettes    Smokeless tobacco: Never   Vaping Use    Vaping status: Former    Substances: Nicotine, THC, Flavoring    Devices: Disposable   Substance Use Topics    Alcohol use: Not Currently    Drug use: Not Currently     Types: Marijuana       Family History   Problem Relation Age of Onset    Depression Mother     Hypertension Mother     Breast cancer Maternal Aunt         dx'd in her 40's    Ovarian cancer Neg Hx     Uterine cancer Neg Hx     Colon cancer Neg Hx        Review of Systems   Endocrine: Negative for heat intolerance.   Genitourinary:  Negative for breast discharge, breast lump, breast pain and vaginal bleeding.       Yes- tested for chlamydia    /76   Ht 170.2 cm (67.01\")   Wt 124 kg (273 lb)   BMI 42.75 kg/m²     Physical Exam  Vitals " reviewed.   Constitutional:       General: She is awake. She is not in acute distress.     Appearance: She is obese. She is not ill-appearing.   HENT:      Head: Normocephalic and atraumatic.   Eyes:      Conjunctiva/sclera: Conjunctivae normal.   Pulmonary:      Effort: Pulmonary effort is normal. No respiratory distress.   Chest:   Breasts:     Right: Normal.      Left: Normal.   Abdominal:      Palpations: Abdomen is soft. There is no mass.      Tenderness: There is no abdominal tenderness.   Genitourinary:     General: Normal vulva.      Exam position: Supine.      Pubic Area: No rash.       Labia:         Right: No rash.         Left: No rash.       Urethra: No urethral lesion.      Vagina: Normal.      Cervix: Normal.      Uterus: Absent.       Adnexa: Right adnexa normal and left adnexa normal.       Musculoskeletal:      Cervical back: Neck supple. No rigidity.   Lymphadenopathy:      Upper Body:      Right upper body: No axillary adenopathy.      Left upper body: No axillary adenopathy.      Lower Body: No right inguinal adenopathy. No left inguinal adenopathy.   Skin:     General: Skin is warm and dry.      Capillary Refill: Capillary refill takes less than 2 seconds.   Neurological:      Mental Status: She is alert and oriented to person, place, and time.   Psychiatric:         Mood and Affect: Mood and affect normal.         Behavior: Behavior normal.            Assessment     Well woman exam   Contraception management    Plan   Well woman exam: Pap collected- Yes. Instructed on how to perform SBE. Recommend MVI daily.    Breast Health: Clinical breast exam & Self breast awareness.   Colon health:  UTD 1/2023  Contraception: s/p [place]  STD: Enc condoms. Desires STD screen today- Yes. Urine  Gardasil: unsure if she received  Smoking status:  yes I advised Delphine of the risks of continuing to use tobacco, and I provided her with tobacco cessation educational materials in the After Visit Summary. During this  visit, I spent <3 minutes counseling the patient regarding tobacco cessation.  She has cut back on usage.  Body mass index is 42.75 kg/m². Diet and exercised discussed.      Follow-up as needed and 1 year for AE.    I spent 15 minutes caring for Delphine on this date of service. This time includes time spent by me in the following activities: preparing for the visit, reviewing tests, performing a medically appropriate examination and/or evaluation, counseling and educating the patient/family/caregiver, documenting information in the medical record, and ordering test(s).        Xenia Roche, APRN  8/12/2024  10:50 EDT

## 2024-08-13 LAB
C TRACH RRNA SPEC QL NAA+PROBE: NEGATIVE
N GONORRHOEA RRNA SPEC QL NAA+PROBE: NEGATIVE
T VAGINALIS RRNA SPEC QL NAA+PROBE: NEGATIVE

## 2024-08-16 LAB
CYTOLOGIST CVX/VAG CYTO: NORMAL
CYTOLOGY CVX/VAG DOC CYTO: NORMAL
CYTOLOGY CVX/VAG DOC THIN PREP: NORMAL
DX ICD CODE: NORMAL
HPV I/H RISK 4 DNA CVX QL PROBE+SIG AMP: NEGATIVE
Lab: NORMAL
OTHER STN SPEC: NORMAL
STAT OF ADQ CVX/VAG CYTO-IMP: NORMAL

## 2024-10-02 ENCOUNTER — HOSPITAL ENCOUNTER (OUTPATIENT)
Dept: OCCUPATIONAL THERAPY | Facility: HOSPITAL | Age: 39
Setting detail: THERAPIES SERIES
Discharge: HOME | End: 2024-10-02
Payer: COMMERCIAL

## 2024-10-02 ENCOUNTER — HOSPITAL ENCOUNTER (OUTPATIENT)
Dept: PHYSICAL THERAPY | Facility: HOSPITAL | Age: 39
Setting detail: THERAPIES SERIES
Discharge: HOME | End: 2024-10-02
Payer: COMMERCIAL

## 2024-10-02 DIAGNOSIS — G61.82 MMN (MULTIFOCAL MOTOR NEUROPATHY): ICD-10-CM

## 2024-10-02 DIAGNOSIS — G61.82 MULTIFOCAL MOTOR NEUROPATHY: Primary | ICD-10-CM

## 2024-10-02 DIAGNOSIS — G61.82 MMN (MULTIFOCAL MOTOR NEUROPATHY): Primary | ICD-10-CM

## 2024-10-02 PROCEDURE — 97530 THERAPEUTIC ACTIVITIES: CPT

## 2024-10-02 PROCEDURE — 97168 OT RE-EVAL EST PLAN CARE: CPT

## 2024-10-02 NOTE — THERAPY RE-EVALUATION
Outpatient Occupational Therapy Neuro Re-Evaluation   Robyn Karimi     Patient Name: Delphine King  : 1985  MRN: [id number]  Today's Date: 10/2/2024      Visit Date: 10/02/2024    Patient Active Problem List   Diagnosis    Rectovaginal fistula: s/p repair in     History of pulmonary embolism: lifelong anticoagulation    MMN (multifocal motor neuropathy)    Morbid (severe) obesity due to excess calories (*specify comorbidity)        Past Medical History:   Diagnosis Date    Anxiety     Asthma     Cholelithiasis     Colon polyp     Deep vein thrombosis     Depression     Fissure, anal 2017    Hyperlipidemia     Kidney stones     MMN (multifocal motor neuropathy)     Neuropathy     PE (pulmonary thromboembolism) 2017    Stroke         Past Surgical History:   Procedure Laterality Date    ANAL FISTULA REPAIR      multiple repairs     SECTION      CHOLECYSTECTOMY WITH INTRAOPERATIVE CHOLANGIOGRAM N/A 2023    Procedure: CHOLECYSTECTOMY LAPAROSCOPIC INTRAOPERATIVE CHOLANGIOGRAM;  Surgeon: Goldie Mandujano DO;  Location: [facility];  Service: General;  Laterality: N/A;    ENDOSCOPY N/A 2023    Procedure: ESOPHAGOGASTRODUODENOSCOPY WITH COLD BX'S;  Surgeon: Mitch Barclay MD;  Location: [facility] ENDOSCOPY;  Service: Gastroenterology;  Laterality: N/A;  pre: abdominal pain  post: gastritis    ERCP N/A 2023    Procedure: ENDOSCOPIC RETROGRADE CHOLANGIOPANCREATOGRAPHY WITH SPHINCTEROTOMY AND BALOON SWEEP;  Surgeon: Mitch Barclay MD;  Location: [facility] ENDOSCOPY;  Service: Gastroenterology;  Laterality: N/A;  pre: biliary obstruction, choledocholithiasis  post: choledocholithiasis    HYSTERECTOMY  2017    PORTACATH PLACEMENT Right     power port    TONSILLECTOMY  2003    WISDOM TOOTH EXTRACTION  2003         Visit Dx:      ICD-10-CM ICD-9-CM   1. Multifocal motor neuropathy  G61.82 357.89   2. MMN (multifocal motor neuropathy) (HCC): Receieved IVIG  G61.82 357.89            OT Neuro        Row Name 10/02/24 1000             Subjective Comments    Subjective Comments Patient reports she continues to have difficulty with typing with right hand due to wrist drop. Reports voc rehab is trying to get dragon dictation for her.  -EN         Subjective Pain    Subjective Pain Comment No report of pain  -EN         Sensation    Additional Comments no c/o numbness or tingling  -EN         Coordination    9-Hole Peg Left 18.34  -EN      9-Hole Peg Right 18.79  -EN         Right Upper Ext    Rt Shoulder Abduction AROM WFL  -EN      Rt Shoulder Flexion AROM WFL  -EN      Rt Elbow Supination AROM WFL  -EN      Rt Elbow Pronation AROM WFL  -EN      Rt Wrist Flexion AROM WFL  -EN      Rt Wrist Extension AROM history of right wrist drop and limited finger extension in digits 3, 4, and 5.  -EN         Left Upper Ext    Lt Shoulder Abduction AROM WFL  -EN      Lt Shoulder Flexion AROM WFL  -EN      Lt Elbow Extension/Flexion AROM WFL  -EN      Lt Elbow Supination AROM WFL  -EN      Lt Elbow Pronation AROM WFL  -EN      Lt Wrist Flexion AROM WFL  -EN      Lt Wrist Extension AROM WFL  -EN         MMT Right Upper Ext    Rt Shoulder Flexion MMT, Gross Movement (4+/5) good plus  -EN      Rt Shoulder Extension MMT, Gross Movement (4+/5) good plus  -EN      Rt Shoulder ABduction MMT, Gross Movement (4+/5) good plus  -EN      Rt Shoulder ADduction MMT, Gross Movement (4+/5) good plus  -EN      Rt Elbow Flexion MMT, Gross Movement: (5/5) normal  -EN      Rt Elbow Extension MMT, Gross Movement: (5/5) normal  -EN      Rt Forearm Supination MMT, Gross Movement (5/5) normal  -EN      Rt Forearm Pronation MMT, Gross Movement (5/5) normal  -EN      Rt Wrist Flexion MMT, Gross Movement (3+/5) fair plus  -EN      Rt Wrist Extension MMT, Gross Movement (3/5) fair  -EN         MMT Left Upper Ext    Lt Shoulder Flexion MMT, Gross Movement (5/5) normal  -EN      Lt Shoulder Extension MMT, Gross Movement (5/5) normal  -EN      Lt  Shoulder ABduction MMT, Gross Movement (5/5) normal  -EN      Lt Shoulder ADduction MMT, Gross Movement (5/5) normal  -EN      Lt Elbow Flexion MMT, Gross Movement (5/5) normal  -EN      Lt Elbow Extension MMT, Gross Movement (5/5) normal  -EN      Lt Forearm Supination MMT, Gross Movement (5/5) normal  -EN      Lt Forearm Pronation MMT, Gross Movement (5/5) normal  -EN      Lt Wrist Flexion MMT, Gross Movement (5/5) normal  -EN      Lt Wrist Extension MMT, Gross Movement (5/5) normal  -EN         Functional Mobility    Functional Mobility- Comment walking without an AD  -EN         ADL Assessment/Intervention    Comment, IADL Assessment/Training continues to be modified independent with ADLs/IADls. Continues to use built up utensils/pens  -EN                User Key  (r) = Recorded By, (t) = Taken By, (c) = Cosigned By      Initials Name Provider Type    Diana Grimes OTR Occupational Therapist                    Hand Therapy (Last 24 Hours)       Hand Eval       Row Name 10/02/24 1100              Strength Right    # Reps 3  -EN      Right Rung 2  -EN      Right  Test 1 30  -EN      Right  Test 2 27  -EN      Right  Test 3 27  -EN       Strength Average Right 28  -EN          Strength Left    # Reps 3  -EN      Left Rung 2  -EN      Left  Test 1 66  -EN      Left  Test 2 71  -EN      Left  Test 3 62  -EN       Strength Average Left 66.33  -EN         Right Hand Strength - Pinch (lbs)    Lateral 6.33 lbs  7,6,6  -EN      Tip (3 point) 7.33 lbs  8,6,8  -EN         Left Hand Strength - Pinch (lbs)    Lateral 12.67 lbs  12,14,12  -EN      Tip (3 point) 15.67 lbs  17,16,14  -EN         Therapy Education    Given HEP  -EN      Program Reinforced  -EN      How Provided Verbal  -EN      Provided to Patient  -EN      Level of Understanding Verbalized;Demonstrated;Teach back education performed  -EN                User Key  (r) = Recorded By, (t) = Taken By, (c) = Cosigned  By      Initials Name Provider Type    Diana Grimes OTR Occupational Therapist                        Therapy Education  Given: HEP  Program: Reinforced  How Provided: Verbal  Provided to: Patient  Level of Understanding: Verbalized, Demonstrated, Teach back education performed     OT Goals       Row Name 10/02/24 1100          OT Short Term Goals    STG Date to Achieve 12/13/23  -EN     STG 1 Patient will demonstrate improved right  strength by 3# for improved grasp of objects. (Currently 25# pounds)  -EN     STG 1 Progress Met  -EN     STG 2 Patient will report modified independence with ADL/IADL routine (compensatory strategies, adaptive equipment, etc..)  -EN     STG 2 Progress Met  -EN        Long Term Goals    LTG Date to Achieve 12/04/24  -EN     LTG 1 Patient will demonstrate improved fine motor coordination evidenced by a 5 second improved 9 hole peg score (right hand)  -EN     LTG 1 Progress Met  -EN     LTG 2 Patient will demonstrate improved right hand  strength by 20 pounds for improved grasp of objects.(30#)  -EN     LTG 2 Progress --  discontinue goal  -EN     LTG 3 Patient will improve Quick Dash score 50 points.  -EN     LTG 3 Progress Ongoing;Progressing  -EN     LTG 3 Progress Comments has improved 25 points  -EN               User Key  (r) = Recorded By, (t) = Taken By, (c) = Cosigned By      Initials Name Provider Type    Diana Grimes OTR Occupational Therapist                            OT Assessment/Plan       Row Name 10/02/24 1126          OT Assessment    Assessment Comments Patient reports she continues to receive IVIG infusions every 4 weeks and has one next week. Patient reports she is not having pain, just fatigue. Patient continues to use compensatory strategies/adaptive equipment for ADL/IADL independence.Patient also states voc rehab is trying to get dragon dictation for her at work due to difficulty typing with the right hand. Patient had no  significant changes in hand coordination however right and left  strength improved significantly. Measurements are as follows:  Right  increased from 23.33 pounds to 28 pounds.  L  increased from 56.67 pounds to 66.33 pounds.  R lateral pinch decreased from 6.67 pounds to 6.33 and right 3 point pinch increased from 6 pounds to 7.33 pounds.  Left lateral pinch increased from 12.33 pounds to 12.67 pounds and left 3 point pinch decreased from 16 pounds to 15.67  pounds.  Right hand coordination  (9 Hole Peg score) time improved from 19.95 seconds to 18.79 seconds and left hand coordination time declined slightly from 17.37 seconds to 18.34 seconds. Patient is doing well with HEP and is pain free.  -EN     OT Diagnosis MMN, decreased B UE funcitonal use, especially R UE  -EN        OT Plan    OT Frequency --  reassessment every 4 weeks  -EN     Predicted Duration of Therapy Intervention (OT) 6 months  -EN     Planned CPT's? OT EVAL LOW COMPLEXITY: 52781  -EN     Planned Therapy Interventions (Optional Details) home exercise program;patient/family education;strengthening  -EN     OT Plan Comments Continue with plan of care.  -EN               User Key  (r) = Recorded By, (t) = Taken By, (c) = Cosigned By      Initials Name Provider Type    Dinaa Grimes, OTR Occupational Therapist                        9 Hole Peg  9-Hole Peg Left: 18.34  9-Hole Peg Right: 18.79  Quick DASH  Open a tight or new jar.: Moderate Difficulty  Do heavy household chores (e.g., wash walls, wash floors): Severe Difficulty  Carry a shopping bag or briefcase: Mild Difficulty  Wash your back: Unable  Use a knife to cut food: Moderate Difficulty  Recreational activities in which you take some force or impact through your arm, should or hand (e.g. golf, hammering, tennis, etc.): Unable  During the past week, to what extent has your arm, shoulder, or hand problem interfered with your normal social activites with family, friends,  neighbors or groups?: Slightly  During the past week, were you limited in your work or other regular daily activities as a result of your arm, shoulder or hand problem?: Slightly Limited  Arm, Shoulder, or hand pain: Mild  Tingling (pins and needles) in your arm, shoulder, or hand: Mild  During the past week, how much difficulty have you had sleeping because of the pain in your arm, shoulder or hand?: No difficulty  Number of Questions Answered: 11  Quick DASH Score: 45.45         Time Calculation:   OT Start Time: 1027  OT Stop Time: 1050  OT Time Calculation (min): 23 min     Therapy Charges for Today       Code Description Service Date Service Provider Modifiers Qty    [id number]  OT RE-EVAL 2 10/2/2024 Diana Richmond, OTR  1                       Diana Richmond, JESSY  10/2/2024

## 2024-10-02 NOTE — THERAPY PROGRESS REPORT/RE-CERT
Outpatient Physical Therapy Neuro Progress Note   [place]     Patient Name: Delphine King  : 1985  MRN: [id number]  Today's Date: 10/2/2024      Visit Date: 10/02/2024    Visit Dx:    ICD-10-CM ICD-9-CM   1. MMN (multifocal motor neuropathy) ([facility]): Receieved IVIG  G61.82 357.89       Patient Active Problem List   Diagnosis    Rectovaginal fistula: s/p repair in 2016    History of pulmonary embolism: lifelong anticoagulation    MMN (multifocal motor neuropathy)    Morbid (severe) obesity due to excess calories (*specify comorbidity)                        PT Assessment/Plan       Row Name 10/02/24 1451          PT Assessment    Functional Limitations Decreased safety during functional activities;Impaired gait;Limitation in home management;Limitations in community activities;Performance in leisure activities;Performance in self-care ADL  -JV     Impairments Balance;Coordination;Endurance;Gait;Impaired muscle endurance;Motor function;Muscle strength  -JV     Assessment Comments The pt presents for tx and re-assessment this date, reports she is able to wear knee brace for about 4 hours/day. The pt was assessed using the 5XSTS, TUG, and LEFS. Time on the 5XSTS improved from 25.83 to 19.84 sec, TUG avg improved from 10.62 to 9.43 sec, and LEFS score improved from 28 to 37 points. Pt is just returning to baseline scores obtained in 3/24 prior to trial of moving IVIG infusions to 5 week intervals in , which resulted in significant decline in performance on all assessment measures. The pt has met STG #2 and LTG #1, STG #1 is ongoing for changes to HEP. LTG's #2-3 are partially met.  -JV     Rehab Potential Good  -JV     Patient/caregiver participated in establishment of treatment plan and goals Yes  -JV        PT Plan    PT Plan Comments Cont plan, assess as needed.  -JV               User Key  (r) = Recorded By, (t) = Taken By, (c) = Cosigned By      Initials Name Provider Type    Cleopatra Glynn, PT  Physical Therapist                        OP Exercises       Row Name 10/02/24 1100             Precautions    Existing Precautions/Restrictions fall  -JV         Subjective    Subjective Comments Pt reports she is wearing knee brace about 4 hours/day.  -JV         Exercise 1    Exercise Name 1 Pt was assessed with the TUG, 5XSTS and LEFS.  -JV                User Key  (r) = Recorded By, (t) = Taken By, (c) = Cosigned By      Initials Name Provider Type    Cleopatra Glynn PT Physical Therapist                                 PT OP Goals       Row Name 10/02/24 1100          PT Short Term Goals    STG Date to Achieve 09/08/23  -JV     STG 1 Pt will be I with HEP  -JV     STG 1 Progress Ongoing  -JV     STG 2 Admin DGI at next visit  -JV     STG 2 Progress Met  -JV        Long Term Goals    LTG Date to Achieve 11/01/24  -JV     LTG 1 Pt will amb up/down steps with reciprocal pattern and 1 HR or cane.  -JV     LTG 1 Progress Met  -JV     LTG 2 Pt will improve 5XSST time to < 16.5 sec.  -JV     LTG 2 Progress Progressing  -JV     LTG 3 Pt will increase Oj LE strength to 4/5  -JV     LTG 3 Progress Progressing  -JV        Time Calculation    PT Goal Re-Cert Due Date 11/01/24  -JV               User Key  (r) = Recorded By, (t) = Taken By, (c) = Cosigned By      Initials Name Provider Type    Cleopatra Glynn PT Physical Therapist                    Therapy Education  Education Details: Cont HEP  Given: HEP  Program: Reinforced  How Provided: Verbal  Provided to: Patient  Level of Understanding: Verbalized, Teach back education performed    Outcome Measure Options: 5x Sit to Stand, Lower Extremity Functional Scale (LEFS), Timed Up and Go (TUG)  5 Times Sit to Stand  5 Times Sit to Stand (seconds): 19.84 seconds  5 Times Sit to Stand Comments: Previous time 25.83  Lower Extremity Functional Index  Any of your usual work, housework or school activities: A little bit of difficulty  Your usual hobbies, recreational or  sporting activities: Moderate difficulty  Getting into or out of the bath: A little bit of difficulty  Walking between rooms: No difficulty  Putting on your shoes or socks: A little bit of difficulty  Squatting: Extreme difficulty or unable to perform activity  Lifting an object, like a bag of groceries from the floor: A little bit of difficulty  Performing light activities around your home: A little bit of difficulty  Performing heavy activities around your home: Quite a bit of difficulty  Getting into or out of a car: Moderate difficulty  Walking 2 blocks: A little bit of difficulty  Walking a mile: Extreme difficulty or unable to perform activity  Going up or down 10 stairs (about 1 flight of stairs): Moderate difficulty  Standing for 1 hour: A little bit of difficulty  Sitting for 1 hour: A little bit of difficulty  Running on even ground: Extreme difficulty or unable to perform activity  Running on uneven ground: Extreme difficulty or unable to perform activity  Making sharp turns while running fast: Extreme difficulty or unable to perform activity  Hopping: Extreme difficulty or unable to perform activity  Rolling over in bed: Moderate difficulty  Total: 37  Timed Up and Go (TUG)  TUG Test 1: 8.78 seconds  TUG Test 2: 10.09 seconds  Timed Up and Go Comments: Second attempt with dual task of counting backward by 3      Time Calculation:   Start Time: 1100  Stop Time: 1130  Time Calculation (min): 30 min   Therapy Charges for Today       Code Description Service Date Service Provider Modifiers Qty    16607679485  PT THERAPEUTIC ACT EA 15 MIN 10/2/2024 Cleopatra Hoffmann, PT GP 2            PT G-Codes  Outcome Measure Options: 5x Sit to Stand, Lower Extremity Functional Scale (LEFS), Timed Up and Go (TUG)  Total: 37  TUG Test 1: 8.78 seconds  TUG Test 2: 10.09 seconds         Cleopatra Hoffmann, PT  10/2/2024

## 2024-10-30 ENCOUNTER — APPOINTMENT (OUTPATIENT)
Dept: OCCUPATIONAL THERAPY | Facility: HOSPITAL | Age: 39
End: 2024-10-30
Payer: COMMERCIAL

## 2024-10-30 ENCOUNTER — APPOINTMENT (OUTPATIENT)
Dept: PHYSICAL THERAPY | Facility: HOSPITAL | Age: 39
End: 2024-10-30
Payer: COMMERCIAL

## 2024-10-31 ENCOUNTER — OFFICE VISIT (OUTPATIENT)
Dept: OBSTETRICS AND GYNECOLOGY | Facility: CLINIC | Age: 39
End: 2024-10-31
Payer: COMMERCIAL

## 2024-10-31 VITALS
BODY MASS INDEX: 42.85 KG/M2 | SYSTOLIC BLOOD PRESSURE: 136 MMHG | WEIGHT: 273 LBS | DIASTOLIC BLOOD PRESSURE: 72 MMHG | HEIGHT: 67 IN

## 2024-10-31 DIAGNOSIS — N75.8 BARTHOLIN'S GLAND INFECTION: Primary | ICD-10-CM

## 2024-10-31 NOTE — PROGRESS NOTES
"Subjective     Chief Complaint   Patient presents with    Follow-up       Delphine King is a 38 y.o.  whose LMP is No LMP recorded. Patient has had a hysterectomy.. This patient is new to me- no.  She presents with possible bartholin    HPI    She felt tenderness 2 days ago and hole where she had a Bartholin's drained several years ago.  Her partner looked and noticed the hole as well; has white/milky discharge.  She denies pain/redness or swelling.          The following portions of the patient's history were reviewed and updated as appropriate:vital signs, allergies, current medications, past medical history, past social history, past surgical history, and problem list      Review of Systems     Review of Systems   Genitourinary:  Positive for vaginal pain. Negative for vaginal bleeding and vaginal discharge.       Objective      /72   Ht 170.2 cm (67.01\")   Wt 124 kg (273 lb)   BMI 42.75 kg/m²     Physical Exam    Physical Exam  Vitals reviewed.   Constitutional:       General: She is awake. She is not in acute distress.     Appearance: She is obese. She is not ill-appearing.   Eyes:      Conjunctiva/sclera: Conjunctivae normal.   Pulmonary:      Effort: Pulmonary effort is normal. No respiratory distress.   Genitourinary:     Exam position: Supine.       Musculoskeletal:      Cervical back: Neck supple. No rigidity.   Skin:     General: Skin is warm and dry.      Capillary Refill: Capillary refill takes less than 2 seconds.   Neurological:      Mental Status: She is alert and oriented to person, place, and time.   Psychiatric:         Mood and Affect: Mood and affect normal.         Behavior: Behavior normal.           Lab Review   Labs: No data reviewed     Imaging: No data reviewed      Assessment/Plan  Diagnoses and all orders for this visit:    1. History of bartholin's gland infection with marsupilization (Primary)      Punctum noted on exam with no sign of abscess.  Discussed with  " Per/Dr. Soares; states it is a normal finding after a Bartholin's excision.  Discussed warning signs with patient and reasons to call the office.      Return if symptoms worsen or fail to improve.    I spent 20 minutes caring for Delphine on this date of service. This time includes time spent by me in the following activities: preparing for the visit, performing a medically appropriate examination and/or evaluation, counseling and educating the patient/family/caregiver, documenting information in the medical record, and consulting with physicians in the practice .     Xenia Roche, APRN  10/31/2024  12:22 EDT

## 2024-12-03 ENCOUNTER — DOCUMENTATION (OUTPATIENT)
Dept: OCCUPATIONAL THERAPY | Facility: HOSPITAL | Age: 39
End: 2024-12-03
Payer: COMMERCIAL

## 2024-12-03 DIAGNOSIS — G61.82 MMN (MULTIFOCAL MOTOR NEUROPATHY): ICD-10-CM

## 2024-12-03 DIAGNOSIS — G61.82 MULTIFOCAL MOTOR NEUROPATHY: Primary | ICD-10-CM

## 2024-12-03 NOTE — THERAPY DISCHARGE NOTE
Outpatient Occupational Therapy Discharge Summary         Patient Name: Delphine King  : 1985  MRN: [id number]    Today's Date: 12/3/2024      Visit Date: 2024       OT Goals       Row Name 24 1000          OT Short Term Goals    STG Date to Achieve 23  -EN     STG 1 Patient will demonstrate improved right  strength by 3# for improved grasp of objects. (Currently 25# pounds)  -EN     STG 1 Progress Met  -EN     STG 2 Patient will report modified independence with ADL/IADL routine (compensatory strategies, adaptive equipment, etc..)  -EN     STG 2 Progress Met  -EN        Long Term Goals    LTG Date to Achieve 24  -EN     LTG 1 Patient will demonstrate improved fine motor coordination evidenced by a 5 second improved 9 hole peg score (right hand)  -EN     LTG 1 Progress Met  -EN     LTG 2 Patient will demonstrate improved right hand  strength by 20 pounds for improved grasp of objects.(30#)  -EN     LTG 2 Progress --  discontinue goal  -EN     LTG 3 Patient will improve Quick Dash score 50 points.  -EN     LTG 3 Progress Partially Met  -EN               User Key  (r) = Recorded By, (t) = Taken By, (c) = Cosigned By      Initials Name Provider Type    Diana Grimes OTR Occupational Therapist                     OP OT Discharge Summary  Reason for Discharge: Maximum functional potential achieved  Outcomes Achieved: Patient able to partially acheive established goals  Discharge Destination: Home with home program  Discharge Instructions: Continue with plan of care.      Time Calculation:                         JESSY Medina   12/3/2024

## 2025-07-11 ENCOUNTER — TELEMEDICINE (OUTPATIENT)
Age: 40
End: 2025-07-11
Payer: COMMERCIAL

## 2025-07-11 DIAGNOSIS — F33.2 SEVERE EPISODE OF RECURRENT MAJOR DEPRESSIVE DISORDER, WITHOUT PSYCHOTIC FEATURES: Primary | ICD-10-CM
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
## 2025-07-15 ENCOUNTER — TELEMEDICINE (OUTPATIENT)
Age: 40
End: 2025-07-15
Payer: COMMERCIAL

## 2025-07-15 DIAGNOSIS — F33.2 SEVERE EPISODE OF RECURRENT MAJOR DEPRESSIVE DISORDER, WITHOUT PSYCHOTIC FEATURES: Primary | ICD-10-CM

## 2025-07-15 NOTE — PROGRESS NOTES
Progress Note     Date: 07/15/2025   Client Name: Delphine King   MRN: [id number]   Start Time:    1006  End Time:    1056    Diagnoses and all orders for this visit:    1. Severe episode of recurrent major depressive disorder, without psychotic features (Primary)          Active Symptoms/Chief Complainant:   Client presented for session via telehealth. Client reported depression symptoms including: tearfulness, depressed mood and loss of interest. Client discussed tx plan goals and is willing to start EMDR next session. Client is encouraged to come back in one week.      MENTAL STATUS EXAM   General Appearance:  Cleanly groomed and dressed  Eye Contact:  Good eye contact  Attitude:  Cooperative  Motor Activity:  Normal gait, posture  Muscle Strength:  Normal  Speech:  Normal rate, tone, volume  Language:  Stereotypical  Mood and affect:  Depressed  Hopelessness:  Denies  Loneliness: Denies  Thought Process:  Logical  Associations/ Thought Content:  No delusions  Hallucinations:  None  Suicidal Ideations:  Not present  Homicidal Ideation:  Not present  Sensorium:  Alert and clear  Orientation:  Person, place, time and situation  Immediate Recall, Recent, and Remote Memory:  Intact  Attention Span/ Concentration:  Good  Fund of Knowledge:  Appropriate for age and educational level  Intellectual Functioning:  Above average  Insight:  Good  Judgement:  Good  Reliability:  Good  Impulse Control:  Good       Care Plan:  Tx plan:   Client wants to process trauma and develop healthier communication styles.     Client will start phase one of EMDR within one month.   Client will learn minfulness skills and interpersonal effectiveness skills from DBT over the next 90 days.       Client will create and utilize 3 new coping skills a month.       Client's tx plan is new.   Ongoing treatment is still needed for this client due to goals not being fully realized or symptomology not being resolved.    Risk to  self:  Low  Client denied SI, plans, or intent in this session but has a hx of SI. Therapist will continue to assess.     Risk to others:  Low  Client denied HI, plans, or intent.     Progress Note Intervention       Progress Note Intervention:     Clinician met with the Client at the [facility]. ?     Clinician utilized MI to assess and assist Client in processing through feelings, identify possible pathways for forward movement, and gain insight.     Clinician provided support through active listening, reflection, and validation.     Clinician utilized? psychoeducation on tx planning goals, EMDR, and DBT. Along with effective communciation.     Follow-up:    Clinician plans to work on EMDR during the next session. ?     Client response:     Client processed feelings on trauma and interpersonal conflicts.     Client was open, receptive, and engaged during the session. Client seemed to make connections with today's discussion and exercises.    Patient acknowledged and verbally consented to continue with treatment with this Clinician and continue working on goals outlined in the current treatment plan.      Dictated using Dragon Speech Recognition.    Siria Kang LCSW  [facility] [id number]

## 2025-07-15 NOTE — PLAN OF CARE
Client and therapist discussed tx plan and client gave verbal consent to tx plan. She will update tx plan every 90 days.

## 2025-07-15 NOTE — TREATMENT PLAN
Multi-Disciplinary Problems (from Behavioral Health Treatment Plan)      Active Problems       Problem: Depression  Start Date: 07/15/25      Problem Details: Client presented for intake and reported a major depression being her biggest concern. Client reported depression symptoms as: depressed mood, negative thoughts about self and situation, loss of interest, sleep disturbances, and in the past SI without plans or intent. Client is encouraged to have weekly sessions. Client will be completed with therapy when she accomplishes all of her goals or has 3 or more no shows. Tx plan will be updated every 30 days.         Goal Priority Start Date Expected End Date End Date    Patient will demonstrate the ability to initiate new constructive life skills outside of sessions on a consistent basis. -- 07/15/25 01/13/26 --    Goal Details: Progress toward goal:  Not appropriate to rate progress toward goal since this is the initial treatment plan.  Client wants to process trauma and develop healthier communication styles.         Goal Intervention Frequency Start Date End Date    Assist patient in setting attainable activities of daily living goals. Weekly 07/15/25 --    Intervention Details: Client will start phase one of EMDR within one month.         Goal Intervention Frequency Start Date End Date    Provide education about depression Weekly 07/15/25 --    Intervention Details: Duration of treatment until discharged.  Client will learn minfulness skills and interpersonal effectiveness skills from DBT over the next 90 days.         Goal Intervention Frequency Start Date End Date    Assist patient in developing healthy coping strategies. Weekly 07/15/25 --    Intervention Details: Duration of treatment until discharged.  Client will create and utilize 3 new coping skills a month.                                I have discussed and reviewed this treatment plan with the patient.

## 2025-07-21 ENCOUNTER — TELEMEDICINE (OUTPATIENT)
Age: 40
End: 2025-07-21
Payer: COMMERCIAL

## 2025-07-21 DIAGNOSIS — F33.2 SEVERE EPISODE OF RECURRENT MAJOR DEPRESSIVE DISORDER, WITHOUT PSYCHOTIC FEATURES: Primary | ICD-10-CM

## 2025-07-21 PROCEDURE — 90837 PSYTX W PT 60 MINUTES: CPT

## 2025-07-21 NOTE — PROGRESS NOTES
Progress Note     Date: 07/21/2025   Client Name: Delphine King   MRN: [id number]   Start Time:    1201 end Time:    1257    Diagnoses and all orders for this visit:    1. Severe episode of recurrent major depressive disorder, without psychotic features (Primary)          Active Symptoms/Chief Complainant:   Client met for session via telehealth.  Client reported that she recently suffered a stroke and was having some speech related issues due to that.  Client discussed that she feels her stroke is related to an increase in stress.  Client discussed what she believes is her main stressor.  Client engaged in discussion related to boundaries and how to set them along with appropriate communication.  Client discussed how she can implement boundaries and appropriate communication with her wife.  Client reported some concerns about setting boundaries.  Client engaged in problem solving.  Client is encouraged to come back in 4 days.    MENTAL STATUS EXAM   General Appearance:  Cleanly groomed and dressed  Eye Contact:  Good eye contact  Attitude:  Cooperative  Motor Activity:  Normal gait, posture  Muscle Strength:  Normal  Speech:  Normal rate, tone, volume  Language:  Stereotypical  Mood and affect:  Normal, pleasant  Hopelessness:  Denies  Loneliness: Denies  Thought Process:  Logical  Associations/ Thought Content:  No delusions  Hallucinations:  None  Suicidal Ideations:  Not present  Homicidal Ideation:  Not present  Sensorium:  Alert and clear  Orientation:  Person, place, time and situation  Immediate Recall, Recent, and Remote Memory:  Intact  Attention Span/ Concentration:  Good  Fund of Knowledge:  Appropriate for age and educational level  Intellectual Functioning:  Above average  Insight:  Good  Judgement:  Good  Reliability:  Good  Impulse Control:  Good       Care Plan:  Tx plan:   Client wants to process trauma and develop healthier communication styles.     Client will start phase one of EMDR  within one month.   Client will learn minfulness skills and interpersonal effectiveness skills from DBT over the next 90 days.   Client will create and utilize 3 new coping skills a month.      Client reported making some progress on current care plan.  Ongoing treatment is still needed for this client due to goals not being fully realized or symptomology not being resolved.    Risk to self:  Low  Client denied SI, plans, or intent.    Risk to others:  Low  Client denied HI, plans, or intent.    Progress Note Intervention       Progress Note Intervention:     Clinician met with the Client via telehealth.  Client was located in her home in [facility].  Therapist was located in the office in [facility].    Clinician utilized MI to assess and assist Client in processing through feelings, identify possible pathways for forward movement, and gain insight.     Clinician provided support through active listening, reflection, and validation.     Clinician utilized? psychoeducation on boundaries and communication, and supportive feedback and active listening.    Follow-up:    Clinician plans to work on setting boundaries processing trauma during the next session. ?     Client response:     Client processed feelings on boundaries, recent stroke, and how to manage stressors.    Client was open, receptive, and engaged during the session. Client seemed to make connections with today's discussion and exercises.    Patient acknowledged and verbally consented to continue with treatment with this Clinician and continue working on goals outlined in the current treatment plan.      Dictated using Dragon Speech Recognition.    Siria Kang LCSW  [facility] 2109

## 2025-07-25 ENCOUNTER — TELEMEDICINE (OUTPATIENT)
Age: 40
End: 2025-07-25
Payer: COMMERCIAL

## 2025-07-25 DIAGNOSIS — F33.2 SEVERE EPISODE OF RECURRENT MAJOR DEPRESSIVE DISORDER, WITHOUT PSYCHOTIC FEATURES: Primary | ICD-10-CM

## 2025-07-25 NOTE — PROGRESS NOTES
Progress Note     Date: 07/25/2025   Client Name: Delphine King   MRN: [id number]   Start Time:    403 end Time:    458    Diagnoses and all orders for this visit:    1. Severe episode of recurrent major depressive disorder, without psychotic features (Primary)          Active Symptoms/Chief Complainant:   Client met for session via telehealth.  Client reported that she is doing better in regards to recovering from her stroke.  Client reported still having stressors in her home and at work.  Client reported some depression symptoms including: Depressed mood at times, negative thoughts about self, and loss of interest.  Client discussed recent stressors that she believes may have caused her stroke.  Client reports receiving medical treatment and continuing to follow doctor's care.  Client and therapist discussed ways to manage stress and set better boundaries.  Client is encouraged to come back in 1 week.    MENTAL STATUS EXAM   General Appearance:  Cleanly groomed and dressed  Eye Contact:  Good eye contact  Attitude:  Cooperative  Motor Activity:  Normal gait, posture  Muscle Strength:  Normal  Speech:  Normal rate, tone, volume  Language:  Stereotypical  Mood and affect:  Normal, pleasant  Hopelessness:  Denies  Loneliness: Denies  Thought Process:  Logical  Associations/ Thought Content:  No delusions  Hallucinations:  None  Suicidal Ideations:  Not present  Homicidal Ideation:  Not present  Sensorium:  Alert and clear  Orientation:  Person, place, time and situation  Immediate Recall, Recent, and Remote Memory:  Intact  Attention Span/ Concentration:  Good  Fund of Knowledge:  Appropriate for age and educational level  Intellectual Functioning:  Above average  Insight:  Good  Judgement:  Good  Reliability:  Good  Impulse Control:  Good       Care Plan:  Tx plan:   Client wants to process trauma and develop healthier communication styles.     Client will start phase one of EMDR within one month.   Client  will learn minfulness skills and interpersonal effectiveness skills from DBT over the next 90 days.   Client will create and utilize 3 new coping skills a month.      Client reported making some progress on current care plan.  Ongoing treatment is still needed for this client due to goals not being fully realized or symptomology not being resolved.    Risk to self:  Low  Client denies SI, plans, or intent.    Risk to others:  Low  Client denies HI, plans, or intent.    Progress Note Intervention       Progress Note Intervention:     Clinician met with the client via telehealth.  ?     Clinician utilized MI to assess and assist Client in processing through feelings, identify possible pathways for forward movement, and gain insight.     Clinician provided support through active listening, reflection, and validation.     Clinician utilized? psychoeducation on boundaries and coping skills    Follow-up:    Clinician plans to work on EMDR and coping skills during the next session. ?     Client response:     Client processed feelings on stressors and how to set boundaries.    Client was open, receptive, and engaged during the session. Client seemed to make connections with today's discussion and exercises.    Patient acknowledged and verbally consented to continue with treatment with this Clinician and continue working on goals outlined in the current treatment plan.      Dictated using Dragon Speech Recognition.    Siria Kang LCSW  [id number]

## 2025-08-08 ENCOUNTER — TELEMEDICINE (OUTPATIENT)
Age: 40
End: 2025-08-08
Payer: COMMERCIAL

## 2025-08-08 DIAGNOSIS — F33.2 SEVERE EPISODE OF RECURRENT MAJOR DEPRESSIVE DISORDER, WITHOUT PSYCHOTIC FEATURES: Primary | ICD-10-CM

## 2025-08-19 ENCOUNTER — OFFICE VISIT (OUTPATIENT)
Dept: OBSTETRICS AND GYNECOLOGY | Facility: CLINIC | Age: 40
End: 2025-08-19
Payer: COMMERCIAL

## 2025-08-19 VITALS
BODY MASS INDEX: 43.32 KG/M2 | WEIGHT: 276 LBS | SYSTOLIC BLOOD PRESSURE: 118 MMHG | DIASTOLIC BLOOD PRESSURE: 70 MMHG | HEIGHT: 67 IN

## 2025-08-19 DIAGNOSIS — Z01.419 ROUTINE GYNECOLOGICAL EXAMINATION: Primary | ICD-10-CM

## 2025-08-19 DIAGNOSIS — Z12.31 BREAST CANCER SCREENING BY MAMMOGRAM: ICD-10-CM

## 2025-08-19 DIAGNOSIS — Z01.419 SMEAR, VAGINAL, AS PART OF ROUTINE GYNECOLOGICAL EXAMINATION: ICD-10-CM

## 2025-08-19 DIAGNOSIS — Z86.711 HISTORY OF PULMONARY EMBOLISM: ICD-10-CM

## 2025-08-19 DIAGNOSIS — Z11.51 SPECIAL SCREENING EXAMINATION FOR HUMAN PAPILLOMAVIRUS (HPV): ICD-10-CM

## 2025-08-19 DIAGNOSIS — Z12.72 SMEAR, VAGINAL, AS PART OF ROUTINE GYNECOLOGICAL EXAMINATION: ICD-10-CM

## 2025-08-19 DIAGNOSIS — N82.3 RECTOVAGINAL FISTULA: ICD-10-CM

## 2025-08-21 ENCOUNTER — TELEMEDICINE (OUTPATIENT)
Age: 40
End: 2025-08-21
Payer: COMMERCIAL

## 2025-08-21 DIAGNOSIS — F33.2 SEVERE EPISODE OF RECURRENT MAJOR DEPRESSIVE DISORDER, WITHOUT PSYCHOTIC FEATURES: Primary | ICD-10-CM

## 2025-08-21 LAB
CYTOLOGIST CVX/VAG CYTO: NORMAL
CYTOLOGY CVX/VAG DOC CYTO: NORMAL
CYTOLOGY CVX/VAG DOC THIN PREP: NORMAL
DX ICD CODE: NORMAL
HPV I/H RISK 4 DNA CVX QL PROBE+SIG AMP: NEGATIVE
OTHER STN SPEC: NORMAL
SERVICE CMNT-IMP: NORMAL
STAT OF ADQ CVX/VAG CYTO-IMP: NORMAL

## (undated) DEVICE — Device

## (undated) DEVICE — TB ET HI LO CUFF MURPHY 7.5MM

## (undated) DEVICE — GLV SURG SENSICARE W/ALOE PF LF 6.5 STRL

## (undated) DEVICE — DRSNG SURESITE WNDW 2.38X2.75

## (undated) DEVICE — DRSNG TELFA PAD NONADH STR 1S 3X8IN

## (undated) DEVICE — SUT VIC 0/0 UR6 27IN DYED J603H

## (undated) DEVICE — SOL IRR H2O BTL 1000ML STRL

## (undated) DEVICE — TB ET HI LO CUFF MURPHY 7MM

## (undated) DEVICE — METZENBAUM SCISSOR TIP, DISPOSABLE: Brand: RENEW

## (undated) DEVICE — APPL HEMO ENDO SURGICEL 2IN1 1P/U STRL

## (undated) DEVICE — SPHINCTEROTOME: Brand: HYDRATOME RX 44

## (undated) DEVICE — ADAPT CLN BIOGUARD AIR/H2O DISP

## (undated) DEVICE — DECANT BG O JET

## (undated) DEVICE — SYR LUERLOK 20CC BX/50

## (undated) DEVICE — SENSR O2 OXIMAX FNGR A/ 18IN NONSTR

## (undated) DEVICE — RESERVOIR,SUCTION,100CC,SILICONE: Brand: MEDLINE

## (undated) DEVICE — SYR LUERLOK 30CC

## (undated) DEVICE — MSK PROC CURAPLEX O2 2/ADAPT 7FT

## (undated) DEVICE — LAPAROSCOPIC SCOPE WARMER: Brand: DEROYAL

## (undated) DEVICE — APPL HEMOS FOR DELIVERY FLOSEAL

## (undated) DEVICE — ENDOPATH XCEL BLADELESS TROCARS WITH STABILITY SLEEVES: Brand: ENDOPATH XCEL

## (undated) DEVICE — 2, DISPOSABLE SUCTION/IRRIGATOR WITH DISPOSABLE TIP: Brand: STRYKEFLOW

## (undated) DEVICE — LN SMPL CO2 SHTRM SD STREAM W/M LUER

## (undated) DEVICE — DECANTER: Brand: UNBRANDED

## (undated) DEVICE — ERBE NESSY®PLATE 170 SPLIT; 168CM²; CABLE 3M: Brand: ERBE

## (undated) DEVICE — DRSNG SURESITE WNDW 4X4.5

## (undated) DEVICE — DRP C/ARM 41X74IN

## (undated) DEVICE — ENDOPATH XCEL UNIVERSAL TROCAR STABLILITY SLEEVES: Brand: ENDOPATH XCEL

## (undated) DEVICE — FRCP BX RADJAW4 NDL 2.8 240CM LG OG BX40

## (undated) DEVICE — PK LAP GEN 90

## (undated) DEVICE — SINGLE USE DISTAL COVER MAJ-2315: Brand: SINGLE USE DISTAL COVER

## (undated) DEVICE — CONTAINER,SPECIMEN,OR STERILE,4OZ: Brand: MEDLINE

## (undated) DEVICE — TUBING, SUCTION, 1/4" X 10', STRAIGHT: Brand: MEDLINE

## (undated) DEVICE — TBG INSUFL W FLTR STRL

## (undated) DEVICE — LAPAROSCOPIC SMOKE FILTRATION SYSTEM: Brand: PALL LAPAROSHIELD® PLUS LAPAROSCOPIC SMOKE FILTRATION SYSTEM

## (undated) DEVICE — UNDYED BRAIDED (POLYGLACTIN 910), SYNTHETIC ABSORBABLE SUTURE: Brand: COATED VICRYL

## (undated) DEVICE — BITEBLOCK OMNI BLOC

## (undated) DEVICE — DEV LK WIREGUIDE FUSN OLYMP SCP

## (undated) DEVICE — KT ORCA ORCAPOD DISP STRL

## (undated) DEVICE — DRN WND HUBLSS FLUT FULL PERF SIL10MM

## (undated) DEVICE — RETRIEVAL BALLOON CATHETER: Brand: EXTRACTOR™ PRO RX

## (undated) DEVICE — SET CATH CHOLANG REDK W/SCOOP TIP INTRO 4F 50CM

## (undated) DEVICE — ADHS SKIN PREMIERPRO EXOFIN TOPICAL HI/VISC .5ML

## (undated) DEVICE — TROCARS: Brand: KII® BALLOON BLUNT TIP SYSTEM

## (undated) DEVICE — ENDOPOUCH RETRIEVER SPECIMEN RETRIEVAL BAGS: Brand: ENDOPOUCH RETRIEVER

## (undated) DEVICE — SUT ETHLN 3/0 PSL BLK MONO SA 30IN 1691H